# Patient Record
Sex: FEMALE | Race: ASIAN | NOT HISPANIC OR LATINO | Employment: OTHER | URBAN - METROPOLITAN AREA
[De-identification: names, ages, dates, MRNs, and addresses within clinical notes are randomized per-mention and may not be internally consistent; named-entity substitution may affect disease eponyms.]

---

## 2018-01-05 ENCOUNTER — OFFICE VISIT (OUTPATIENT)
Dept: URGENT CARE | Facility: PHYSICIAN GROUP | Age: 63
End: 2018-01-05
Payer: COMMERCIAL

## 2018-01-05 VITALS
TEMPERATURE: 98.1 F | OXYGEN SATURATION: 95 % | DIASTOLIC BLOOD PRESSURE: 62 MMHG | SYSTOLIC BLOOD PRESSURE: 104 MMHG | BODY MASS INDEX: 34.91 KG/M2 | WEIGHT: 197 LBS | HEART RATE: 94 BPM | HEIGHT: 63 IN

## 2018-01-05 DIAGNOSIS — J22 LRTI (LOWER RESPIRATORY TRACT INFECTION): ICD-10-CM

## 2018-01-05 PROCEDURE — 99203 OFFICE O/P NEW LOW 30 MIN: CPT | Performed by: FAMILY MEDICINE

## 2018-01-05 RX ORDER — AZITHROMYCIN 250 MG/1
TABLET, FILM COATED ORAL
Qty: 6 TAB | Refills: 0 | Status: ON HOLD | OUTPATIENT
Start: 2018-01-05 | End: 2019-07-05

## 2018-01-05 RX ORDER — BENZONATATE 100 MG/1
100 CAPSULE ORAL 3 TIMES DAILY PRN
Qty: 15 CAP | Refills: 0 | Status: ON HOLD | OUTPATIENT
Start: 2018-01-05 | End: 2019-07-05

## 2018-01-05 ASSESSMENT — PAIN SCALES - GENERAL: PAINLEVEL: NO PAIN

## 2018-01-06 NOTE — PATIENT INSTRUCTIONS
Cough, Adult   A cough is a reflex that helps clear your throat and airways. It can help heal the body or may be a reaction to an irritated airway. A cough may only last 2 or 3 weeks (acute) or may last more than 8 weeks (chronic).   CAUSES  Acute cough:  · Viral or bacterial infections.  Chronic cough:  · Infections.  · Allergies.  · Asthma.  · Post-nasal drip.  · Smoking.  · Heartburn or acid reflux.  · Some medicines.  · Chronic lung problems (COPD).  · Cancer.  SYMPTOMS   · Cough.  · Fever.  · Chest pain.  · Increased breathing rate.  · High-pitched whistling sound when breathing (wheezing).  · Colored mucus that you cough up (sputum).  TREATMENT   · A bacterial cough may be treated with antibiotic medicine.  · A viral cough must run its course and will not respond to antibiotics.  · Your caregiver may recommend other treatments if you have a chronic cough.  HOME CARE INSTRUCTIONS   · Only take over-the-counter or prescription medicines for pain, discomfort, or fever as directed by your caregiver. Use cough suppressants only as directed by your caregiver.  · Use a cold steam vaporizer or humidifier in your bedroom or home to help loosen secretions.  · Sleep in a semi-upright position if your cough is worse at night.  · Rest as needed.  · Stop smoking if you smoke.  SEEK IMMEDIATE MEDICAL CARE IF:   · You have pus in your sputum.  · Your cough starts to worsen.  · You cannot control your cough with suppressants and are losing sleep.  · You begin coughing up blood.  · You have difficulty breathing.  · You develop pain which is getting worse or is uncontrolled with medicine.  · You have a fever.  MAKE SURE YOU:   · Understand these instructions.  · Will watch your condition.  · Will get help right away if you are not doing well or get worse.     This information is not intended to replace advice given to you by your health care provider. Make sure you discuss any questions you have with your health care provider.      Document Released: 06/15/2012 Document Revised: 03/11/2013 Document Reviewed: 02/24/2016  [employer] Interactive Patient Education ©2016 [employer]

## 2018-01-08 ASSESSMENT — ENCOUNTER SYMPTOMS
CHILLS: 0
SHORTNESS OF BREATH: 1
FEVER: 0
COUGH: 1
RHINORRHEA: 0
WHEEZING: 0

## 2018-01-08 NOTE — PROGRESS NOTES
"Subjective:   Zena Hui is a 62 y.o. female who presents for Cough (ChronicCough , chest congestion and pain x5 days)        Cough   This is a new problem. The current episode started in the past 7 days. The problem has been gradually worsening. The problem occurs every few minutes. The cough is non-productive. Associated symptoms include shortness of breath. Pertinent negatives include no chills, fever, nasal congestion, postnasal drip, rhinorrhea or wheezing.     Review of Systems   Constitutional: Negative for chills and fever.   HENT: Negative for postnasal drip and rhinorrhea.    Respiratory: Positive for cough and shortness of breath. Negative for wheezing.      Allergies   Allergen Reactions   • Calcium      Constipation      Objective:   /62   Pulse 94   Temp 36.7 °C (98.1 °F)   Ht 1.6 m (5' 3\")   Wt 89.4 kg (197 lb)   LMP 01/01/2009   SpO2 95%   Breastfeeding? No   BMI 34.90 kg/m²   Physical Exam   Constitutional: She is oriented to person, place, and time. She appears well-developed and well-nourished. No distress.   HENT:   Head: Normocephalic and atraumatic.   Eyes: Conjunctivae and EOM are normal. Pupils are equal, round, and reactive to light.   Cardiovascular: Normal rate and regular rhythm.    No murmur heard.  Pulmonary/Chest: Effort normal and breath sounds normal. No respiratory distress. She has no wheezes. She has no rales.   Abdominal: Soft. She exhibits no distension. There is no tenderness.   Neurological: She is alert and oriented to person, place, and time. She has normal reflexes. No sensory deficit.   Skin: Skin is warm and dry.   Psychiatric: She has a normal mood and affect.         Assessment/Plan:   Assessment    1. LRTI (lower respiratory tract infection)  Differential diagnosis, natural history, supportive care, and indications for immediate follow-up discussed.   - azithromycin (ZITHROMAX) 250 MG Tab; Take 2 tablets by mouth on day one. Take one tablet by " mouth the remaining days until gone  Dispense: 6 Tab; Refill: 0  - benzonatate (TESSALON) 100 MG Cap; Take 1 Cap by mouth 3 times a day as needed for Cough.  Dispense: 15 Cap; Refill: 0

## 2018-06-27 ENCOUNTER — APPOINTMENT (OUTPATIENT)
Dept: RADIOLOGY | Facility: IMAGING CENTER | Age: 63
End: 2018-06-27
Attending: NURSE PRACTITIONER
Payer: COMMERCIAL

## 2018-06-27 ENCOUNTER — OFFICE VISIT (OUTPATIENT)
Dept: URGENT CARE | Facility: CLINIC | Age: 63
End: 2018-06-27
Payer: COMMERCIAL

## 2018-06-27 VITALS
WEIGHT: 197 LBS | DIASTOLIC BLOOD PRESSURE: 78 MMHG | SYSTOLIC BLOOD PRESSURE: 140 MMHG | TEMPERATURE: 98.4 F | RESPIRATION RATE: 16 BRPM | BODY MASS INDEX: 36.25 KG/M2 | HEART RATE: 93 BPM | OXYGEN SATURATION: 94 % | HEIGHT: 62 IN

## 2018-06-27 DIAGNOSIS — J40 BRONCHITIS: ICD-10-CM

## 2018-06-27 DIAGNOSIS — J02.9 SORE THROAT: ICD-10-CM

## 2018-06-27 DIAGNOSIS — R05.9 COUGH: ICD-10-CM

## 2018-06-27 LAB
INT CON NEG: NEGATIVE
INT CON POS: POSITIVE
S PYO AG THROAT QL: NEGATIVE

## 2018-06-27 PROCEDURE — 71046 X-RAY EXAM CHEST 2 VIEWS: CPT | Mod: TC,FY | Performed by: NURSE PRACTITIONER

## 2018-06-27 PROCEDURE — 99214 OFFICE O/P EST MOD 30 MIN: CPT | Performed by: NURSE PRACTITIONER

## 2018-06-27 PROCEDURE — 87880 STREP A ASSAY W/OPTIC: CPT | Performed by: NURSE PRACTITIONER

## 2018-06-27 RX ORDER — ALBUTEROL SULFATE 90 UG/1
2 AEROSOL, METERED RESPIRATORY (INHALATION) EVERY 6 HOURS PRN
Qty: 8.5 G | Refills: 0 | Status: ON HOLD | OUTPATIENT
Start: 2018-06-27 | End: 2019-07-05

## 2018-06-27 RX ORDER — PREDNISONE 10 MG/1
TABLET ORAL
Qty: 115 TAB | Refills: 0 | Status: ON HOLD | OUTPATIENT
Start: 2018-06-27 | End: 2019-07-05

## 2018-06-27 RX ORDER — DOXYCYCLINE HYCLATE 100 MG
100 TABLET ORAL 2 TIMES DAILY
Qty: 20 TAB | Refills: 0 | Status: CANCELLED | OUTPATIENT
Start: 2018-06-27 | End: 2018-07-07

## 2018-06-27 RX ORDER — BENZONATATE 200 MG/1
200 CAPSULE ORAL 3 TIMES DAILY PRN
Qty: 60 CAP | Refills: 0 | Status: ON HOLD | OUTPATIENT
Start: 2018-06-27 | End: 2019-07-05

## 2018-06-27 RX ORDER — AZITHROMYCIN 250 MG/1
TABLET, FILM COATED ORAL
Qty: 6 TAB | Refills: 0 | Status: ON HOLD | OUTPATIENT
Start: 2018-06-27 | End: 2019-07-05

## 2018-06-27 ASSESSMENT — ENCOUNTER SYMPTOMS
CHILLS: 0
SORE THROAT: 1
FEVER: 0

## 2018-06-27 NOTE — PROGRESS NOTES
Subjective:      Zena Hui is a 63 y.o. female who presents with Sore Throat (4wks throat burns, feels like something is blocking swallowing, fatigue)    Past Medical History:   Diagnosis Date   • Essential hypertension, benign    • Hyperlipidemia    • Mixed hyperlipidemia    • Organic sleep apnea, unspecified    • Osteopenia    • Type II or unspecified type diabetes mellitus without mention of complication, not stated as uncontrolled      Social History     Social History   • Marital status:      Spouse name: N/A   • Number of children: N/A   • Years of education: N/A     Occupational History   • Not on file.     Social History Main Topics   • Smoking status: Never Smoker   • Smokeless tobacco: Never Used   • Alcohol use Not on file   • Drug use: No   • Sexual activity: Yes     Partners: Male     Other Topics Concern   • Not on file     Social History Narrative   • No narrative on file     Family History   Problem Relation Age of Onset   • Arthritis Mother    • Lung Disease Mother      long term smoker   • Heart Disease Father    • Heart Disease Brother      CAD   S/P CABG       Allergies: Calcium    Patient is a 63-year-old female who presents with complaint of ongoing productive cough. Original symptoms started 4 weeks ago. States that she is also having fatigue and malaise with an increased sore throat. States her sore throat waxes and wanes and is usually more tender at night. Previously took a z-pack and states this helped but states her sypmtoms have been persistent.           Other   This is a new problem. The current episode started 1 to 4 weeks ago. The problem occurs constantly. The problem has been unchanged. Associated symptoms include a sore throat. Pertinent negatives include no chills or fever. Nothing aggravates the symptoms. She has tried nothing for the symptoms. The treatment provided no relief.       Review of Systems   Constitutional: Positive for malaise/fatigue. Negative for  "chills and fever.   HENT: Positive for sore throat.    Skin: Negative.    All other systems reviewed and are negative.         Objective:     /78   Pulse 93   Temp 36.9 °C (98.4 °F)   Resp 16   Ht 1.575 m (5' 2\")   Wt 89.4 kg (197 lb)   LMP 01/01/2009   SpO2 94%   Breastfeeding? No   BMI 36.03 kg/m²      Physical Exam   Constitutional: She is oriented to person, place, and time. She appears well-developed and well-nourished.   HENT:   Head: Normocephalic.   Right Ear: External ear normal.   Left Ear: External ear normal.   Nose: Nose normal.   Mouth/Throat: Oropharynx is clear and moist. No oropharyngeal exudate.   Eyes: Conjunctivae and EOM are normal. Pupils are equal, round, and reactive to light.   Neck: Normal range of motion. Neck supple.   Cardiovascular: Normal rate and regular rhythm.    Pulmonary/Chest: Effort normal and breath sounds normal.   Musculoskeletal: Normal range of motion.   Neurological: She is alert and oriented to person, place, and time.   Skin: Skin is warm and dry. Capillary refill takes less than 2 seconds.   Psychiatric: She has a normal mood and affect. Her behavior is normal. Judgment and thought content normal.   Vitals reviewed.    poct strep: negative     XR chest: negative           Assessment/Plan:     1. Sore throat  2. Bronchitis  3. Cough  -prednisone  -zithromax  -albuterol  -tessalon PRN cough  -ER precautions for respiratory distress       "

## 2018-08-17 ENCOUNTER — OFFICE VISIT (OUTPATIENT)
Dept: URGENT CARE | Facility: PHYSICIAN GROUP | Age: 63
End: 2018-08-17
Payer: COMMERCIAL

## 2018-08-17 ENCOUNTER — HOSPITAL ENCOUNTER (OUTPATIENT)
Dept: RADIOLOGY | Facility: MEDICAL CENTER | Age: 63
End: 2018-08-17
Attending: FAMILY MEDICINE
Payer: COMMERCIAL

## 2018-08-17 VITALS
SYSTOLIC BLOOD PRESSURE: 140 MMHG | TEMPERATURE: 97.1 F | BODY MASS INDEX: 35.44 KG/M2 | HEART RATE: 80 BPM | RESPIRATION RATE: 14 BRPM | OXYGEN SATURATION: 95 % | WEIGHT: 200 LBS | HEIGHT: 63 IN | DIASTOLIC BLOOD PRESSURE: 80 MMHG
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
## 2018-08-17 PROCEDURE — 99214 OFFICE O/P EST MOD 30 MIN: CPT | Performed by: FAMILY MEDICINE

## 2018-08-17 PROCEDURE — 73080 X-RAY EXAM OF ELBOW: CPT | Mod: RT

## 2018-08-17 PROCEDURE — 73110 X-RAY EXAM OF WRIST: CPT | Mod: RT

## 2018-08-17 PROCEDURE — 73562 X-RAY EXAM OF KNEE 3: CPT | Mod: LT

## 2018-08-18 NOTE — PROGRESS NOTES
"  Chief Complaint   Patient presents with   • Arm Injury     arm and elbow pain from a fall        HPI    C/o dull, achy intermittent rt elbow pain x 1 d  After a ground level fall.    Also sustained some abrasions to rt knee and      Denies f/c.   Pain better with motrin      Past Medical History:   Diagnosis Date   • Essential hypertension, benign    • Hyperlipidemia    • Mixed hyperlipidemia    • Organic sleep apnea, unspecified    • Osteopenia    • Type II or unspecified type diabetes mellitus without mention of complication, not stated as uncontrolled        Social History   Substance Use Topics   • Smoking status: Never Smoker   • Smokeless tobacco: Never Used   • Alcohol use Not on file       Family History   Problem Relation Age of Onset   • Arthritis Mother    • Lung Disease Mother         long term smoker   • Heart Disease Father    • Heart Disease Brother         CAD   S/P CABG           Review of Systems   Constitutional: Negative for fever, chills and weight loss.   HENT - denies cough, ear pain, congestion, sore throat  Eyes: denies vision changes, discharge  Respiratory: Negative for cough and wheezing.    Cardiovascular: Negative for chest pain or PND.   Gastrointestinal:  No abdominal pain,  nausea, vomiting, diarrhea.  Negative for  blood in stool.    - no discharge, dysuria, frequency.      Neurological: Negative for dizziness and headaches.   musculoskeletal - denies myalgias, calf pain  Psych - denies anxiety/depression/mood changes.  Skin: no itching or rash  All other systems reviewed and are negative.       Objective:     Blood pressure 140/80, pulse 80, temperature 36.2 °C (97.1 °F), resp. rate 14, height 1.6 m (5' 3\"), weight 90.7 kg (200 lb), last menstrual period 01/01/2009, SpO2 95 %.      Physical Exam   Constitutional: pt is oriented to person, place, and time. Pt appears well-developed. No distress.   HENT:   Head: Normocephalic and atraumatic.   Eyes: Conjunctivae are normal. "   Cardiovascular: Normal rate.    Pulmonary/Chest: Effort normal.   Musculoskeletal:        Rt elbow: full AROM.  There is no joint effusion.   Pt exhibits normal range of motion.     +TTP over olecranon process  Neurological: pt is alert and oriented to person, place, and time.   Skin: Skin is warm. Pt is not diaphoretic. No erythema.   Psychiatric: pt's behavior is normal.   Nursing note and vitals reviewed.       Narrative       8/17/2018 6:03 PM    HISTORY/REASON FOR EXAM:  Pain/Deformity Following Trauma.  Fall, pain.    TECHNIQUE/EXAM DESCRIPTION AND NUMBER OF VIEWS:  3 views of the RIGHT elbow.    COMPARISON: None    FINDINGS:  All 3 images are oblique in orientation, limiting their efficacy for detection of fracture and effusion    No gross effusion is seen    No acute fracture or subluxation is seen.    There is moderate bony spurring especially along the common extensor origin   Impression       No radiographic evidence of acute traumatic injury.    Limited by technique. If symptoms persist, follow-up radiographs are advised when the patient is better able to tolerate positioning   Reading Provider Reading Date   Espinoza Borges M.D. Aug 17, 2018            8/17/2018 6:03 PM    HISTORY/REASON FOR EXAM:  Pain/Deformity Following Trauma.  Fall, pain.    TECHNIQUE/EXAM DESCRIPTION AND NUMBER OF VIEWS:  3 views of the RIGHT elbow.    COMPARISON: None    FINDINGS:  All 3 images are oblique in orientation, limiting their efficacy for detection of fracture and effusion    No gross effusion is seen    No acute fracture or subluxation is seen.    There is moderate bony spurring especially along the common extensor origin   Impression       No radiographic evidence of acute traumatic injury.    Limited by technique. If symptoms persist, follow-up radiographs are advised when the patient is better able to tolerate positioning   Reading Provider Reading Date   Espinoza Borges M.D. Aug 17, 2018   Signing Provider  Signing Date Signing Time   Espinoza Borges M.D. Aug 17, 2018  6:43 PM       Assessment/Plan:         Elbow contusion    X-rays were personally reviewed by myself.   There is no fracture.     rx motrin 800mg tid, prn  Arm placed in sling  Follow up in one week if no improvement, sooner if symptoms worsen.       - DX-KNEE 3 VIEWS LEFT; Future  - DX-ELBOW-COMPLETE 3+ RIGHT; Future  - DX-WRIST-COMPLETE 3+ RIGHT; Future

## 2019-07-05 ENCOUNTER — ANESTHESIA EVENT (OUTPATIENT)
Dept: SURGERY | Facility: MEDICAL CENTER | Age: 64
End: 2019-07-05
Payer: COMMERCIAL

## 2019-07-05 ENCOUNTER — ANESTHESIA (OUTPATIENT)
Dept: SURGERY | Facility: MEDICAL CENTER | Age: 64
End: 2019-07-05
Payer: COMMERCIAL

## 2019-07-05 ENCOUNTER — HOSPITAL ENCOUNTER (OUTPATIENT)
Facility: MEDICAL CENTER | Age: 64
End: 2019-07-05
Attending: UROLOGY | Admitting: UROLOGY
Payer: COMMERCIAL

## 2019-07-05 ENCOUNTER — APPOINTMENT (OUTPATIENT)
Dept: RADIOLOGY | Facility: MEDICAL CENTER | Age: 64
End: 2019-07-05
Attending: UROLOGY
Payer: COMMERCIAL

## 2019-07-05 VITALS
BODY MASS INDEX: 36.68 KG/M2 | HEIGHT: 63 IN | TEMPERATURE: 98.6 F | HEART RATE: 66 BPM | OXYGEN SATURATION: 94 % | RESPIRATION RATE: 16 BRPM | WEIGHT: 207.01 LBS

## 2019-07-05 PROBLEM — E66.9 OBESITY (BMI 30-39.9): Status: ACTIVE | Noted: 2019-07-05

## 2019-07-05 LAB
ANION GAP SERPL CALC-SCNC: 9 MMOL/L (ref 0–11.9)
APPEARANCE UR: CLEAR
BACTERIA #/AREA URNS HPF: NEGATIVE /HPF
BILIRUB UR QL STRIP.AUTO: NEGATIVE
BUN SERPL-MCNC: 15 MG/DL (ref 8–22)
CALCIUM SERPL-MCNC: 9.3 MG/DL (ref 8.5–10.5)
CHLORIDE SERPL-SCNC: 108 MMOL/L (ref 96–112)
CO2 SERPL-SCNC: 22 MMOL/L (ref 20–33)
COLOR UR: YELLOW
CREAT SERPL-MCNC: 0.9 MG/DL (ref 0.5–1.4)
EKG IMPRESSION: NORMAL
EPI CELLS #/AREA URNS HPF: NEGATIVE /HPF
ERYTHROCYTE [DISTWIDTH] IN BLOOD BY AUTOMATED COUNT: 43.3 FL (ref 35.9–50)
GLUCOSE BLD-MCNC: 146 MG/DL (ref 65–99)
GLUCOSE BLD-MCNC: 152 MG/DL (ref 65–99)
GLUCOSE SERPL-MCNC: 156 MG/DL (ref 65–99)
GLUCOSE UR STRIP.AUTO-MCNC: NEGATIVE MG/DL
HCT VFR BLD AUTO: 42.3 % (ref 37–47)
HGB BLD-MCNC: 13.3 G/DL (ref 12–16)
HYALINE CASTS #/AREA URNS LPF: ABNORMAL /LPF
KETONES UR STRIP.AUTO-MCNC: NEGATIVE MG/DL
LEUKOCYTE ESTERASE UR QL STRIP.AUTO: ABNORMAL
MCH RBC QN AUTO: 27.6 PG (ref 27–33)
MCHC RBC AUTO-ENTMCNC: 31.4 G/DL (ref 33.6–35)
MCV RBC AUTO: 87.8 FL (ref 81.4–97.8)
MICRO URNS: ABNORMAL
NITRITE UR QL STRIP.AUTO: NEGATIVE
PH UR STRIP.AUTO: 5 [PH]
PLATELET # BLD AUTO: 270 K/UL (ref 164–446)
PMV BLD AUTO: 9.2 FL (ref 9–12.9)
POTASSIUM SERPL-SCNC: 3.9 MMOL/L (ref 3.6–5.5)
PROT UR QL STRIP: NEGATIVE MG/DL
RBC # BLD AUTO: 4.82 M/UL (ref 4.2–5.4)
RBC # URNS HPF: ABNORMAL /HPF
RBC UR QL AUTO: ABNORMAL
SODIUM SERPL-SCNC: 139 MMOL/L (ref 135–145)
SP GR UR STRIP.AUTO: 1.01
UROBILINOGEN UR STRIP.AUTO-MCNC: 0.2 MG/DL
WBC # BLD AUTO: 7.3 K/UL (ref 4.8–10.8)
WBC #/AREA URNS HPF: ABNORMAL /HPF

## 2019-07-05 PROCEDURE — 160041 HCHG SURGERY MINUTES - EA ADDL 1 MIN LEVEL 4: Performed by: UROLOGY

## 2019-07-05 PROCEDURE — 160048 HCHG OR STATISTICAL LEVEL 1-5: Performed by: UROLOGY

## 2019-07-05 PROCEDURE — 160009 HCHG ANES TIME/MIN: Performed by: UROLOGY

## 2019-07-05 PROCEDURE — 160002 HCHG RECOVERY MINUTES (STAT): Performed by: UROLOGY

## 2019-07-05 PROCEDURE — 700117 HCHG RX CONTRAST REV CODE 255: Performed by: UROLOGY

## 2019-07-05 PROCEDURE — A9270 NON-COVERED ITEM OR SERVICE: HCPCS | Performed by: ANESTHESIOLOGY

## 2019-07-05 PROCEDURE — 501329 HCHG SET, CYSTO IRRIG Y TUR: Performed by: UROLOGY

## 2019-07-05 PROCEDURE — 500880 HCHG PACK, CYSTO W/SEP LEGGINGS: Performed by: UROLOGY

## 2019-07-05 PROCEDURE — 88300 SURGICAL PATH GROSS: CPT

## 2019-07-05 PROCEDURE — 93005 ELECTROCARDIOGRAM TRACING: CPT | Performed by: UROLOGY

## 2019-07-05 PROCEDURE — 700102 HCHG RX REV CODE 250 W/ 637 OVERRIDE(OP): Performed by: ANESTHESIOLOGY

## 2019-07-05 PROCEDURE — 82962 GLUCOSE BLOOD TEST: CPT

## 2019-07-05 PROCEDURE — C1758 CATHETER, URETERAL: HCPCS | Performed by: UROLOGY

## 2019-07-05 PROCEDURE — C1769 GUIDE WIRE: HCPCS | Performed by: UROLOGY

## 2019-07-05 PROCEDURE — 82365 CALCULUS SPECTROSCOPY: CPT

## 2019-07-05 PROCEDURE — C2617 STENT, NON-COR, TEM W/O DEL: HCPCS | Performed by: UROLOGY

## 2019-07-05 PROCEDURE — 700105 HCHG RX REV CODE 258: Performed by: UROLOGY

## 2019-07-05 PROCEDURE — 87086 URINE CULTURE/COLONY COUNT: CPT

## 2019-07-05 PROCEDURE — 700105 HCHG RX REV CODE 258: Performed by: ANESTHESIOLOGY

## 2019-07-05 PROCEDURE — 160046 HCHG PACU - 1ST 60 MINS PHASE II: Performed by: UROLOGY

## 2019-07-05 PROCEDURE — 85027 COMPLETE CBC AUTOMATED: CPT

## 2019-07-05 PROCEDURE — 700111 HCHG RX REV CODE 636 W/ 250 OVERRIDE (IP)

## 2019-07-05 PROCEDURE — 160029 HCHG SURGERY MINUTES - 1ST 30 MINS LEVEL 4: Performed by: UROLOGY

## 2019-07-05 PROCEDURE — 81001 URINALYSIS AUTO W/SCOPE: CPT

## 2019-07-05 PROCEDURE — 80048 BASIC METABOLIC PNL TOTAL CA: CPT

## 2019-07-05 PROCEDURE — 160025 RECOVERY II MINUTES (STATS): Performed by: UROLOGY

## 2019-07-05 PROCEDURE — 93010 ELECTROCARDIOGRAM REPORT: CPT | Performed by: INTERNAL MEDICINE

## 2019-07-05 PROCEDURE — 700111 HCHG RX REV CODE 636 W/ 250 OVERRIDE (IP): Performed by: ANESTHESIOLOGY

## 2019-07-05 PROCEDURE — 160035 HCHG PACU - 1ST 60 MINS PHASE I: Performed by: UROLOGY

## 2019-07-05 PROCEDURE — 700101 HCHG RX REV CODE 250: Performed by: ANESTHESIOLOGY

## 2019-07-05 DEVICE — STENT UROLOGICAL POLARIS 6X22  ULTRA: Type: IMPLANTABLE DEVICE | Status: FUNCTIONAL

## 2019-07-05 RX ORDER — LIDOCAINE HYDROCHLORIDE 10 MG/ML
INJECTION, SOLUTION EPIDURAL; INFILTRATION; INTRACAUDAL; PERINEURAL
Status: COMPLETED
Start: 2019-07-05 | End: 2019-07-05

## 2019-07-05 RX ORDER — OXYCODONE HCL 5 MG/5 ML
5 SOLUTION, ORAL ORAL
Status: DISCONTINUED | OUTPATIENT
Start: 2019-07-05 | End: 2019-07-05 | Stop reason: HOSPADM

## 2019-07-05 RX ORDER — GABAPENTIN 300 MG/1
300 CAPSULE ORAL ONCE
Status: COMPLETED | OUTPATIENT
Start: 2019-07-05 | End: 2019-07-05

## 2019-07-05 RX ORDER — MEPERIDINE HYDROCHLORIDE 25 MG/ML
12.5 INJECTION INTRAMUSCULAR; INTRAVENOUS; SUBCUTANEOUS
Status: DISCONTINUED | OUTPATIENT
Start: 2019-07-05 | End: 2019-07-05 | Stop reason: HOSPADM

## 2019-07-05 RX ORDER — CIPROFLOXACIN 2 MG/ML
INJECTION, SOLUTION INTRAVENOUS PRN
Status: DISCONTINUED | OUTPATIENT
Start: 2019-07-05 | End: 2019-07-05 | Stop reason: SURG

## 2019-07-05 RX ORDER — HALOPERIDOL 5 MG/ML
1 INJECTION INTRAMUSCULAR
Status: DISCONTINUED | OUTPATIENT
Start: 2019-07-05 | End: 2019-07-05 | Stop reason: HOSPADM

## 2019-07-05 RX ORDER — DEXAMETHASONE SODIUM PHOSPHATE 4 MG/ML
INJECTION, SOLUTION INTRA-ARTICULAR; INTRALESIONAL; INTRAMUSCULAR; INTRAVENOUS; SOFT TISSUE PRN
Status: DISCONTINUED | OUTPATIENT
Start: 2019-07-05 | End: 2019-07-05 | Stop reason: SURG

## 2019-07-05 RX ORDER — SODIUM CHLORIDE, SODIUM GLUCONATE, SODIUM ACETATE, POTASSIUM CHLORIDE AND MAGNESIUM CHLORIDE 526; 502; 368; 37; 30 MG/100ML; MG/100ML; MG/100ML; MG/100ML; MG/100ML
500 INJECTION, SOLUTION INTRAVENOUS CONTINUOUS
Status: DISCONTINUED | OUTPATIENT
Start: 2019-07-05 | End: 2019-07-05 | Stop reason: HOSPADM

## 2019-07-05 RX ORDER — ACETAMINOPHEN 500 MG
1000 TABLET ORAL ONCE
Status: COMPLETED | OUTPATIENT
Start: 2019-07-05 | End: 2019-07-05

## 2019-07-05 RX ORDER — ONDANSETRON 2 MG/ML
INJECTION INTRAMUSCULAR; INTRAVENOUS PRN
Status: DISCONTINUED | OUTPATIENT
Start: 2019-07-05 | End: 2019-07-05 | Stop reason: SURG

## 2019-07-05 RX ORDER — CELECOXIB 200 MG/1
200 CAPSULE ORAL ONCE
Status: COMPLETED | OUTPATIENT
Start: 2019-07-05 | End: 2019-07-05

## 2019-07-05 RX ORDER — SODIUM CHLORIDE, SODIUM LACTATE, POTASSIUM CHLORIDE, CALCIUM CHLORIDE 600; 310; 30; 20 MG/100ML; MG/100ML; MG/100ML; MG/100ML
INJECTION, SOLUTION INTRAVENOUS CONTINUOUS
Status: DISCONTINUED | OUTPATIENT
Start: 2019-07-05 | End: 2019-07-05

## 2019-07-05 RX ORDER — SODIUM CHLORIDE, SODIUM LACTATE, POTASSIUM CHLORIDE, CALCIUM CHLORIDE 600; 310; 30; 20 MG/100ML; MG/100ML; MG/100ML; MG/100ML
INJECTION, SOLUTION INTRAVENOUS
Status: DISCONTINUED | OUTPATIENT
Start: 2019-07-05 | End: 2019-07-05 | Stop reason: SURG

## 2019-07-05 RX ORDER — OXYCODONE HCL 5 MG/5 ML
10 SOLUTION, ORAL ORAL
Status: DISCONTINUED | OUTPATIENT
Start: 2019-07-05 | End: 2019-07-05 | Stop reason: HOSPADM

## 2019-07-05 RX ORDER — ONDANSETRON 2 MG/ML
4 INJECTION INTRAMUSCULAR; INTRAVENOUS
Status: DISCONTINUED | OUTPATIENT
Start: 2019-07-05 | End: 2019-07-05 | Stop reason: HOSPADM

## 2019-07-05 RX ADMIN — SUGAMMADEX 200 MG: 100 INJECTION, SOLUTION INTRAVENOUS at 19:13

## 2019-07-05 RX ADMIN — DEXAMETHASONE SODIUM PHOSPHATE 8 MG: 4 INJECTION, SOLUTION INTRA-ARTICULAR; INTRALESIONAL; INTRAMUSCULAR; INTRAVENOUS; SOFT TISSUE at 18:02

## 2019-07-05 RX ADMIN — FENTANYL CITRATE 50 MCG: 50 INJECTION, SOLUTION INTRAMUSCULAR; INTRAVENOUS at 17:57

## 2019-07-05 RX ADMIN — FENTANYL CITRATE 25 MCG: 50 INJECTION, SOLUTION INTRAMUSCULAR; INTRAVENOUS at 18:13

## 2019-07-05 RX ADMIN — FENTANYL CITRATE 25 MCG: 50 INJECTION, SOLUTION INTRAMUSCULAR; INTRAVENOUS at 18:29

## 2019-07-05 RX ADMIN — LIDOCAINE HYDROCHLORIDE 100 MG: 20 INJECTION, SOLUTION INTRAVENOUS at 17:57

## 2019-07-05 RX ADMIN — ONDANSETRON 4 MG: 2 INJECTION INTRAMUSCULAR; INTRAVENOUS at 19:11

## 2019-07-05 RX ADMIN — MIDAZOLAM HYDROCHLORIDE 2 MG: 1 INJECTION, SOLUTION INTRAMUSCULAR; INTRAVENOUS at 17:52

## 2019-07-05 RX ADMIN — CIPROFLOXACIN 400 MG: 2 INJECTION, SOLUTION INTRAVENOUS at 18:01

## 2019-07-05 RX ADMIN — SODIUM CHLORIDE, POTASSIUM CHLORIDE, SODIUM LACTATE AND CALCIUM CHLORIDE: 600; 310; 30; 20 INJECTION, SOLUTION INTRAVENOUS at 12:29

## 2019-07-05 RX ADMIN — PROPOFOL 150 MG: 10 INJECTION, EMULSION INTRAVENOUS at 17:57

## 2019-07-05 RX ADMIN — GABAPENTIN 300 MG: 300 CAPSULE ORAL at 13:13

## 2019-07-05 RX ADMIN — CELECOXIB 200 MG: 200 CAPSULE ORAL at 13:13

## 2019-07-05 RX ADMIN — ACETAMINOPHEN 1000 MG: 500 TABLET ORAL at 13:13

## 2019-07-05 RX ADMIN — LIDOCAINE HYDROCHLORIDE 0.5 ML: 10 INJECTION, SOLUTION EPIDURAL; INFILTRATION; INTRACAUDAL at 12:06

## 2019-07-05 RX ADMIN — FENTANYL CITRATE 50 MCG: 50 INJECTION, SOLUTION INTRAMUSCULAR; INTRAVENOUS at 19:20

## 2019-07-05 RX ADMIN — FENTANYL CITRATE 50 MCG: 50 INJECTION, SOLUTION INTRAMUSCULAR; INTRAVENOUS at 19:15

## 2019-07-05 RX ADMIN — ROCURONIUM BROMIDE 10 MG: 10 INJECTION, SOLUTION INTRAVENOUS at 18:53

## 2019-07-05 RX ADMIN — FENTANYL CITRATE 50 MCG: 50 INJECTION, SOLUTION INTRAMUSCULAR; INTRAVENOUS at 18:53

## 2019-07-05 RX ADMIN — SODIUM CHLORIDE, POTASSIUM CHLORIDE, SODIUM LACTATE AND CALCIUM CHLORIDE: 600; 310; 30; 20 INJECTION, SOLUTION INTRAVENOUS at 17:52

## 2019-07-05 RX ADMIN — Medication 0.5 ML: at 12:06

## 2019-07-05 RX ADMIN — ROCURONIUM BROMIDE 30 MG: 10 INJECTION, SOLUTION INTRAVENOUS at 18:29

## 2019-07-05 ASSESSMENT — PAIN SCALES - GENERAL: PAIN_LEVEL: 0

## 2019-07-06 LAB — PATHOLOGY CONSULT NOTE: NORMAL

## 2019-07-06 NOTE — DISCHARGE INSTRUCTIONS
ACTIVITY: Rest and take it easy for the first 24 hours.  A responsible adult is recommended to remain with you during that time.  It is normal to feel sleepy.  We encourage you to not do anything that requires balance, judgment or coordination.    MILD FLU-LIKE SYMPTOMS ARE NORMAL. YOU MAY EXPERIENCE GENERALIZED MUSCLE ACHES, THROAT IRRITATION, HEADACHE AND/OR SOME NAUSEA.    FOR 24 HOURS DO NOT:  Drive, operate machinery or run household appliances.  Drink beer or alcoholic beverages.   Make important decisions or sign legal documents.    SPECIAL INSTRUCTIONS: DC home.  Follow-up with [facility] in 2 weeks.  Call for fevers chills sweats nausea vomiting diarrhea shortness of breath lower extremity inability to void persistent bleeding or pain is not controlled with pain meds.  Resume previous ADA diet.  Light activity x48 hours.  No driving x24 hours.  Call for any issues whatsoever.  Stent will need to be removed in the office with cystoscopy as previously described.  Med rec done.  Meds on chart.    DIET: To avoid nausea, slowly advance diet as tolerated, avoiding spicy or greasy foods for the first day.  Add more substantial food to your diet according to your physician's instructions.  Babies can be fed formula or breast milk as soon as they are hungry.  INCREASE FLUIDS AND FIBER TO AVOID CONSTIPATION.    SURGICAL DRESSING/BATHING: Follow Dr. Orr discharge instructions    FOLLOW-UP APPOINTMENT:  A follow-up appointment should be arranged with your doctor in 1-2 weeks; call to schedule.    You should CALL YOUR PHYSICIAN if you develop:  Fever greater than 101 degrees F.  Pain not relieved by medication, or persistent nausea or vomiting.  Excessive bleeding (blood soaking through dressing) or unexpected drainage from the wound.  Extreme redness or swelling around the incision site, drainage of pus or foul smelling drainage.  Inability to urinate or empty your bladder within 8 hours.  Problems with breathing or  chest pain.    You should call 911 if you develop problems with breathing or chest pain.  If you are unable to contact your doctor or surgical center, you should go to the nearest emergency room or urgent care center.  Physician's telephone #: [phone]    If any questions arise, call your doctor.  If your doctor is not available, please feel free to call the Surgical Center at [phone].  The Center is open Monday through Friday from 7AM to 7PM.  You can also call the HEALTH HOTLINE open 24 hours/day, 7 days/week and speak to a nurse at [phone], or toll free at [phone].    A registered nurse may call you a few days after your surgery to see how you are doing after your procedure.    MEDICATIONS: Resume taking daily medication.  Take prescribed pain medication with food.  If no medication is prescribed, you may take non-aspirin pain medication if needed.  PAIN MEDICATION CAN BE VERY CONSTIPATING.  Take a stool softener or laxative such as senokot, pericolace, or milk of magnesia if needed.    Prescription given for Flomax, Antibiotic and Tramadol.  No pain medication given.    If your physician has prescribed pain medication that includes Acetaminophen (Tylenol), do not take additional Acetaminophen (Tylenol) while taking the prescribed medication.    Depression / Suicide Risk    As you are discharged from this [facility] facility, it is important to learn how to keep safe from harming yourself.    Recognize the warning signs:  · Abrupt changes in personality, positive or negative- including increase in energy   · Giving away possessions  · Change in eating patterns- significant weight changes-  positive or negative  · Change in sleeping patterns- unable to sleep or sleeping all the time   · Unwillingness or inability to communicate  · Depression  · Unusual sadness, discouragement and loneliness  · Talk of wanting to die  · Neglect of personal appearance   · Rebelliousness- reckless  behavior  · Withdrawal from people/activities they love  · Confusion- inability to concentrate     If you or a loved one observes any of these behaviors or has concerns about self-harm, here's what you can do:  · Talk about it- your feelings and reasons for harming yourself  · Remove any means that you might use to hurt yourself (examples: pills, rope, extension cords, firearm)  · Get professional help from the community (Mental Health, Substance Abuse, psychological counseling)  · Do not be alone:Call your Safe Contact- someone whom you trust who will be there for you.  · Call your local CRISIS HOTLINE 415-6098 or 592-754-2771  · Call your local Children's Mobile Crisis Response Team [place] (102) 269-1564 or www.[employer]  · Call the toll free National Suicide Prevention Hotlines   · National Suicide Prevention Lifeline 772-321-OMZM (7061)  · National Hope Line Network 800-SUICIDE (904-5197)

## 2019-07-06 NOTE — OR SURGEON
Immediate Post OP Note    PreOp Diagnosis: Large right renal stone with other small stones on the right.    PostOp Diagnosis: Large right renal stone with medium sized stone in the right proximal ureter as well as other small scattered stones on the right side, mild renal ptosis    Procedure(s):  CYSTOSCOPY   ureteroscopy and laser lithotripsy of renal stones first site- Wound Class: Clean Contaminated  URETEROSCOPY and laser lithotripsy of ureteral stone second site- Wound Class: Clean Contaminated  Right retrograde pyelogram  Ureteral dilation- Wound Class: Clean Contaminated  STENT PLACEMENT double-J 6 x 22- Wound Class: Clean Contaminated    Surgeon(s):  Espinoza Orr M.D.    Anesthesiologist/Type of Anesthesia:  Anesthesiologist: Nancy Kerr M.D./General    Surgical Staff:  Circulator: Matilde Marinelli R.N.  Relief Circulator: Collin Dash R.N.  Relief Scrub: Gil Love  Scrub Person: Stefany LoveRuthieSpicer    Specimens removed if any:  ID Type Source Tests Collected by Time Destination   1 : RIGHT URETERAL STONE FOR CHEMICAL ANALYSIS Other Other MISCELLANEOUS TEST Espinoza Orr M.D. 7/5/2019  6:38 PM        Estimated Blood Loss: 3 cc    Findings: Mild right renal ptosis 1.6 cm stone in the right upper pole small scattered stones 1.5 mm or less in the mid and lower pole as well as a 8 mm stone in the right ureter.    Complications: None    Stent 6 x 22 double-J no straining    Technique: After the patient was properly identified, the labs reviewed, the H&P updated, the plan discussed with patient the operative team, patient expressed verbal understanding of the procedure and desire to proceed and had no further questions.  The patient was then taken to the operative theater placed in spine position where general anesthesia was introduced, patient was then moved to a dorsal lithotomy position with care being taken to pad all pressure points and avoid any perturbations of joints may  cause injury and this was maintained throughout the procedure.  The sore timeout was done after the patient was draped.  Antibiotic's were given.  All in the room agreed to proceed.  There was all the equipment necessary in the room.  The procedure was begun with a 22 Czech sheath per urethra into the bladder the right ureter was cannulized with a Glidewire.  The 11/13 ureteral access sheath was then sequentially used to dilate the ureter up to the proximal proximal ureter.  We then introduced the ureteroscope into the ureter and found a stone in the proximal ureter.  We then laser the stone into small fragments and vascular them out.  We then were able to go into the kidney and going to the upper pole and laser the 1.6 to 1.7 cm stone into small pieces the other small stones there were any larger than she is were lasered the other ones were so small they were unable to even be basketed moved to the upper pole.  Thus once his comminution stone was adequate, we replaced the wire, and watched the sheath out,   there were no stones in the ureter,  the stent was then placed over the wire - there was no damage to the ureter - the stent was placed in good position above and below the bladder was emptied the procedure terminated-  basketed stones were sent for specimen.        7/5/2019 7:17 PM Espinoza Orr M.D.

## 2019-07-06 NOTE — ANESTHESIA PREPROCEDURE EVALUATION
65 yo w/ right kidney stones    Relevant Problems   (+) Diabetes mellitus   (+) Elevated cholesterol   (+) Hypertension   (+) Sleep apnea     Denies CAD, CP/SOB, CVA, LUNG/LIVER/KIDNEY DZ, GERD, URI    Physical Exam    Airway   Mallampati: II  TM distance: >3 FB  Neck ROM: full       Cardiovascular   Rhythm: irregular  Rate: normal  (-) murmur     Dental - normal exam         Pulmonary   Breath sounds clear to auscultation     Abdominal    Neurological - normal exam                 Anesthesia Plan    ASA 2       Plan - general       Airway plan will be LMA        Induction: intravenous    Postoperative Plan: Postoperative administration of opioids is intended.    Pertinent diagnostic labs and testing reviewed    Informed Consent:    Anesthetic plan and risks discussed with patient.

## 2019-07-06 NOTE — ANESTHESIA QCDR
2019 [facility] Clinical Data Registry (for Quality Improvement)     Postoperative nausea/vomiting risk protocol (Adult = 18 yrs and Pediatric 3-17 yrs)- (430 and 463)  General inhalation anesthetic (NOT TIVA) with PONV risk factors: Yes  Provision of anti-emetic therapy with at least 2 different classes of agents: Yes   Patient DID NOT receive anti-emetic therapy and reason is documented in Medical Record:  N/A    Multimodal Pain Management- (AQI59)  Patient undergoing Elective Surgery (i.e. Outpatient, or ASC, or Prescheduled Surgery prior to Hospital Admission): Yes  Use of Multimodal Pain Management, two or more drugs and/or interventions, NOT including systemic opioids: Yes   Exception: Documented allergy to multiple classes of analgesics:  N/A    PACU assessment of acute postoperative pain prior to Anesthesia Care End- Applies to Patients Age = 18- (ABG7)  Initial PACU pain score is which of the following: < 7/10  Patient unable to report pain score: N/A    Post-anesthetic transfer of care checklist/protocol to PACU/ICU- (426 and 427)  Upon conclusion of case, patient transferred to which of the following locations: PACU/Non-ICU  Use of transfer checklist/protocol: Yes  Exclusion: Service Performed in Patient Hospital Room (and thus did not require transfer): N/A    PACU Reintubation- (AQI31)  General anesthesia requiring endotracheal intubation (ETT) along with subsequent extubation in OR or PACU: No  Required reintubation in the PACU: N/A  Extubation was a planned trial documented in the medical record prior to removal of the original airway device: N/A    Unplanned admission to ICU related to anesthesia service up through end of PACU care- (MD51)  Unplanned admission to ICU (not initially anticipated at anesthesia start time): No

## 2019-07-06 NOTE — PROGRESS NOTES
Pt arrived to floor from PACU. Pt A+Ox4, able to make needs known, PIV Patent and SL. Pain 0/10.   CSMT's and ROYCE's WNL, Pt ambulated to bathroom, voided x1.     Pt's VSS; denies N/V; states pain is 0/10 but tolerable level. D/c orders received. IV dc'd. Pt changed into clothing with assistance. Discharge instructions given; pt and family verbalized understanding and questions answered. Patient states ready to d/c home. Prescriptions given. Pt  Completed phase 2.

## 2019-07-06 NOTE — ANESTHESIA POSTPROCEDURE EVALUATION
Patient: Zena Hui    Procedure Summary     Date:  07/05/19 Room / Location:  [place] [address] / SURGERY [facility]    Anesthesia Start:  1752 Anesthesia Stop:  1929    Procedures:       CYSTOSCOPY (N/A Bladder)      URETEROSCOPY (Right Ureter)      LITHOTRIPSY, USING LASER (Right Ureter)      STENT PLACEMENT (Right Ureter) Diagnosis:  (RIGHT RENAL STONES)    Surgeon:  Espinoza Orr M.D. Responsible Provider:  Nancy Kerr M.D.    Anesthesia Type:  general ASA Status:  2          Final Anesthesia Type: general  Last vitals  BP   NIBP: (!) 167/82    Temp   36.7 °C (98.1 °F)    Pulse   Pulse: 68, Heart Rate (Monitored): 68   Resp   16    SpO2   93 %      Anesthesia Post Evaluation    Patient location during evaluation: PACU  Patient participation: complete - patient participated  Level of consciousness: awake  Pain score: 0    Airway patency: patent  Anesthetic complications: no  Cardiovascular status: stable  Respiratory status: acceptable  Hydration status: acceptable    PONV: none           Nurse Pain Score: 0 (NPRS)

## 2019-07-06 NOTE — ANESTHESIA PROCEDURE NOTES
Airway  Date/Time: 7/5/2019 5:58 PM  Performed by: JUSTYN KRAMER  Authorized by: JUSTYN KRAMER     Location:  OR  Urgency:  Elective  Indications for Airway Management:  Anesthesia  Spontaneous Ventilation: absent    Sedation Level:  Deep  Preoxygenated: Yes    Mask Difficulty Assessment:  0 - not attempted  Final Airway Type:  Supraglottic airway  Final Supraglottic Airway:  Standard LMA  SGA Size:  4  Number of Attempts at Approach:  1

## 2019-07-06 NOTE — ANESTHESIA TIME REPORT
Anesthesia Start and Stop Event Times     Date Time Event    7/5/2019 1752 Anesthesia Start     1929 Anesthesia Stop        Responsible Staff  07/05/19    Name Role Begin End    Nancy Kerr M.D. Anesth 1752 1929        Preop Diagnosis (Free Text):  Pre-op Diagnosis     RIGHT RENAL STONES        Preop Diagnosis (Codes):  Diagnosis Information     Diagnosis Code(s):         Post op Diagnosis  Kidney stones  right side    Premium Reason  A. 3PM - 7AM    Comments:

## 2019-07-06 NOTE — OR NURSING
Post op FSBS 152, Anesthesiologist notified, no new orders.     Pt on room air.  No c/o nausea, tolerating PO fluids.    Pt denies any pain/discomfort at this time. VSS, afebrile, MEJIA, A/O x4.  Sleepy, wakes easily to voice.      No belongings in PACU  Prescriptions on chart.

## 2019-07-07 LAB
BACTERIA UR CULT: ABNORMAL
BACTERIA UR CULT: ABNORMAL
SIGNIFICANT IND 70042: ABNORMAL
SITE SITE: ABNORMAL
SOURCE SOURCE: ABNORMAL

## 2019-07-09 ENCOUNTER — HOSPITAL ENCOUNTER (OUTPATIENT)
Dept: RADIOLOGY | Facility: MEDICAL CENTER | Age: 64
End: 2019-07-09
Attending: UROLOGY
Payer: COMMERCIAL

## 2019-07-09 DIAGNOSIS — I43 NUTRITIONAL AND METABOLIC CARDIOMYOPATHY (HCC): ICD-10-CM

## 2019-07-09 DIAGNOSIS — R31.0 GROSS HEMATURIA: ICD-10-CM

## 2019-07-09 DIAGNOSIS — M54.89 LEFT PARASPINAL BACK PAIN: ICD-10-CM

## 2019-07-09 DIAGNOSIS — N20.0 URIC ACID NEPHROLITHIASIS: ICD-10-CM

## 2019-07-09 DIAGNOSIS — R10.9 STOMACH ACHE: ICD-10-CM

## 2019-07-09 DIAGNOSIS — R35.0 FREQUENCY OF MICTURITION: ICD-10-CM

## 2019-07-09 DIAGNOSIS — E88.9 NUTRITIONAL AND METABOLIC CARDIOMYOPATHY (HCC): ICD-10-CM

## 2019-07-09 DIAGNOSIS — N13.30 HYDRONEPHROSIS, UNSPECIFIED HYDRONEPHROSIS TYPE: ICD-10-CM

## 2019-07-09 DIAGNOSIS — R10.9 ABDOMINAL PAIN, UNSPECIFIED ABDOMINAL LOCATION: ICD-10-CM

## 2019-07-09 DIAGNOSIS — R35.0 URINARY FREQUENCY: ICD-10-CM

## 2019-07-09 DIAGNOSIS — N20.0 CALCULUS, KIDNEY: ICD-10-CM

## 2019-07-09 DIAGNOSIS — M54.89 OTHER DORSALGIA: ICD-10-CM

## 2019-07-09 DIAGNOSIS — E88.9 METABOLIC DISORDER: ICD-10-CM

## 2019-07-09 DIAGNOSIS — E63.9 NUTRITIONAL AND METABOLIC CARDIOMYOPATHY (HCC): ICD-10-CM

## 2019-07-09 LAB
APPEARANCE STONE: NORMAL
COMPN STONE: NORMAL
NUMBER STONE: 2
SIZE STONE: NORMAL MM
SPECIMEN WT: 42 MG

## 2019-07-09 PROCEDURE — 74018 RADEX ABDOMEN 1 VIEW: CPT

## 2019-07-09 PROCEDURE — 76775 US EXAM ABDO BACK WALL LIM: CPT

## 2019-10-03 ENCOUNTER — HOSPITAL ENCOUNTER (OUTPATIENT)
Dept: RADIOLOGY | Facility: MEDICAL CENTER | Age: 64
End: 2019-10-03
Attending: PHYSICIAN ASSISTANT
Payer: COMMERCIAL

## 2019-10-03 DIAGNOSIS — Z87.442 PERSONAL HISTORY OF URINARY CALCULI: ICD-10-CM

## 2019-10-03 PROCEDURE — 74018 RADEX ABDOMEN 1 VIEW: CPT

## 2019-11-26 ENCOUNTER — NON-PROVIDER VISIT (OUTPATIENT)
Dept: HEALTH INFORMATION MANAGEMENT | Facility: MEDICAL CENTER | Age: 64
End: 2019-11-26
Payer: COMMERCIAL

## 2019-11-26 VITALS — BODY MASS INDEX: 37.47 KG/M2 | WEIGHT: 211.5 LBS | HEIGHT: 63 IN

## 2019-11-26 DIAGNOSIS — E66.9 OBESITY, UNSPECIFIED CLASSIFICATION, UNSPECIFIED OBESITY TYPE, UNSPECIFIED WHETHER SERIOUS COMORBIDITY PRESENT: ICD-10-CM

## 2019-11-26 DIAGNOSIS — E11.65 TYPE 2 DIABETES MELLITUS WITH HYPERGLYCEMIA, UNSPECIFIED WHETHER LONG TERM INSULIN USE (HCC): ICD-10-CM

## 2019-11-26 PROCEDURE — 97802 MEDICAL NUTRITION INDIV IN: CPT | Performed by: DIETITIAN, REGISTERED

## 2019-11-26 NOTE — PROGRESS NOTES
"11/26/2019    Carrington Gonzalez D.O.  64 y.o.   Time in/out: 7:31-8:23 AM    Anthropometrics/Objective  Vitals:    11/26/19 0949   Weight: 95.9 kg (211 lb 8 oz)   Height: 1.6 m (5' 3\")       Body mass index is 37.47 kg/m².    Stated Goal Weight: 175 lb  Estimated Caloric needs 1498  Kcal (B[place]ard)  See comprehensive patient history form for further information     Subjective:  - pt here to learn more about \"eating for diabetes\" and wants to lose weight  - diagnosed with diabetes >8 years ago, received nutrition education, has worked with RD  - checks BS 1x daily in AM, BS's range from 129-175   - pt is sleeping ~5-6 hours per night  - snacks on candy and chocolate throughout the day  - currently takes metformin 2x/ day, Trulicity 2x/ week, Glimepiride 2x/ day  - not exercising currently, has a gym at home with treadmill and adriano chi videos    Nutrition Diagnosis (PES Statement)  · Obesity related to excessive energy intake and inadequate energy energy expenditure as evidenced by BMI of 37.47.      Client history:  Condition(s) associated with a diagnosis or treatment (specify) elevated cholesterol, HTN, osteopenia, vitamin D deficiency, DM    Biochemical data, medical test and procedures  Lab Results   Component Value Date/Time    HBA1C 7.5% 12/30/2013 09:25 AM   @  Lab Results   Component Value Date/Time    POCGLUCOSE 152 (H) 07/05/2019 07:29 PM     Lab Results   Component Value Date/Time    CHOLSTRLTOT 161 09/12/2013 07:21 AM    LDL 89 09/12/2013 07:21 AM    HDL 45 09/12/2013 07:21 AM    TRIGLYCERIDE 137 09/12/2013 07:21 AM         Nutrition Intervention    Meal and Snack  Recommend a general/healthful diet    Comprehensive Nutrition education Instruction or training leading to in-depth nutrition related knowledge about:  Benefits to following meal plan, Combine carb, protein and fat at each meal, Menu Planning, Metabolism of carb, protein, fat, Sweets and alcohol in moderation and Handouts provided regarding " "topics discussed    Monitoring & Evaluation Plan    Behavioral-Environmental:  Behavior: Will keep a written food journal    Food / Nutrient Intake:  Food intake: follow the plate method for meal balance and portion control  Beverage intake: avoid sugar sweetened beverages    Physical Signs / Symptoms:  Weight loss towards BMI of <30    Assessment Notes: Reviewed with Zena nutrition basics for a better understanding of carbohydrates, protein and fat. We reviewed the differences between simple sugars and complex carbohydrates. Discussed the plate method and focused on carbohydrate portions at meals. She will focus on having less than a \"fist size\" portion of carbs at meals. Provided her with the snack ideas list. Reviewed having 1 oz of protein with either a non-starchy vegetable or piece of fruit at each snack. Encouraged Zena to reduce her snacking during the day on candy and chocolate. She states the snacking is a habit and psychological so suggested she talk to her PCP about a referral to behavior health. She is going to start tracking in a written food journal. Pt set her goal to track. Next visit suggest to discuss physical activity and label reading.      F/U: 2 weeks with RD  "

## 2019-12-19 ENCOUNTER — NON-PROVIDER VISIT (OUTPATIENT)
Dept: HEALTH INFORMATION MANAGEMENT | Facility: MEDICAL CENTER | Age: 64
End: 2019-12-19
Payer: COMMERCIAL

## 2019-12-19 VITALS — WEIGHT: 210.2 LBS | BODY MASS INDEX: 37.25 KG/M2 | HEIGHT: 63 IN

## 2019-12-19 DIAGNOSIS — E11.65 TYPE 2 DIABETES MELLITUS WITH HYPERGLYCEMIA, UNSPECIFIED WHETHER LONG TERM INSULIN USE (HCC): ICD-10-CM

## 2019-12-19 DIAGNOSIS — E66.9 OBESITY, UNSPECIFIED CLASSIFICATION, UNSPECIFIED OBESITY TYPE, UNSPECIFIED WHETHER SERIOUS COMORBIDITY PRESENT: ICD-10-CM

## 2019-12-19 PROCEDURE — 97803 MED NUTRITION INDIV SUBSEQ: CPT | Performed by: DIETITIAN, REGISTERED

## 2019-12-19 NOTE — PROGRESS NOTES
"Nutrition Reassess    12/19/2019    Carrington Gonzalez D.O.   64 y.o.     Time In/Out: 7:32-7:58 AM      Subjective:  - was sick recently after getting a shingles shot; had diarrhea for ~1 week  - has been keeping a written food journal and finds it helpful; tracks food intake, activity and blood sugars  - has been having less candy and chocolate daily   - checks BS's daily in the morning; they ranged from 109-149 in the past week  - started knitting again for an activity  - getting labs rechecked in late January 2020  - visiting daughter and grandchildren for the holidays    Anthropometrics/Objective    Vitals:    12/19/19 0925   Weight: 95.3 kg (210 lb 3.2 oz)   Height: 1.6 m (5' 3\")     Body mass index is 37.24 kg/m².        Weight Hx:  211.5 lb (11/26/19)  210.2 lb (12/19/19)    Total weight change: -1.3 lb    Diet Recall:  B - oatmeal and blueberries  S - cheese and pears  L - meatloaf with broccoli  S - pear  D - chicken with squash and rice    ReAssesment/Notes:  Zena is now feeling better after being sick. She has been tracking her intake again. Tracking has been helping her to be more mindful of the foods she is eating throughout the day. Briefly reviewed the plate method for meal balance and portion control. She is excited to share what she has learned so far when she visits her daughter over the holidays. She is lacking protein at breakfast time. Reminded Zena to include protein with every meal and have 1 oz of protein with snacks (provided her with the Snack List with Fruit handout). We reviewed the Holiday Tips Handout to help the pt learn ways to handle holiday events while still keeping her health goals in mind. Pt set her goal to continue tracking. Next visit suggested to discuss carbohydrate counting.     Follow-up: 1 month RD      "

## 2020-01-14 ENCOUNTER — APPOINTMENT (OUTPATIENT)
Dept: HEALTH INFORMATION MANAGEMENT | Facility: MEDICAL CENTER | Age: 65
End: 2020-01-14

## 2020-03-11 ENCOUNTER — OFFICE VISIT (OUTPATIENT)
Dept: URGENT CARE | Facility: CLINIC | Age: 65
End: 2020-03-11
Payer: COMMERCIAL

## 2020-03-11 VITALS
WEIGHT: 209 LBS | SYSTOLIC BLOOD PRESSURE: 130 MMHG | TEMPERATURE: 98 F | HEIGHT: 63 IN | DIASTOLIC BLOOD PRESSURE: 70 MMHG | RESPIRATION RATE: 20 BRPM | HEART RATE: 84 BPM | BODY MASS INDEX: 37.03 KG/M2 | OXYGEN SATURATION: 95 %

## 2020-03-11 DIAGNOSIS — R11.2 NAUSEA AND VOMITING, INTRACTABILITY OF VOMITING NOT SPECIFIED, UNSPECIFIED VOMITING TYPE: ICD-10-CM

## 2020-03-11 DIAGNOSIS — J01.90 ACUTE NON-RECURRENT SINUSITIS, UNSPECIFIED LOCATION: ICD-10-CM

## 2020-03-11 DIAGNOSIS — R42 DIZZINESS: ICD-10-CM

## 2020-03-11 PROCEDURE — 99214 OFFICE O/P EST MOD 30 MIN: CPT | Performed by: NURSE PRACTITIONER

## 2020-03-11 RX ORDER — DOXYCYCLINE 100 MG/1
100 CAPSULE ORAL 2 TIMES DAILY
Qty: 10 CAP | Refills: 0 | Status: SHIPPED | OUTPATIENT
Start: 2020-03-11 | End: 2020-03-16

## 2020-03-11 RX ORDER — CLOBETASOL PROPIONATE 0.5 MG/G
1 OINTMENT TOPICAL 2 TIMES DAILY PRN
COMMUNITY
Start: 2020-02-29 | End: 2021-06-28

## 2020-03-11 RX ORDER — FLUCONAZOLE 150 MG/1
TABLET ORAL
COMMUNITY
Start: 2020-01-07 | End: 2020-04-23

## 2020-03-11 RX ORDER — ROSUVASTATIN CALCIUM 20 MG/1
TABLET, COATED ORAL
COMMUNITY
Start: 2020-02-20 | End: 2023-03-02 | Stop reason: SDUPTHER

## 2020-03-11 RX ORDER — MECLIZINE HYDROCHLORIDE 25 MG/1
25 TABLET ORAL 3 TIMES DAILY PRN
Qty: 30 TAB | Refills: 0 | Status: SHIPPED | OUTPATIENT
Start: 2020-03-11 | End: 2021-06-28

## 2020-03-11 ASSESSMENT — ENCOUNTER SYMPTOMS
SHORTNESS OF BREATH: 0
WEAKNESS: 0
SINUS PRESSURE: 1
HEADACHES: 1
BLURRED VISION: 0
SINUS PAIN: 1
TINGLING: 0
VOMITING: 1
NAUSEA: 1
DIZZINESS: 1
SENSORY CHANGE: 0
CARDIOVASCULAR NEGATIVE: 1
CHILLS: 0
RESPIRATORY NEGATIVE: 1
FEVER: 0

## 2020-03-11 ASSESSMENT — PAIN SCALES - GENERAL: PAINLEVEL: 2=MINIMAL-SLIGHT

## 2020-03-11 NOTE — PATIENT INSTRUCTIONS
You may also use any combination of the following over-the-counter medications per 's instructions:  - nasal rinses/saline sprays/neti pot  - nasal steroid sprays (e.g. Flonase, Nasacort)  - oral antihistamine (e.g. Claritin, Zyrtec)  - oral pain reliever (e.g. Tylenol)  - oral anti-inflammatory/pain reliever (e.g. Motrin, Advil)      Sinusitis, Adult  Sinusitis is soreness and inflammation of your sinuses. Sinuses are hollow spaces in the bones around your face. Your sinuses are located:  · Around your eyes.  · In the middle of your forehead.  · Behind your nose.  · In your cheekbones.  Your sinuses and nasal passages are lined with a stringy fluid (mucus). Mucus normally drains out of your sinuses. When your nasal tissues become inflamed or swollen, the mucus can become trapped or blocked so air cannot flow through your sinuses. This allows bacteria, viruses, and funguses to grow, which leads to infection.  Sinusitis can develop quickly and last for 7?10 days (acute) or for more than 12 weeks (chronic). Sinusitis often develops after a cold.  What are the causes?  This condition is caused by anything that creates swelling in the sinuses or stops mucus from draining, including:  · Allergies.  · Asthma.  · Bacterial or viral infection.  · Abnormally shaped bones between the nasal passages.  · Nasal growths that contain mucus (nasal polyps).  · Narrow sinus openings.  · Pollutants, such as chemicals or irritants in the air.  · A foreign object stuck in the nose.  · A fungal infection. This is rare.  What increases the risk?  The following factors may make you more likely to develop this condition:  · Having allergies or asthma.  · Having had a recent cold or respiratory tract infection.  · Having structural deformities or blockages in your nose or sinuses.  · Having a weak immune system.  · Doing a lot of swimming or diving.  · Overusing nasal sprays.  · Smoking.  What are the signs or symptoms?  The main  symptoms of this condition are pain and a feeling of pressure around the affected sinuses. Other symptoms include:  · Upper toothache.  · Earache.  · Headache.  · Bad breath.  · Decreased sense of smell and taste.  · A cough that may get worse at night.  · Fatigue.  · Fever.  · Thick drainage from your nose. The drainage is often green and it may contain pus (purulent).  · Stuffy nose or congestion.  · Postnasal drip. This is when extra mucus collects in the throat or back of the nose.  · Swelling and warmth over the affected sinuses.  · Sore throat.  · Sensitivity to light.  How is this diagnosed?  This condition is diagnosed based on symptoms, a medical history, and a physical exam. To find out if your condition is acute or chronic, your health care provider may:  · Look in your nose for signs of nasal polyps.  · Tap over the affected sinus to check for signs of infection.  · View the inside of your sinuses using an imaging device that has a light attached (endoscope).  If your health care provider suspects that you have chronic sinusitis, you may also:  · Be tested for allergies.  · Have a sample of mucus taken from your nose (nasal culture) and checked for bacteria.  · Have a mucus sample examined to see if your sinusitis is related to an allergy.  If your sinusitis does not respond to treatment and it lasts longer than 8 weeks, you may have an MRI or CT scan to check your sinuses. These scans also help to determine how severe your infection is.  In rare cases, a bone biopsy may be done to rule out more serious types of fungal sinus disease.  How is this treated?  Treatment for sinusitis depends on the cause and whether your condition is chronic or acute. If a virus is causing your sinusitis, your symptoms will go away on their own within 10 days. You may be given medicines to relieve your symptoms, including:  · Topical nasal decongestants. They shrink swollen nasal passages and let mucus drain from your  sinuses.  · Antihistamines. These drugs block inflammation that is triggered by allergies. This can help to ease swelling in your nose and sinuses.  · Topical nasal corticosteroids. These are nasal sprays that ease inflammation and swelling in your nose and sinuses.  · Nasal saline washes. These rinses can help to get rid of thick mucus in your nose.  If your condition is caused by bacteria, you will be given an antibiotic medicine. If your condition is caused by a fungus, you will be given an antifungal medicine.  Surgery may be needed to correct underlying conditions, such as narrow nasal passages. Surgery may also be needed to remove polyps.  Follow these instructions at home:  Medicines  · Take, use, or apply over-the-counter and prescription medicines only as told by your health care provider. These may include nasal sprays.  · If you were prescribed an antibiotic medicine, take it as told by your health care provider. Do not stop taking the antibiotic even if you start to feel better.  Hydrate and Humidify  · Drink enough water to keep your urine clear or pale yellow. Staying hydrated will help to thin your mucus.  · Use a cool mist humidifier to keep the humidity level in your home above 50%.  · Inhale steam for 10-15 minutes, 3-4 times a day or as told by your health care provider. You can do this in the bathroom while a hot shower is running.  · Limit your exposure to cool or dry air.  Rest  · Rest as much as possible.  · Sleep with your head raised (elevated).  · Make sure to get enough sleep each night.  General instructions  · Apply a warm, moist washcloth to your face 3-4 times a day or as told by your health care provider. This will help with discomfort.  · Wash your hands often with soap and water to reduce your exposure to viruses and other germs. If soap and water are not available, use hand .  · Do not smoke. Avoid being around people who are smoking (secondhand smoke).  · Keep all  follow-up visits as told by your health care provider. This is important.  Contact a health care provider if:  · You have a fever.  · Your symptoms get worse.  · Your symptoms do not improve within 10 days.  Get help right away if:  · You have a severe headache.  · You have persistent vomiting.  · You have pain or swelling around your face or eyes.  · You have vision problems.  · You develop confusion.  · Your neck is stiff.  · You have trouble breathing.  This information is not intended to replace advice given to you by your health care provider. Make sure you discuss any questions you have with your health care provider.  Document Released: 12/18/2006 Document Revised: 08/13/2017 Document Reviewed: 10/12/2016  [employer] Interactive Patient Education © 2017 [employer]

## 2020-03-11 NOTE — LETTER
March 11, 2020         Patient: Zena Hui   YOB: 1955   Date of Visit: 3/11/2020           To Whom it May Concern:    Zena Hui was seen in my clinic on 3/11/2020 due to illness. The patient may return to work 3/14/2020 or sooner if symptoms are improved and the patient is feeling better.     If you have any questions or concerns, please don't hesitate to call.        Sincerely,           KJ Russell.  Electronically Signed

## 2020-03-11 NOTE — PROGRESS NOTES
Subjective:     Zena Hui is a 64 y.o. female who presents for Sinus Problem (unable to sleep properly, face, feels warm, neck pain,  ears feel full x 3 days) and Vertigo (nauseous, dizzy x today)       Sinus Problem   This is a new problem. Episode onset: 3 days ago. The problem has been gradually worsening since onset. The pain is moderate. Associated symptoms include congestion, ear pain (Fullness), headaches and sinus pressure. Pertinent negatives include no chills or shortness of breath. Past treatments include nothing.     PMH:  has a past medical history of Blood clotting disorder (HCC), Bronchitis, Essential hypertension, benign, High cholesterol, Hyperlipidemia, Mixed hyperlipidemia, Organic sleep apnea, unspecified, Osteopenia, Renal disorder (04/2019), and Type II or unspecified type diabetes mellitus without mention of complication, not stated as uncontrolled. She also has no past medical history of Breast cancer (HCC).    MEDS:   Current Outpatient Medications:   •  rosuvastatin (CRESTOR) 20 MG Tab, TAKE 1 TABLET BY MOUTH DAILY, Disp: , Rfl:   •  doxycycline (MONODOX) 100 MG capsule, Take 1 Cap by mouth 2 times a day for 5 days., Disp: 10 Cap, Rfl: 0  •  meclizine (ANTIVERT) 25 MG Tab, Take 1 Tab by mouth 3 times a day as needed for Dizziness or Nausea/Vomiting., Disp: 30 Tab, Rfl: 0  •  Dulaglutide (TRULICITY SC), Inject 1 Each as instructed every Monday., Disp: , Rfl:   •  losartan-hydrochlorothiazide (HYZAAR) 100-25 MG per tablet, Take 1 Tab by mouth every day., Disp: 90 Tab, Rfl: 3  •  glimepiride (AMARYL) 4 MG TABS, Take 1 Tab by mouth 2 times a day., Disp: 180 Tab, Rfl: 3  •  metformin (GLUCOPHAGE) 500 MG TABS, Take 2 Tabs by mouth 2 times a day, with meals., Disp: 360 Tab, Rfl: 3  •  aspirin 81 MG tablet, Take 81 mg by mouth every day., Disp: , Rfl:   •  simvastatin (ZOCOR) 40 MG TABS, Take 1 Tab by mouth every evening. (Patient not taking: Reported on 3/11/2020), Disp: 90 Tab, Rfl:  "3    ALLERGIES:   Allergies   Allergen Reactions   • Amoxicillin      Diarrhea     • Calcium      Constipation     SURGHX:   Past Surgical History:   Procedure Laterality Date   • CYSTOSCOPY N/A 7/5/2019    Procedure: CYSTOSCOPY;  Surgeon: Espinoza Orr M.D.;  Location: SURGERY [facility];  Service: Urology   • URETEROSCOPY Right 7/5/2019    Procedure: URETEROSCOPY;  Surgeon: Espinoza Orr M.D.;  Location: SURGERY [facility];  Service: Urology   • LASERTRIPSY Right 7/5/2019    Procedure: LITHOTRIPSY, USING LASER;  Surgeon: Espinoza Orr M.D.;  Location: SURGERY [facility];  Service: Urology   • STENT PLACEMENT Right 7/5/2019    Procedure: STENT PLACEMENT;  Surgeon: Espinoza Orr M.D.;  Location: SURGERY [facility];  Service: Urology   • COLONOSCOPY WITH POLYP  October 2007    tubular adenoma   • EMBOLECTOMY  1998    remove blood clot from spinal cord     SOCHX:  reports that she has never smoked. She has never used smokeless tobacco. She reports that she does not drink alcohol or use drugs.     FH: Reviewed with patient, not pertinent to this visit.    Review of Systems   Constitutional: Positive for malaise/fatigue. Negative for chills and fever.   HENT: Positive for congestion, ear pain (Fullness), sinus pressure and sinus pain.    Eyes: Negative for blurred vision.   Respiratory: Negative.  Negative for shortness of breath.    Cardiovascular: Negative.    Gastrointestinal: Positive for nausea and vomiting.   Neurological: Positive for dizziness and headaches. Negative for tingling, sensory change and weakness.   Endo/Heme/Allergies: Negative for environmental allergies.   All other systems reviewed and are negative.    Additional details per HPI.    Objective:     /70 (BP Location: Right arm, Patient Position: Sitting, BP Cuff Size: Adult long)   Pulse 84   Temp 36.7 °C (98 °F) (Temporal)   Resp 20   Ht 1.6 m (5' 3\")   Wt 94.8 kg (209 lb)   LMP 01/01/2009   SpO2 " 95%   BMI 37.02 kg/m²     Physical Exam  Vitals signs reviewed.   Constitutional:       General: She is not in acute distress.     Appearance: She is well-developed. She is ill-appearing. She is not toxic-appearing or diaphoretic.   HENT:      Head: Normocephalic.      Right Ear: External ear normal.      Left Ear: External ear normal.      Ears:      Comments: Dull TMs bilaterally     Nose: Nasal tenderness and mucosal edema present.      Right Sinus: Maxillary sinus tenderness and frontal sinus tenderness present.      Left Sinus: Maxillary sinus tenderness and frontal sinus tenderness present.      Mouth/Throat:      Mouth: Mucous membranes are moist.      Pharynx: Oropharynx is clear. Uvula midline.   Eyes:      Extraocular Movements: Extraocular movements intact.      Conjunctiva/sclera: Conjunctivae normal.      Pupils: Pupils are equal, round, and reactive to light.   Neck:      Musculoskeletal: Normal range of motion.   Cardiovascular:      Rate and Rhythm: Normal rate and regular rhythm.      Pulses: Normal pulses.      Heart sounds: Normal heart sounds.   Pulmonary:      Effort: Pulmonary effort is normal. No respiratory distress.      Breath sounds: Normal breath sounds. No decreased breath sounds, wheezing, rhonchi or rales.   Abdominal:      General: Bowel sounds are normal.      Palpations: Abdomen is soft.      Tenderness: There is no abdominal tenderness.   Musculoskeletal: Normal range of motion.         General: No deformity.   Skin:     General: Skin is warm and dry.      Capillary Refill: Capillary refill takes less than 2 seconds.      Coloration: Skin is not pale.   Neurological:      General: No focal deficit present.      Mental Status: She is alert and oriented to person, place, and time.      GCS: GCS eye subscore is 4. GCS verbal subscore is 5. GCS motor subscore is 6.      Sensory: No sensory deficit.      Motor: Motor function is intact.      Coordination: Coordination normal.       Gait: Gait normal.   Psychiatric:         Behavior: Behavior normal. Behavior is cooperative.          Assessment/Plan:     1. Acute non-recurrent sinusitis, unspecified location  - doxycycline (MONODOX) 100 MG capsule; Take 1 Cap by mouth 2 times a day for 5 days.  Dispense: 10 Cap; Refill: 0    2. Dizziness  - meclizine (ANTIVERT) 25 MG Tab; Take 1 Tab by mouth 3 times a day as needed for Dizziness or Nausea/Vomiting.  Dispense: 30 Tab; Refill: 0    3. Nausea and vomiting, intractability of vomiting not specified, unspecified vomiting type  - meclizine (ANTIVERT) 25 MG Tab; Take 1 Tab by mouth 3 times a day as needed for Dizziness or Nausea/Vomiting.  Dispense: 30 Tab; Refill: 0    Various differentials discussed including allergic vs viral vs bacterial etiologies.    Rx as above sent electronically.    Discussed close monitoring and supportive measures including increasing fluids and rest as well as OTC symptom management per 's instructions.    May use any combination of the following over-the-counter medications per 's instructions:  - nasal rinses/saline sprays/neti pot  - nasal steroid sprays (e.g. Flonase, Nasacort)  - oral antihistamine (e.g. Claritin, Zyrtec)  - oral pain reliever (e.g. Tylenol)  - oral anti-inflammatory/pain reliever (e.g. Motrin, Advil)    Work note provided. Discharge summary provided.    Vital signs stable, afebrile, asymptomatic, no acute distress.    Warning signs reviewed. Return precautions discussed.    Patient advised to: Return for 1) Symptoms don't improve or worsen, or go to ER, 2) Follow up with primary care in 7-10 days.    Differential diagnosis, natural history, supportive care, and indications for immediate follow-up discussed. All questions answered. Patient agrees with the plan of care.

## 2020-04-13 ENCOUNTER — HOSPITAL ENCOUNTER (OUTPATIENT)
Dept: RADIOLOGY | Facility: MEDICAL CENTER | Age: 65
End: 2020-04-13
Attending: PHYSICIAN ASSISTANT
Payer: COMMERCIAL

## 2020-04-13 DIAGNOSIS — N20.0 CALCULUS OF KIDNEY: ICD-10-CM

## 2020-04-13 PROCEDURE — 76775 US EXAM ABDO BACK WALL LIM: CPT

## 2020-04-13 PROCEDURE — 74018 RADEX ABDOMEN 1 VIEW: CPT

## 2020-04-24 ENCOUNTER — TELEMEDICINE (OUTPATIENT)
Dept: SLEEP MEDICINE | Facility: MEDICAL CENTER | Age: 65
End: 2020-04-24
Payer: COMMERCIAL

## 2020-04-24 VITALS — WEIGHT: 208 LBS | BODY MASS INDEX: 36.86 KG/M2 | HEIGHT: 63 IN

## 2020-04-24 DIAGNOSIS — I10 ESSENTIAL HYPERTENSION: ICD-10-CM

## 2020-04-24 DIAGNOSIS — G47.33 OSA (OBSTRUCTIVE SLEEP APNEA): ICD-10-CM

## 2020-04-24 PROCEDURE — 99203 OFFICE O/P NEW LOW 30 MIN: CPT | Mod: 95,CR | Performed by: INTERNAL MEDICINE

## 2020-04-24 RX ORDER — ESTRADIOL 10 UG/1
1 INSERT VAGINAL PRN
COMMUNITY
End: 2021-06-28

## 2020-04-24 RX ORDER — TELMISARTAN AND AMLODIPINE 5; 80 MG/1; MG/1
TABLET ORAL
COMMUNITY
End: 2024-01-02

## 2020-04-24 NOTE — PROGRESS NOTES
Telemedicine Visit: New Patient     This encounter was conducted via Platform ZOOM.  Verbal consent was obtained. Patient's identity was verified.    Subjective:     CC: JASPER  Zena Hui is a 64 y.o. female presenting to establish care and to discuss the evaluation and management of struct of sleep apnea.  She was diagnosed with severe JASPER through [facility] in 2008, AHI 77/h, has been successfully using CPAP since then.  She requires a new CPAP machine and supplies.  She states she sleeps 8 hours nightly and awakens feeling rested.  Denies sleep disruption or daytime somnolence.  BMI is 36.        ROS  See HPI  Constitutional: Negative for fever, chills and malaise/fatigue.   HENT: +congestion, +vertigo.    Eyes: Negative for pain.   Respiratory: Negative for cough and shortness of breath.    Cardiovascular: Negative for leg swelling.   Gastrointestinal: Negative for nausea, vomiting, abdominal pain and diarrhea.   Genitourinary: Negative for dysuria and hematuria.   Skin: Negative for rash.   Neurological: Negative for dizziness, focal weakness and headaches.   Endo/Heme/Allergies: Does not bruise/bleed easily.   Psychiatric/Behavioral: Negative for depression.  The patient is not nervous/anxious.      Allergies   Allergen Reactions   • Amoxicillin      Diarrhea     • Calcium      Constipation       Current medicines (including changes today)  Current Outpatient Medications   Medication Sig Dispense Refill   • Telmisartan-amLODIPine 80-5 MG Tab Take  by mouth.     • Estradiol (IMVEXXY MAINTENANCE PACK) 10 MCG INSERT Insert  in vagina.     • rosuvastatin (CRESTOR) 20 MG Tab TAKE 1 TABLET BY MOUTH DAILY     • clobetasol (TEMOVATE) 0.05 % Ointment APPLY A THIN LAYER TO PERICLITORAL AREA AS DIRECTED TWICE A DAY     • Dulaglutide (TRULICITY SC) Inject 1 Each as instructed every Monday.     • glimepiride (AMARYL) 4 MG TABS Take 1 Tab by mouth 2 times a day. 180 Tab 3   • metformin (GLUCOPHAGE) 500 MG TABS Take 2 Tabs by  mouth 2 times a day, with meals. 360 Tab 3   • aspirin 81 MG tablet Take 81 mg by mouth every day.     • meclizine (ANTIVERT) 25 MG Tab Take 1 Tab by mouth 3 times a day as needed for Dizziness or Nausea/Vomiting. (Patient not taking: Reported on 2020) 30 Tab 0   • losartan-hydrochlorothiazide (HYZAAR) 100-25 MG per tablet Take 1 Tab by mouth every day. (Patient not taking: Reported on 2020) 90 Tab 3   • simvastatin (ZOCOR) 40 MG TABS Take 1 Tab by mouth every evening. (Patient not taking: Reported on 3/11/2020) 90 Tab 3     No current facility-administered medications for this visit.        She  has a past medical history of Blood clotting disorder ([facility]), Bronchitis, Essential hypertension, benign, High cholesterol, Hyperlipidemia, Mixed hyperlipidemia, Organic sleep apnea, unspecified, Osteopenia, Renal disorder (2019), Sleep apnea, and Type II or unspecified type diabetes mellitus without mention of complication, not stated as uncontrolled. She also has no past medical history of Breast cancer ([facility]).  She  has a past surgical history that includes embolectomy (); colonoscopy with polyp (2007); cystoscopy (N/A, 2019); ureteroscopy (Right, 2019); lasertripsy (Right, 2019); and stent placement (Right, 2019).      Family History   Problem Relation Age of Onset   • Arthritis Mother    • Lung Disease Mother         long term smoker   • Heart Disease Father    • Heart Disease Brother         CAD   S/P CABG     Family Status   Relation Name Status   • Mo  Alive   • Fa   at age 41        MI   • Bro  Alive       Patient Active Problem List    Diagnosis Date Noted   • Obesity (BMI 30-39.9) 2019   • DIABETES MELLITUS 2012   • Vitamin d deficiency 2010   • Elevated cholesterol 2009   • Hypertension 2009   • Osteopenia 2009   • Sleep apnea 2009          Objective:   Vitals obtained by patient:      Physical Exam:  Constitutional: Alert,  no distress, well-groomed.  Skin: No rashes in visible areas.  Eye: Round. Conjunctiva clear, lids normal. No icterus.   ENMT: Lips pink without lesions, good dentition, moist mucous membranes. Phonation normal.  Neck: No masses, no thyromegaly. Moves freely without pain.  CV: Pulse as reported by patient  Respiratory: Unlabored respiratory effort, no cough or audible wheeze  Psych: Alert and oriented x3, normal affect and mood.       Assessment and Plan:   The following treatment plan was discussed:     1. JASPER (obstructive sleep apnea)  - Overnight Home Sleep Study; Future    2. BMI 36.0-36.9,adult    3. Essential hypertension    Other orders  - Telmisartan-amLODIPine 80-5 MG Tab; Take  by mouth.  - Estradiol (IMVEXXY MAINTENANCE PACK) 10 MCG INSERT; Insert  in vagina.    The patient has a history of severe JASPER, on CPAP since 2008.  She requires an updated polysomnogram for re-diagnosis and for a replacement CPAP machine and supplies.  We will arrange for home polysomnography, and establish her with DME for new CPAP equipment.    Follow-up: Return for after sleep study.

## 2020-04-30 ENCOUNTER — HOSPITAL ENCOUNTER (OUTPATIENT)
Dept: RADIOLOGY | Facility: MEDICAL CENTER | Age: 65
End: 2020-04-30
Payer: COMMERCIAL

## 2020-05-18 ENCOUNTER — APPOINTMENT (OUTPATIENT)
Dept: SLEEP MEDICINE | Facility: MEDICAL CENTER | Age: 65
End: 2020-05-18
Attending: INTERNAL MEDICINE
Payer: COMMERCIAL

## 2020-05-18 DIAGNOSIS — G47.33 OSA (OBSTRUCTIVE SLEEP APNEA): ICD-10-CM

## 2020-06-05 ENCOUNTER — APPOINTMENT (OUTPATIENT)
Dept: RADIOLOGY | Facility: MEDICAL CENTER | Age: 65
End: 2020-06-05
Attending: OBSTETRICS & GYNECOLOGY
Payer: COMMERCIAL

## 2020-06-26 ENCOUNTER — APPOINTMENT (OUTPATIENT)
Dept: SLEEP MEDICINE | Facility: MEDICAL CENTER | Age: 65
End: 2020-06-26
Payer: COMMERCIAL

## 2020-06-29 ENCOUNTER — HOSPITAL ENCOUNTER (OUTPATIENT)
Dept: RADIOLOGY | Facility: MEDICAL CENTER | Age: 65
End: 2020-06-29
Attending: OBSTETRICS & GYNECOLOGY
Payer: COMMERCIAL

## 2020-06-29 DIAGNOSIS — Z12.31 VISIT FOR SCREENING MAMMOGRAM: ICD-10-CM

## 2020-06-29 PROCEDURE — 77067 SCR MAMMO BI INCL CAD: CPT

## 2020-07-30 ENCOUNTER — HOME STUDY (OUTPATIENT)
Dept: SLEEP MEDICINE | Facility: MEDICAL CENTER | Age: 65
End: 2020-07-30
Attending: INTERNAL MEDICINE
Payer: COMMERCIAL

## 2020-07-30 DIAGNOSIS — G47.33 OSA (OBSTRUCTIVE SLEEP APNEA): ICD-10-CM

## 2020-07-31 NOTE — PROCEDURES
The patient is a 65-year-old female with former documented history of obstructive sleep apnea.  Home polysomnography requested for requalification of CPAP therapy.    A total recording time of 353 minutes was obtained, with good-quality data noted.    There were 176 snore episodes noted associated with obstructive hypopneas.  The overall AINSLEY was 54/h and associated with desaturations to a lotus of 73%.  She spent 71% of the night below SPO2 of 90%.  Mean heart rate was 61 bpm.    Interpretation:  Severe obstructive sleep apnea, AINSLEY 54/h with desaturations to 73% SPO2.    Recommendations:  CPAP therapy is considered optimal treatment for severe JASPER.  An in laboratory CPAP titration or AutoPap therapy is advised.

## 2020-08-03 PROCEDURE — 95806 SLEEP STUDY UNATT&RESP EFFT: CPT | Performed by: INTERNAL MEDICINE

## 2020-10-05 ENCOUNTER — HOSPITAL ENCOUNTER (OUTPATIENT)
Dept: LAB | Facility: MEDICAL CENTER | Age: 65
End: 2020-10-05
Attending: FAMILY MEDICINE
Payer: COMMERCIAL

## 2020-10-05 LAB
ALBUMIN SERPL BCP-MCNC: 4 G/DL (ref 3.2–4.9)
ALBUMIN/GLOB SERPL: 1.3 G/DL
ALP SERPL-CCNC: 148 U/L (ref 30–99)
ALT SERPL-CCNC: 16 U/L (ref 2–50)
ANION GAP SERPL CALC-SCNC: 10 MMOL/L (ref 7–16)
AST SERPL-CCNC: 8 U/L (ref 12–45)
BILIRUB SERPL-MCNC: 0.5 MG/DL (ref 0.1–1.5)
BUN SERPL-MCNC: 12 MG/DL (ref 8–22)
CALCIUM SERPL-MCNC: 9.1 MG/DL (ref 8.5–10.5)
CHLORIDE SERPL-SCNC: 104 MMOL/L (ref 96–112)
CHOLEST SERPL-MCNC: 133 MG/DL (ref 100–199)
CO2 SERPL-SCNC: 25 MMOL/L (ref 20–33)
CREAT SERPL-MCNC: 0.64 MG/DL (ref 0.5–1.4)
CREAT UR-MCNC: 81.7 MG/DL
EST. AVERAGE GLUCOSE BLD GHB EST-MCNC: 246 MG/DL
FASTING STATUS PATIENT QL REPORTED: NORMAL
GLOBULIN SER CALC-MCNC: 3.1 G/DL (ref 1.9–3.5)
GLUCOSE SERPL-MCNC: 237 MG/DL (ref 65–99)
HBA1C MFR BLD: 10.2 % (ref 0–5.6)
HDLC SERPL-MCNC: 52 MG/DL
LDLC SERPL CALC-MCNC: 60 MG/DL
MICROALBUMIN UR-MCNC: 33.5 MG/DL
MICROALBUMIN/CREAT UR: 410 MG/G (ref 0–30)
POTASSIUM SERPL-SCNC: 4.3 MMOL/L (ref 3.6–5.5)
PROT SERPL-MCNC: 7.1 G/DL (ref 6–8.2)
SODIUM SERPL-SCNC: 139 MMOL/L (ref 135–145)
TRIGL SERPL-MCNC: 106 MG/DL (ref 0–149)

## 2020-10-05 PROCEDURE — 80053 COMPREHEN METABOLIC PANEL: CPT

## 2020-10-05 PROCEDURE — 82570 ASSAY OF URINE CREATININE: CPT

## 2020-10-05 PROCEDURE — 80061 LIPID PANEL: CPT

## 2020-10-05 PROCEDURE — 36415 COLL VENOUS BLD VENIPUNCTURE: CPT

## 2020-10-05 PROCEDURE — 83036 HEMOGLOBIN GLYCOSYLATED A1C: CPT

## 2020-10-05 PROCEDURE — 82043 UR ALBUMIN QUANTITATIVE: CPT

## 2020-10-28 ENCOUNTER — HOSPITAL ENCOUNTER (OUTPATIENT)
Dept: RADIOLOGY | Facility: MEDICAL CENTER | Age: 65
End: 2020-10-28
Attending: UROLOGY
Payer: COMMERCIAL

## 2020-10-28 DIAGNOSIS — N20.0 CALCULUS OF KIDNEY: ICD-10-CM

## 2020-10-28 PROCEDURE — 74018 RADEX ABDOMEN 1 VIEW: CPT

## 2020-11-13 ENCOUNTER — TELEMEDICINE (OUTPATIENT)
Dept: SLEEP MEDICINE | Facility: MEDICAL CENTER | Age: 65
End: 2020-11-13
Payer: COMMERCIAL

## 2020-11-13 VITALS — WEIGHT: 205 LBS | HEIGHT: 63 IN | BODY MASS INDEX: 36.32 KG/M2

## 2020-11-13 DIAGNOSIS — G47.33 OSA (OBSTRUCTIVE SLEEP APNEA): ICD-10-CM

## 2020-11-13 PROCEDURE — 99213 OFFICE O/P EST LOW 20 MIN: CPT | Mod: 95,CR | Performed by: INTERNAL MEDICINE

## 2020-11-13 ASSESSMENT — FIBROSIS 4 INDEX: FIB4 SCORE: .4814814814814814815

## 2020-11-13 NOTE — PROGRESS NOTES
Telemedicine: Established Patient   This evaluation was conducted via Platform ZOOM using secure and encrypted videoconferencing technology. The patient's identity was verified.    Subjective:   CC:   Chief Complaint   Patient presents with   • Follow-Up     JASPER. Last seen 04/24/2020   • Results     SS 07/30/20       Zena Hui is a 65 y.o. female presenting for evaluation and management of JASPER.  She has been successfully using CPAP since 2008.  Updated HST July 2020 showed severe JASPER with AHI 54/h and desaturations to 73%.  She required a new AutoPap machine set at 5 - 15 cm of water and presents for compliance check.  Compliance card shows 70% use for 6 hours nightly.  Average AHI is 1.0.  She describes aerophagia as well as mouth dryness with CPAP use. Uses nasal mask.  Otherwise sleeps well and denies EDS.        Young in HPI      Allergies   Allergen Reactions   • Amoxicillin      Diarrhea     • Calcium      Constipation       Current medicines (including changes today)  Current Outpatient Medications   Medication Sig Dispense Refill   • Telmisartan-amLODIPine 80-5 MG Tab Take  by mouth.     • rosuvastatin (CRESTOR) 20 MG Tab TAKE 1 TABLET BY MOUTH DAILY     • clobetasol (TEMOVATE) 0.05 % Ointment APPLY A THIN LAYER TO PERICLITORAL AREA AS DIRECTED TWICE A DAY     • Dulaglutide (TRULICITY SC) Inject 1 Each as instructed every Monday.     • losartan-hydrochlorothiazide (HYZAAR) 100-25 MG per tablet Take 1 Tab by mouth every day. 90 Tab 3   • glimepiride (AMARYL) 4 MG TABS Take 1 Tab by mouth 2 times a day. 180 Tab 3   • metformin (GLUCOPHAGE) 500 MG TABS Take 2 Tabs by mouth 2 times a day, with meals. 360 Tab 3   • simvastatin (ZOCOR) 40 MG TABS Take 1 Tab by mouth every evening. 90 Tab 3   • aspirin 81 MG tablet Take 81 mg by mouth every day.     • Estradiol (IMVEXXY MAINTENANCE PACK) 10 MCG INSERT Insert  in vagina.     • meclizine (ANTIVERT) 25 MG Tab Take 1 Tab by mouth 3 times a day as needed for  "Dizziness or Nausea/Vomiting. (Patient not taking: Reported on 4/24/2020) 30 Tab 0     No current facility-administered medications for this visit.        Patient Active Problem List    Diagnosis Date Noted   • Obesity (BMI 30-39.9) 07/05/2019   • DIABETES MELLITUS 01/12/2012   • Vitamin d deficiency 03/11/2010   • Elevated cholesterol 06/17/2009   • Hypertension 06/17/2009   • Osteopenia 06/17/2009   • Sleep apnea 06/17/2009       Family History   Problem Relation Age of Onset   • Arthritis Mother    • Lung Disease Mother         long term smoker   • Heart Disease Father    • Heart Disease Brother         CAD   S/P CABG       She  has a past medical history of Blood clotting disorder (HCC), Bronchitis, Essential hypertension, benign, High cholesterol, Hyperlipidemia, Mixed hyperlipidemia, Organic sleep apnea, unspecified, Osteopenia, Renal disorder (04/2019), Sleep apnea, and Type II or unspecified type diabetes mellitus without mention of complication, not stated as uncontrolled. She also has no past medical history of Breast cancer ([facility]).  She  has a past surgical history that includes embolectomy (1998); colonoscopy with polyp (October 2007); cystoscopy (N/A, 7/5/2019); ureteroscopy (Right, 7/5/2019); lasertripsy (Right, 7/5/2019); and stent placement (Right, 7/5/2019).       Objective:   Ht 1.6 m (5' 3\") Comment: per pt  Wt 93 kg (205 lb) Comment: per pt  LMP 01/01/2009   BMI 36.31 kg/m²     Physical Exam    Assessment and Plan:   The following treatment plan was discussed:     1. JASPER (obstructive sleep apnea)  - DME CPAP    Ledy has severe JASPER, compliant with CPAP therapy and is benefiting from treatment.  Due to aerophagia, will reduce her maximal CPAP pressures.    Adjust AutoPap: 5-14 cm H20.  Consider full facemask or chinstrap if mouth dryness persist.  Follow-up: Return in about 6 months (around 5/13/2021).         "

## 2021-02-10 ENCOUNTER — PATIENT MESSAGE (OUTPATIENT)
Dept: SLEEP MEDICINE | Facility: MEDICAL CENTER | Age: 66
End: 2021-02-10

## 2021-02-10 DIAGNOSIS — G47.33 OSA (OBSTRUCTIVE SLEEP APNEA): ICD-10-CM

## 2021-03-03 ENCOUNTER — HOSPITAL ENCOUNTER (OUTPATIENT)
Dept: LAB | Facility: MEDICAL CENTER | Age: 66
End: 2021-03-03
Attending: FAMILY MEDICINE
Payer: COMMERCIAL

## 2021-03-03 DIAGNOSIS — Z23 NEED FOR VACCINATION: ICD-10-CM

## 2021-03-03 LAB
ALBUMIN SERPL BCP-MCNC: 4.1 G/DL (ref 3.2–4.9)
ALBUMIN/GLOB SERPL: 1.3 G/DL
ALP SERPL-CCNC: 152 U/L (ref 30–99)
ALT SERPL-CCNC: 14 U/L (ref 2–50)
ANION GAP SERPL CALC-SCNC: 13 MMOL/L (ref 7–16)
AST SERPL-CCNC: 10 U/L (ref 12–45)
BILIRUB SERPL-MCNC: 0.6 MG/DL (ref 0.1–1.5)
BUN SERPL-MCNC: 10 MG/DL (ref 8–22)
CALCIUM SERPL-MCNC: 9.7 MG/DL (ref 8.5–10.5)
CHLORIDE SERPL-SCNC: 102 MMOL/L (ref 96–112)
CHOLEST SERPL-MCNC: 134 MG/DL (ref 100–199)
CO2 SERPL-SCNC: 24 MMOL/L (ref 20–33)
CREAT SERPL-MCNC: 0.65 MG/DL (ref 0.5–1.4)
EST. AVERAGE GLUCOSE BLD GHB EST-MCNC: 235 MG/DL
FASTING STATUS PATIENT QL REPORTED: NORMAL
GLOBULIN SER CALC-MCNC: 3.2 G/DL (ref 1.9–3.5)
GLUCOSE SERPL-MCNC: 175 MG/DL (ref 65–99)
HBA1C MFR BLD: 9.8 % (ref 4–5.6)
HDLC SERPL-MCNC: 53 MG/DL
LDLC SERPL CALC-MCNC: 53 MG/DL
POTASSIUM SERPL-SCNC: 4 MMOL/L (ref 3.6–5.5)
PROT SERPL-MCNC: 7.3 G/DL (ref 6–8.2)
SODIUM SERPL-SCNC: 139 MMOL/L (ref 135–145)
TRIGL SERPL-MCNC: 142 MG/DL (ref 0–149)

## 2021-03-03 PROCEDURE — 84080 ASSAY ALKALINE PHOSPHATASES: CPT

## 2021-03-03 PROCEDURE — 80053 COMPREHEN METABOLIC PANEL: CPT

## 2021-03-03 PROCEDURE — 83036 HEMOGLOBIN GLYCOSYLATED A1C: CPT

## 2021-03-03 PROCEDURE — 84075 ASSAY ALKALINE PHOSPHATASE: CPT

## 2021-03-03 PROCEDURE — 80061 LIPID PANEL: CPT

## 2021-03-03 PROCEDURE — 36415 COLL VENOUS BLD VENIPUNCTURE: CPT

## 2021-03-05 LAB
ALP BONE SERPL-CCNC: 50 U/L (ref 0–55)
ALP ISOS SERPL HS-CCNC: 0 U/L
ALP LIVER SERPL-CCNC: 98 U/L (ref 0–94)
ALP SERPL-CCNC: 148 U/L (ref 40–120)

## 2021-04-06 ENCOUNTER — HOSPITAL ENCOUNTER (OUTPATIENT)
Facility: MEDICAL CENTER | Age: 66
End: 2021-04-06
Attending: FAMILY MEDICINE
Payer: COMMERCIAL

## 2021-04-06 PROCEDURE — 87086 URINE CULTURE/COLONY COUNT: CPT

## 2021-04-09 LAB
BACTERIA UR CULT: NORMAL
SIGNIFICANT IND 70042: NORMAL
SITE SITE: NORMAL
SOURCE SOURCE: NORMAL

## 2021-05-21 ENCOUNTER — APPOINTMENT (RX ONLY)
Dept: URBAN - METROPOLITAN AREA CLINIC 4 | Facility: CLINIC | Age: 66
Setting detail: DERMATOLOGY
End: 2021-05-21

## 2021-05-21 DIAGNOSIS — D18.0 HEMANGIOMA: ICD-10-CM

## 2021-05-21 DIAGNOSIS — D22 MELANOCYTIC NEVI: ICD-10-CM

## 2021-05-21 DIAGNOSIS — L82.1 OTHER SEBORRHEIC KERATOSIS: ICD-10-CM

## 2021-05-21 DIAGNOSIS — L64.8 OTHER ANDROGENIC ALOPECIA: ICD-10-CM | Status: INADEQUATELY CONTROLLED

## 2021-05-21 DIAGNOSIS — Z71.89 OTHER SPECIFIED COUNSELING: ICD-10-CM

## 2021-05-21 DIAGNOSIS — L81.4 OTHER MELANIN HYPERPIGMENTATION: ICD-10-CM

## 2021-05-21 PROBLEM — D18.01 HEMANGIOMA OF SKIN AND SUBCUTANEOUS TISSUE: Status: ACTIVE | Noted: 2021-05-21

## 2021-05-21 PROBLEM — D22.5 MELANOCYTIC NEVI OF TRUNK: Status: ACTIVE | Noted: 2021-05-21

## 2021-05-21 PROBLEM — D22.4 MELANOCYTIC NEVI OF SCALP AND NECK: Status: ACTIVE | Noted: 2021-05-21

## 2021-05-21 PROCEDURE — ? DIAGNOSIS COMMENT

## 2021-05-21 PROCEDURE — 99203 OFFICE O/P NEW LOW 30 MIN: CPT

## 2021-05-21 PROCEDURE — ? ADDITIONAL NOTES

## 2021-05-21 PROCEDURE — ? COUNSELING

## 2021-05-21 ASSESSMENT — LOCATION DETAILED DESCRIPTION DERM
LOCATION DETAILED: RIGHT SUPERIOR MEDIAL UPPER BACK
LOCATION DETAILED: RIGHT MID-UPPER BACK
LOCATION DETAILED: RIGHT ANTERIOR PROXIMAL UPPER ARM
LOCATION DETAILED: POSTERIOR MID-PARIETAL SCALP
LOCATION DETAILED: MIDDLE STERNUM
LOCATION DETAILED: LEFT ANTERIOR PROXIMAL UPPER ARM
LOCATION DETAILED: RIGHT SUPERIOR UPPER BACK
LOCATION DETAILED: MEDIAL FRONTAL SCALP
LOCATION DETAILED: SUPERIOR THORACIC SPINE

## 2021-05-21 ASSESSMENT — LOCATION SIMPLE DESCRIPTION DERM
LOCATION SIMPLE: RIGHT UPPER BACK
LOCATION SIMPLE: RIGHT UPPER ARM
LOCATION SIMPLE: POSTERIOR SCALP
LOCATION SIMPLE: UPPER BACK
LOCATION SIMPLE: FRONTAL SCALP
LOCATION SIMPLE: CHEST
LOCATION SIMPLE: LEFT UPPER ARM

## 2021-05-21 ASSESSMENT — LOCATION ZONE DERM
LOCATION ZONE: TRUNK
LOCATION ZONE: SCALP
LOCATION ZONE: ARM

## 2021-05-21 NOTE — PROCEDURE: COUNSELING
Detail Level: Generalized
Detail Level: Zone
Detail Level: Detailed
Patient Specific Counseling (Will Not Stick From Patient To Patient): Begin using Toppik spray to help with visually having hair look thicker.

## 2021-05-21 NOTE — PROCEDURE: ADDITIONAL NOTES
Detail Level: Simple
Additional Notes: Use solution every day for 3 months, will FU on growth.
Render Risk Assessment In Note?: no

## 2021-05-21 NOTE — PROCEDURE: DIAGNOSIS COMMENT
Render Risk Assessment In Note?: no
Detail Level: Simple
Comment: Will FU with bx if hair growth does not happen.

## 2021-05-26 ENCOUNTER — HOSPITAL ENCOUNTER (OUTPATIENT)
Dept: CARDIOLOGY | Facility: MEDICAL CENTER | Age: 66
End: 2021-05-26
Attending: ANESTHESIOLOGY
Payer: COMMERCIAL

## 2021-05-26 ENCOUNTER — HOSPITAL ENCOUNTER (OUTPATIENT)
Dept: LAB | Facility: MEDICAL CENTER | Age: 66
End: 2021-05-26
Attending: ANESTHESIOLOGY
Payer: COMMERCIAL

## 2021-05-26 DIAGNOSIS — Z01.810 PRE-OPERATIVE CARDIOVASCULAR EXAMINATION: ICD-10-CM

## 2021-05-26 LAB
ANION GAP SERPL CALC-SCNC: 12 MMOL/L (ref 7–16)
BUN SERPL-MCNC: 13 MG/DL (ref 8–22)
CALCIUM SERPL-MCNC: 9.2 MG/DL (ref 8.5–10.5)
CHLORIDE SERPL-SCNC: 105 MMOL/L (ref 96–112)
CO2 SERPL-SCNC: 24 MMOL/L (ref 20–33)
CREAT SERPL-MCNC: 0.63 MG/DL (ref 0.5–1.4)
EKG IMPRESSION: NORMAL
GLUCOSE SERPL-MCNC: 220 MG/DL (ref 65–99)
POTASSIUM SERPL-SCNC: 4.1 MMOL/L (ref 3.6–5.5)
SODIUM SERPL-SCNC: 141 MMOL/L (ref 135–145)

## 2021-05-26 PROCEDURE — 36415 COLL VENOUS BLD VENIPUNCTURE: CPT

## 2021-05-26 PROCEDURE — 93010 ELECTROCARDIOGRAM REPORT: CPT | Performed by: INTERNAL MEDICINE

## 2021-05-26 PROCEDURE — 93005 ELECTROCARDIOGRAM TRACING: CPT | Performed by: ANESTHESIOLOGY

## 2021-05-26 PROCEDURE — 80048 BASIC METABOLIC PNL TOTAL CA: CPT

## 2021-05-28 ENCOUNTER — HOSPITAL ENCOUNTER (OUTPATIENT)
Dept: LAB | Facility: MEDICAL CENTER | Age: 66
End: 2021-05-28
Attending: UROLOGY
Payer: COMMERCIAL

## 2021-05-28 PROCEDURE — 87086 URINE CULTURE/COLONY COUNT: CPT

## 2021-05-30 LAB
BACTERIA UR CULT: NORMAL
SIGNIFICANT IND 70042: NORMAL
SITE SITE: NORMAL
SOURCE SOURCE: NORMAL

## 2021-06-21 ENCOUNTER — PRE-ADMISSION TESTING (OUTPATIENT)
Dept: ADMISSIONS | Facility: MEDICAL CENTER | Age: 66
End: 2021-06-21
Attending: UROLOGY
Payer: COMMERCIAL

## 2021-06-21 DIAGNOSIS — Z01.812 PRE-OPERATIVE LABORATORY EXAMINATION: ICD-10-CM

## 2021-06-21 LAB
ANION GAP SERPL CALC-SCNC: 12 MMOL/L (ref 7–16)
APPEARANCE UR: CLEAR
BACTERIA #/AREA URNS HPF: NEGATIVE /HPF
BILIRUB UR QL STRIP.AUTO: NEGATIVE
BUN SERPL-MCNC: 13 MG/DL (ref 8–22)
CALCIUM SERPL-MCNC: 9.4 MG/DL (ref 8.5–10.5)
CHLORIDE SERPL-SCNC: 103 MMOL/L (ref 96–112)
CO2 SERPL-SCNC: 24 MMOL/L (ref 20–33)
COLOR UR: YELLOW
CREAT SERPL-MCNC: 0.68 MG/DL (ref 0.5–1.4)
EPI CELLS #/AREA URNS HPF: NEGATIVE /HPF
EST. AVERAGE GLUCOSE BLD GHB EST-MCNC: 260 MG/DL
GLUCOSE SERPL-MCNC: 235 MG/DL (ref 65–99)
GLUCOSE UR STRIP.AUTO-MCNC: >=1000 MG/DL
HBA1C MFR BLD: 10.7 % (ref 4–5.6)
HYALINE CASTS #/AREA URNS LPF: ABNORMAL /LPF
KETONES UR STRIP.AUTO-MCNC: ABNORMAL MG/DL
LEUKOCYTE ESTERASE UR QL STRIP.AUTO: NEGATIVE
MICRO URNS: ABNORMAL
NITRITE UR QL STRIP.AUTO: NEGATIVE
PH UR STRIP.AUTO: 5 [PH] (ref 5–8)
POTASSIUM SERPL-SCNC: 4 MMOL/L (ref 3.6–5.5)
PROT UR QL STRIP: 30 MG/DL
RBC # URNS HPF: ABNORMAL /HPF
RBC UR QL AUTO: ABNORMAL
SODIUM SERPL-SCNC: 139 MMOL/L (ref 135–145)
SP GR UR STRIP.AUTO: 1.03
UROBILINOGEN UR STRIP.AUTO-MCNC: 0.2 MG/DL
WBC #/AREA URNS HPF: ABNORMAL /HPF

## 2021-06-21 PROCEDURE — 80048 BASIC METABOLIC PNL TOTAL CA: CPT

## 2021-06-21 PROCEDURE — 81001 URINALYSIS AUTO W/SCOPE: CPT

## 2021-06-21 PROCEDURE — 87086 URINE CULTURE/COLONY COUNT: CPT

## 2021-06-21 PROCEDURE — 83036 HEMOGLOBIN GLYCOSYLATED A1C: CPT

## 2021-06-21 PROCEDURE — 36415 COLL VENOUS BLD VENIPUNCTURE: CPT

## 2021-06-21 ASSESSMENT — FIBROSIS 4 INDEX: FIB4 SCORE: 0.65

## 2021-06-22 ENCOUNTER — TELEPHONE (OUTPATIENT)
Dept: CARDIOLOGY | Facility: MEDICAL CENTER | Age: 66
End: 2021-06-22

## 2021-06-22 NOTE — TELEPHONE ENCOUNTER
LVM asking if patient has seen another Cardiologist in the past or had any cardiac testing done outside of [facility] to call with information so that records can be requested prior to appointment.scheduled with Dr ACKERMAN on 6/28/2021.

## 2021-06-23 LAB
BACTERIA UR CULT: NORMAL
SIGNIFICANT IND 70042: NORMAL
SITE SITE: NORMAL
SOURCE SOURCE: NORMAL

## 2021-06-25 ENCOUNTER — TELEMEDICINE (OUTPATIENT)
Dept: SLEEP MEDICINE | Facility: MEDICAL CENTER | Age: 66
End: 2021-06-25
Payer: COMMERCIAL

## 2021-06-25 VITALS
SYSTOLIC BLOOD PRESSURE: 134 MMHG | WEIGHT: 205 LBS | BODY MASS INDEX: 36.32 KG/M2 | HEIGHT: 63 IN | DIASTOLIC BLOOD PRESSURE: 73 MMHG | HEART RATE: 76 BPM

## 2021-06-25 DIAGNOSIS — G47.33 OSA (OBSTRUCTIVE SLEEP APNEA): ICD-10-CM

## 2021-06-25 PROCEDURE — 99213 OFFICE O/P EST LOW 20 MIN: CPT | Mod: 95 | Performed by: INTERNAL MEDICINE

## 2021-06-25 ASSESSMENT — FIBROSIS 4 INDEX: FIB4 SCORE: 0.65

## 2021-06-25 NOTE — PROGRESS NOTES
"Telemedicine: Established Patient   This evaluation was conducted via Platform ZOOM using secure and encrypted videoconferencing technology. The patient's identity was verified.      Subjective:   CC:   Chief Complaint   Patient presents with   • Follow-Up     6m FV, last seen 11/13/20 by Dr Barroso for JASPER   • Other     compliance scanned        Zena Hui is a 66 y.o. female presenting for evaluation and management of JASPER. She has been successfully using CPAP since 2008.  Updated HST July 2020 showed severe JASPER with AHI 54/h and desaturations to 73%. Uses Autopap:5-12 cm H20. Compliance download shows 41% CPAP use for almost 6 hrs/nightly. Average AHI 0.9. Sleeps great on CPAP \"when I use it\" however has been falling asleep in recliner she finds it more comfortable in her bed. Denies EDS.        ROS:As in HPI      Allergies   Allergen Reactions   • Amoxicillin      Diarrhea     • Calcium      Constipation       Current medicines (including changes today)  Current Outpatient Medications   Medication Sig Dispense Refill   • Telmisartan-amLODIPine 80-5 MG Tab Take  by mouth.     • Estradiol (IMVEXXY MAINTENANCE PACK) 10 MCG INSERT Insert 1 Suppository into the vagina as needed.     • rosuvastatin (CRESTOR) 20 MG Tab TAKE 1 TABLET BY MOUTH DAILY     • meclizine (ANTIVERT) 25 MG Tab Take 1 Tab by mouth 3 times a day as needed for Dizziness or Nausea/Vomiting. (Patient not taking: Reported on 4/24/2020) 30 Tab 0   • clobetasol (TEMOVATE) 0.05 % Ointment Apply 1 Application topically 2 times a day as needed (rash).     • Dulaglutide (TRULICITY SC) Inject 1 Each as instructed every Monday.     • losartan-hydrochlorothiazide (HYZAAR) 100-25 MG per tablet Take 1 Tab by mouth every day. (Patient not taking: Reported on 6/21/2021) 90 Tab 3   • glimepiride (AMARYL) 4 MG TABS Take 1 Tab by mouth 2 times a day. 180 Tab 3   • metformin (GLUCOPHAGE) 500 MG TABS Take 2 Tabs by mouth 2 times a day, with meals. 360 Tab 3   • " "simvastatin (ZOCOR) 40 MG TABS Take 1 Tab by mouth every evening. (Patient not taking: Reported on 6/21/2021) 90 Tab 3   • aspirin 81 MG tablet Take 81 mg by mouth every day.       No current facility-administered medications for this visit.       Patient Active Problem List    Diagnosis Date Noted   • Obesity (BMI 30-39.9) 07/05/2019   • DIABETES MELLITUS 01/12/2012   • Vitamin d deficiency 03/11/2010   • Elevated cholesterol 06/17/2009   • Hypertension 06/17/2009   • Osteopenia 06/17/2009   • Sleep apnea 06/17/2009       Family History   Problem Relation Age of Onset   • Arthritis Mother    • Lung Disease Mother         long term smoker   • Heart Disease Father    • Heart Disease Brother         CAD   S/P CABG       She  has a past medical history of Arthritis (2021), Blood clotting disorder (HCC), Bronchitis, Essential hypertension, benign, High cholesterol, Hyperlipidemia, Mixed hyperlipidemia, Organic sleep apnea, unspecified, Osteopenia, Renal disorder (04/2019), Sleep apnea, and Type II or unspecified type diabetes mellitus without mention of complication, not stated as uncontrolled. She also has no past medical history of Breast cancer ([facility]).  She  has a past surgical history that includes embolectomy (1998); colonoscopy with polyp (October 2007); cystoscopy (N/A, 7/5/2019); ureteroscopy (Right, 7/5/2019); lasertripsy (Right, 7/5/2019); stent placement (Right, 7/5/2019); other orthopedic surgery (N/A, 04/1998); and other (07/2019).       Objective:   /73 Comment: left arm, pt stated  Pulse 76 Comment: pt stated  Ht 1.6 m (5' 3\")   Wt 93 kg (205 lb)   LMP 01/01/2009   BMI 36.31 kg/m²     Physical Exam    Assessment and Plan:   The following treatment plan was discussed:     1. JASPER (obstructive sleep apnea)    Ledy has severe JASPER, on AutoPap:5-12 cm H20 with normal AHI on treatment however reduced compliance as she is sleeping in her recliner. We discussed moving her CPAP unit next to the recliner " as she spends most nights there. Encouraged nightly CPAP use.  Maintain current CPAP pressures.  Follow-up: Return in about 6 months (around 12/25/2021).

## 2021-06-28 ENCOUNTER — HOSPITAL ENCOUNTER (OUTPATIENT)
Dept: LAB | Facility: MEDICAL CENTER | Age: 66
End: 2021-06-28
Attending: FAMILY MEDICINE
Payer: COMMERCIAL

## 2021-06-28 ENCOUNTER — OFFICE VISIT (OUTPATIENT)
Dept: CARDIOLOGY | Facility: MEDICAL CENTER | Age: 66
End: 2021-06-28
Payer: COMMERCIAL

## 2021-06-28 VITALS
HEART RATE: 86 BPM | DIASTOLIC BLOOD PRESSURE: 78 MMHG | OXYGEN SATURATION: 93 % | HEIGHT: 63 IN | BODY MASS INDEX: 37.03 KG/M2 | SYSTOLIC BLOOD PRESSURE: 134 MMHG | WEIGHT: 209 LBS

## 2021-06-28 DIAGNOSIS — E78.00 ELEVATED CHOLESTEROL: ICD-10-CM

## 2021-06-28 DIAGNOSIS — I10 ESSENTIAL HYPERTENSION: ICD-10-CM

## 2021-06-28 DIAGNOSIS — R94.31 NONSPECIFIC ABNORMAL ELECTROCARDIOGRAM (ECG) (EKG): ICD-10-CM

## 2021-06-28 DIAGNOSIS — Z01.810 PRE-OPERATIVE CARDIOVASCULAR EXAMINATION: ICD-10-CM

## 2021-06-28 DIAGNOSIS — E11.9 TYPE 2 DIABETES MELLITUS WITHOUT COMPLICATION, WITHOUT LONG-TERM CURRENT USE OF INSULIN (HCC): ICD-10-CM

## 2021-06-28 DIAGNOSIS — G47.39 OTHER SLEEP APNEA: ICD-10-CM

## 2021-06-28 DIAGNOSIS — E66.9 OBESITY (BMI 30-39.9): ICD-10-CM

## 2021-06-28 LAB
ALBUMIN SERPL BCP-MCNC: 4.1 G/DL (ref 3.2–4.9)
ALBUMIN/GLOB SERPL: 1.4 G/DL
ALP SERPL-CCNC: 142 U/L (ref 30–99)
ALT SERPL-CCNC: 22 U/L (ref 2–50)
ANION GAP SERPL CALC-SCNC: 15 MMOL/L (ref 7–16)
AST SERPL-CCNC: 18 U/L (ref 12–45)
BILIRUB SERPL-MCNC: 0.6 MG/DL (ref 0.1–1.5)
BUN SERPL-MCNC: 10 MG/DL (ref 8–22)
CALCIUM SERPL-MCNC: 9 MG/DL (ref 8.5–10.5)
CHLORIDE SERPL-SCNC: 106 MMOL/L (ref 96–112)
CHOLEST SERPL-MCNC: 140 MG/DL (ref 100–199)
CO2 SERPL-SCNC: 22 MMOL/L (ref 20–33)
CREAT SERPL-MCNC: 0.65 MG/DL (ref 0.5–1.4)
EKG IMPRESSION: NORMAL
EST. AVERAGE GLUCOSE BLD GHB EST-MCNC: 246 MG/DL
FASTING STATUS PATIENT QL REPORTED: NORMAL
GLOBULIN SER CALC-MCNC: 2.9 G/DL (ref 1.9–3.5)
GLUCOSE SERPL-MCNC: 213 MG/DL (ref 65–99)
HBA1C MFR BLD: 10.2 % (ref 4–5.6)
HDLC SERPL-MCNC: 51 MG/DL
LDLC SERPL CALC-MCNC: 63 MG/DL
POTASSIUM SERPL-SCNC: 3.7 MMOL/L (ref 3.6–5.5)
PROT SERPL-MCNC: 7 G/DL (ref 6–8.2)
SODIUM SERPL-SCNC: 143 MMOL/L (ref 135–145)
TRIGL SERPL-MCNC: 131 MG/DL (ref 0–149)

## 2021-06-28 PROCEDURE — 83036 HEMOGLOBIN GLYCOSYLATED A1C: CPT

## 2021-06-28 PROCEDURE — 93000 ELECTROCARDIOGRAM COMPLETE: CPT | Performed by: INTERNAL MEDICINE

## 2021-06-28 PROCEDURE — 36415 COLL VENOUS BLD VENIPUNCTURE: CPT

## 2021-06-28 PROCEDURE — 99204 OFFICE O/P NEW MOD 45 MIN: CPT | Performed by: INTERNAL MEDICINE

## 2021-06-28 PROCEDURE — 80053 COMPREHEN METABOLIC PANEL: CPT

## 2021-06-28 PROCEDURE — 80061 LIPID PANEL: CPT

## 2021-06-28 RX ORDER — DULAGLUTIDE 3 MG/.5ML
INJECTION, SOLUTION SUBCUTANEOUS
COMMUNITY
End: 2022-05-09

## 2021-06-28 ASSESSMENT — FIBROSIS 4 INDEX: FIB4 SCORE: 0.65

## 2021-06-28 NOTE — PROGRESS NOTES
Chief Complaint   Patient presents with   • Abnormal EKG     new patient       Subjective:   Ledy Hui is a 66 y.o. female who presents today for initial consultation regarding abnormal EKG obtained preoperative setting.  She has a history of DM two with an A1c of 10.2, HTN, HLP, JASPER, obesity.  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  She is asymptomatic and can achieve greater than four METS on a daily basis.  She just rode her bike and went on a long walk this weekend.  She does not smoke drink or do drugs.  She has a strong family history of CAD.  She is planning to undergo lithotripsy and blepharoplasty.    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   • Bronchitis    • Essential hypertension, benign    • High cholesterol    • Hyperlipidemia    • Mixed hyperlipidemia    • Organic sleep apnea, unspecified    • Osteopenia    • Renal disorder 04/2019    kidney stones   • Sleep apnea    • Type II or unspecified type diabetes mellitus without mention of complication, not stated as uncontrolled     oral meds     Past Surgical History:   Procedure Laterality Date   • CYSTOSCOPY N/A 7/5/2019    Procedure: CYSTOSCOPY;  Surgeon: Espinoza Orr M.D.;  Location: [facility];  Service: Urology   • URETEROSCOPY Right 7/5/2019    Procedure: URETEROSCOPY;  Surgeon: Espinoza Orr M.D.;  Location: [facility];  Service: Urology   • LASERTRIPSY Right 7/5/2019    Procedure: LITHOTRIPSY, USING LASER;  Surgeon: Espinoza Orr M.D.;  Location: [facility];  Service: Urology   • STENT PLACEMENT Right 7/5/2019    Procedure: STENT PLACEMENT;  Surgeon: Espinoza Orr M.D.;  Location: [facility];  Service: Urology   • OTHER  07/2019    lithotripsy   • COLONOSCOPY WITH POLYP  October 2007    tubular adenoma   • OTHER ORTHOPEDIC SURGERY N/A 04/1998     laminectomy   • EMBOLECTOMY  1998    remove blood clot from spinal cord     Family History   Problem Relation Age of Onset   • Arthritis Mother    • Lung Disease Mother         long term smoker   • Heart Disease Father    • Heart Disease Brother         CAD   S/P CABG     Social History     Socioeconomic History   • Marital status:      Spouse name: Not on file   • Number of children: Not on file   • Years of education: Not on file   • Highest education level: Not on file   Occupational History   • Not on file   Tobacco Use   • Smoking status: Never Smoker   • Smokeless tobacco: Never Used   Vaping Use   • Vaping Use: Never used   Substance and Sexual Activity   • Alcohol use: No   • Drug use: No   • Sexual activity: Yes     Partners: Male   Other Topics Concern   • Not on file   Social History Narrative   • Not on file     Social Determinants of Health     Financial Resource Strain:    • Difficulty of Paying Living Expenses:    Food Insecurity:    • Worried About Running Out of Food in the Last Year:    • Ran Out of Food in the Last Year:    Transportation Needs:    • Lack of Transportation (Medical):    • Lack of Transportation (Non-Medical):    Physical Activity:    • Days of Exercise per Week:    • Minutes of Exercise per Session:    Stress:    • Feeling of Stress :    Social Connections:    • Frequency of Communication with Friends and Family:    • Frequency of Social Gatherings with Friends and Family:    • Attends [employer] Services:    • Active Member of Clubs or Organizations:    • Attends Club or Organization Meetings:    • Marital Status:    Intimate Partner Violence:    • Fear of Current or Ex-Partner:    • Emotionally Abused:    • Physically Abused:    • Sexually Abused:      Allergies   Allergen Reactions   • Amoxicillin      Diarrhea     • Calcium      Constipation     Outpatient Encounter Medications as of 6/28/2021   Medication Sig Dispense Refill   • Dulaglutide (TRULICITY) 3 MG/0.5ML  "Solution Pen-injector Inject  under the skin.     • Telmisartan-amLODIPine 80-5 MG Tab Take  by mouth.     • rosuvastatin (CRESTOR) 20 MG Tab TAKE 1 TABLET BY MOUTH DAILY     • glimepiride (AMARYL) 4 MG TABS Take 1 Tab by mouth 2 times a day. 180 Tab 3   • metformin (GLUCOPHAGE) 500 MG TABS Take 2 Tabs by mouth 2 times a day, with meals. 360 Tab 3   • aspirin 81 MG tablet Take 81 mg by mouth every day.     • Estradiol (IMVEXXY MAINTENANCE PACK) 10 MCG INSERT Insert 1 Suppository into the vagina as needed.     • meclizine (ANTIVERT) 25 MG Tab Take 1 Tab by mouth 3 times a day as needed for Dizziness or Nausea/Vomiting. (Patient not taking: Reported on 4/24/2020) 30 Tab 0   • clobetasol (TEMOVATE) 0.05 % Ointment Apply 1 Application topically 2 times a day as needed (rash).     • Dulaglutide (TRULICITY SC) Inject 1 Each as instructed every Monday.     • losartan-hydrochlorothiazide (HYZAAR) 100-25 MG per tablet Take 1 Tab by mouth every day. (Patient not taking: Reported on 6/21/2021) 90 Tab 3   • simvastatin (ZOCOR) 40 MG TABS Take 1 Tab by mouth every evening. (Patient not taking: Reported on 6/21/2021) 90 Tab 3     No facility-administered encounter medications on file as of 6/28/2021.     Review of Systems   All other systems reviewed and are negative.       Objective:   /78 (BP Location: Left arm, Patient Position: Sitting)   Pulse 86   Ht 1.6 m (5' 3\")   Wt 94.8 kg (209 lb)   LMP 01/01/2009   SpO2 93%   BMI 37.02 kg/m²     Physical Exam   Constitutional: She is oriented to person, place, and time. She appears well-developed. No distress.   HENT:   Head: Normocephalic and atraumatic.   Right Ear: External ear normal.   Left Ear: External ear normal.   Mouth/Throat: No oropharyngeal exudate.   Eyes: Pupils are equal, round, and reactive to light. Conjunctivae are normal. Right eye exhibits no discharge. Left eye exhibits no discharge. No scleral icterus.   Neck: No JVD present. No tracheal deviation " present. No thyromegaly present.   Cardiovascular: Normal rate, regular rhythm, S1 normal, S2 normal and normal heart sounds. PMI is not displaced. Exam reveals no gallop, no S3, no S4 and no friction rub.   No murmur heard.  Pulses:       Carotid pulses are 2+ on the right side and 2+ on the left side.       Radial pulses are 2+ on the right side and 2+ on the left side.        Popliteal pulses are 2+ on the right side and 2+ on the left side.        Dorsalis pedis pulses are 2+ on the right side and 2+ on the left side.        Posterior tibial pulses are 2+ on the right side and 2+ on the left side.   Pulmonary/Chest: Effort normal and breath sounds normal. No respiratory distress. She has no wheezes. She has no rales. She exhibits no tenderness.   Abdominal: Soft. Bowel sounds are normal. She exhibits no distension. There is no abdominal tenderness.   Musculoskeletal:         General: No swelling or tenderness. Normal range of motion.      Cervical back: Normal range of motion and neck supple.   Neurological: She is alert and oriented to person, place, and time. No cranial nerve deficit (Cranial nerves II through XII grossly intact).   Skin: Skin is warm and dry. No rash noted. She is not diaphoretic. No erythema.   Psychiatric: Her behavior is normal. Judgment and thought content normal.   Vitals reviewed.    LABS:  Lab Results   Component Value Date/Time    CHOLSTRLTOT 134 03/03/2021 06:20 AM    LDL 53 03/03/2021 06:20 AM    HDL 53 03/03/2021 06:20 AM    TRIGLYCERIDE 142 03/03/2021 06:20 AM       Lab Results   Component Value Date/Time    WBC 7.3 07/05/2019 12:25 PM    WBC 7.0 07/20/2010 08:50 AM    RBC 4.82 07/05/2019 12:25 PM    RBC 4.96 07/20/2010 08:50 AM    HEMOGLOBIN 13.3 07/05/2019 12:25 PM    HEMATOCRIT 42.3 07/05/2019 12:25 PM    MCV 87.8 07/05/2019 12:25 PM    MCV 90 07/20/2010 08:50 AM    NEUTSPOLYS 65.5 03/27/2013 12:18 PM    LYMPHOCYTES 28.2 03/27/2013 12:18 PM    MONOCYTES 4.7 03/27/2013 12:18 PM     EOSINOPHILS 1.3 2013 12:18 PM    BASOPHILS 0.3 2013 12:18 PM    HYPOCHROMIA 1+ 2013 12:18 PM     Lab Results   Component Value Date/Time    SODIUM 139 2021 02:32 PM    POTASSIUM 4.0 2021 02:32 PM    CHLORIDE 103 2021 02:32 PM    CO2 24 2021 02:32 PM    GLUCOSE 235 (H) 2021 02:32 PM    BUN 13 2021 02:32 PM    CREATININE 0.68 2021 02:32 PM    CREATININE 0.77 2010 08:50 AM    BUNCREATRAT 18 2010 08:50 AM    GLOMRATE >59 2010 08:50 AM     Lab Results   Component Value Date    HBA1C 10.2 (H) 2021      Lab Results   Component Value Date/Time    ALKPHOSPHAT 152 (H) 2021 06:20 AM    ASTSGOT 10 (L) 2021 06:20 AM    ALTSGPT 14 2021 06:20 AM    TBILIRUBIN 0.6 2021 06:20 AM      No results found for: BNPBTYPENAT   No results found for: TSH  No results found for: PROTHROMBTM, INR     EKG (2021):  I have personally reviewed the EKG this visit and discussed with the patient.  Sinus rhythm 81 bpm.  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        Assessment:     1. Nonspecific abnormal electrocardiogram (ECG) (EKG)  EKG    EC-ECHOCARDIOGRAM COMPLETE W/O CONT    NM-CARDIAC STRESS TEST   2. Pre-operative cardiovascular examination     3. Type 2 diabetes mellitus without complication, without long-term current use of insulin (HCC)     4. Elevated cholesterol     5. Essential hypertension     6. Other sleep apnea     7. Obesity (BMI 30-39.9)         Medical Decision Making:  Today's Assessment / Status / Plan:     She feels well and is low cardiovascular risk for her upcoming surgeries based on her functional capacity.  Do recommend clarification of possible prior infarction given her multiple cardiac risk factors and precocious coronary artery disease in the family.  Recommend the followin.  Echocardiogram to assess wall motion  2.  Exercise treadmill MPI  3.  Continue statin for goal LDL less than seventy  4.  Improve  diabetic control, will defer to primary physician  5. The patient may proceed to planned surgical intervention with a low (<1%) risk of dea-procedural cardiovascular events.

## 2021-07-05 ENCOUNTER — ANESTHESIA (OUTPATIENT)
Dept: SURGERY | Facility: MEDICAL CENTER | Age: 66
End: 2021-07-05
Payer: COMMERCIAL

## 2021-07-05 ENCOUNTER — APPOINTMENT (OUTPATIENT)
Dept: RADIOLOGY | Facility: MEDICAL CENTER | Age: 66
End: 2021-07-05
Attending: UROLOGY
Payer: COMMERCIAL

## 2021-07-05 ENCOUNTER — ANESTHESIA EVENT (OUTPATIENT)
Dept: SURGERY | Facility: MEDICAL CENTER | Age: 66
End: 2021-07-05
Payer: COMMERCIAL

## 2021-07-05 ENCOUNTER — HOSPITAL ENCOUNTER (OUTPATIENT)
Facility: MEDICAL CENTER | Age: 66
End: 2021-07-05
Attending: UROLOGY | Admitting: UROLOGY
Payer: COMMERCIAL

## 2021-07-05 VITALS
HEIGHT: 63 IN | TEMPERATURE: 97.4 F | DIASTOLIC BLOOD PRESSURE: 66 MMHG | BODY MASS INDEX: 36.88 KG/M2 | SYSTOLIC BLOOD PRESSURE: 155 MMHG | RESPIRATION RATE: 14 BRPM | WEIGHT: 208.11 LBS | HEART RATE: 57 BPM | OXYGEN SATURATION: 96 %

## 2021-07-05 LAB
GLUCOSE BLD-MCNC: 160 MG/DL (ref 65–99)
PATHOLOGY CONSULT NOTE: NORMAL

## 2021-07-05 PROCEDURE — 700101 HCHG RX REV CODE 250: Performed by: ANESTHESIOLOGY

## 2021-07-05 PROCEDURE — 700117 HCHG RX CONTRAST REV CODE 255: Performed by: UROLOGY

## 2021-07-05 PROCEDURE — 160025 RECOVERY II MINUTES (STATS): Performed by: UROLOGY

## 2021-07-05 PROCEDURE — 700102 HCHG RX REV CODE 250 W/ 637 OVERRIDE(OP): Performed by: ANESTHESIOLOGY

## 2021-07-05 PROCEDURE — 700111 HCHG RX REV CODE 636 W/ 250 OVERRIDE (IP): Performed by: ANESTHESIOLOGY

## 2021-07-05 PROCEDURE — C1894 INTRO/SHEATH, NON-LASER: HCPCS | Performed by: UROLOGY

## 2021-07-05 PROCEDURE — 160009 HCHG ANES TIME/MIN: Performed by: UROLOGY

## 2021-07-05 PROCEDURE — 160048 HCHG OR STATISTICAL LEVEL 1-5: Performed by: UROLOGY

## 2021-07-05 PROCEDURE — 700105 HCHG RX REV CODE 258: Performed by: UROLOGY

## 2021-07-05 PROCEDURE — 500062 HCHG BASKET: Performed by: UROLOGY

## 2021-07-05 PROCEDURE — 88300 SURGICAL PATH GROSS: CPT

## 2021-07-05 PROCEDURE — 160039 HCHG SURGERY MINUTES - EA ADDL 1 MIN LEVEL 3: Performed by: UROLOGY

## 2021-07-05 PROCEDURE — C1758 CATHETER, URETERAL: HCPCS | Performed by: UROLOGY

## 2021-07-05 PROCEDURE — 160035 HCHG PACU - 1ST 60 MINS PHASE I: Performed by: UROLOGY

## 2021-07-05 PROCEDURE — 160046 HCHG PACU - 1ST 60 MINS PHASE II: Performed by: UROLOGY

## 2021-07-05 PROCEDURE — 700101 HCHG RX REV CODE 250: Performed by: UROLOGY

## 2021-07-05 PROCEDURE — 82962 GLUCOSE BLOOD TEST: CPT

## 2021-07-05 PROCEDURE — 502240 HCHG MISC OR SUPPLY RC 0272: Performed by: UROLOGY

## 2021-07-05 PROCEDURE — A9270 NON-COVERED ITEM OR SERVICE: HCPCS | Performed by: ANESTHESIOLOGY

## 2021-07-05 PROCEDURE — A9270 NON-COVERED ITEM OR SERVICE: HCPCS | Performed by: UROLOGY

## 2021-07-05 PROCEDURE — C1769 GUIDE WIRE: HCPCS | Performed by: UROLOGY

## 2021-07-05 PROCEDURE — 700102 HCHG RX REV CODE 250 W/ 637 OVERRIDE(OP): Performed by: UROLOGY

## 2021-07-05 PROCEDURE — C2617 STENT, NON-COR, TEM W/O DEL: HCPCS | Performed by: UROLOGY

## 2021-07-05 PROCEDURE — 160002 HCHG RECOVERY MINUTES (STAT): Performed by: UROLOGY

## 2021-07-05 PROCEDURE — 160028 HCHG SURGERY MINUTES - 1ST 30 MINS LEVEL 3: Performed by: UROLOGY

## 2021-07-05 PROCEDURE — 82365 CALCULUS SPECTROSCOPY: CPT

## 2021-07-05 DEVICE — STENT UROLOGICAL POLARIS 6X24  ULTRA: Type: IMPLANTABLE DEVICE | Site: URETER | Status: FUNCTIONAL

## 2021-07-05 RX ORDER — PHENAZOPYRIDINE HYDROCHLORIDE 200 MG/1
200 TABLET, FILM COATED ORAL 3 TIMES DAILY PRN
Qty: 12 TABLET | Refills: 0 | Status: SHIPPED | OUTPATIENT
Start: 2021-07-05 | End: 2021-10-19

## 2021-07-05 RX ORDER — LIDOCAINE HYDROCHLORIDE 20 MG/ML
INJECTION, SOLUTION EPIDURAL; INFILTRATION; INTRACAUDAL; PERINEURAL PRN
Status: DISCONTINUED | OUTPATIENT
Start: 2021-07-05 | End: 2021-07-05 | Stop reason: SURG

## 2021-07-05 RX ORDER — HYDROMORPHONE HYDROCHLORIDE 1 MG/ML
0.2 INJECTION, SOLUTION INTRAMUSCULAR; INTRAVENOUS; SUBCUTANEOUS
Status: DISCONTINUED | OUTPATIENT
Start: 2021-07-05 | End: 2021-07-05 | Stop reason: HOSPADM

## 2021-07-05 RX ORDER — KETOROLAC TROMETHAMINE 10 MG/1
10 TABLET, FILM COATED ORAL EVERY 4 HOURS PRN
Qty: 9 TABLET | Refills: 0 | Status: SHIPPED | OUTPATIENT
Start: 2021-07-05 | End: 2021-10-19

## 2021-07-05 RX ORDER — LABETALOL HYDROCHLORIDE 5 MG/ML
5 INJECTION, SOLUTION INTRAVENOUS
Status: DISCONTINUED | OUTPATIENT
Start: 2021-07-05 | End: 2021-07-05 | Stop reason: HOSPADM

## 2021-07-05 RX ORDER — CEFAZOLIN SODIUM 1 G/3ML
INJECTION, POWDER, FOR SOLUTION INTRAMUSCULAR; INTRAVENOUS PRN
Status: DISCONTINUED | OUTPATIENT
Start: 2021-07-05 | End: 2021-07-05 | Stop reason: SURG

## 2021-07-05 RX ORDER — DIPHENHYDRAMINE HYDROCHLORIDE 50 MG/ML
12.5 INJECTION INTRAMUSCULAR; INTRAVENOUS
Status: DISCONTINUED | OUTPATIENT
Start: 2021-07-05 | End: 2021-07-05 | Stop reason: HOSPADM

## 2021-07-05 RX ORDER — HYDROMORPHONE HYDROCHLORIDE 1 MG/ML
0.5 INJECTION, SOLUTION INTRAMUSCULAR; INTRAVENOUS; SUBCUTANEOUS
Status: DISCONTINUED | OUTPATIENT
Start: 2021-07-05 | End: 2021-07-05 | Stop reason: HOSPADM

## 2021-07-05 RX ORDER — SODIUM CHLORIDE, SODIUM LACTATE, POTASSIUM CHLORIDE, CALCIUM CHLORIDE 600; 310; 30; 20 MG/100ML; MG/100ML; MG/100ML; MG/100ML
INJECTION, SOLUTION INTRAVENOUS CONTINUOUS
Status: ACTIVE | OUTPATIENT
Start: 2021-07-05 | End: 2021-07-05

## 2021-07-05 RX ORDER — ONDANSETRON 2 MG/ML
INJECTION INTRAMUSCULAR; INTRAVENOUS PRN
Status: DISCONTINUED | OUTPATIENT
Start: 2021-07-05 | End: 2021-07-05 | Stop reason: SURG

## 2021-07-05 RX ORDER — OXYBUTYNIN CHLORIDE 10 MG/1
10 TABLET, EXTENDED RELEASE ORAL DAILY
Qty: 10 TABLET | Refills: 0 | Status: SHIPPED | OUTPATIENT
Start: 2021-07-05 | End: 2021-10-19

## 2021-07-05 RX ORDER — OXYCODONE HCL 5 MG/5 ML
10 SOLUTION, ORAL ORAL
Status: COMPLETED | OUTPATIENT
Start: 2021-07-05 | End: 2021-07-05

## 2021-07-05 RX ORDER — HYDROMORPHONE HYDROCHLORIDE 1 MG/ML
0.4 INJECTION, SOLUTION INTRAMUSCULAR; INTRAVENOUS; SUBCUTANEOUS
Status: DISCONTINUED | OUTPATIENT
Start: 2021-07-05 | End: 2021-07-05 | Stop reason: HOSPADM

## 2021-07-05 RX ORDER — SODIUM CHLORIDE, SODIUM LACTATE, POTASSIUM CHLORIDE, CALCIUM CHLORIDE 600; 310; 30; 20 MG/100ML; MG/100ML; MG/100ML; MG/100ML
INJECTION, SOLUTION INTRAVENOUS CONTINUOUS
Status: DISCONTINUED | OUTPATIENT
Start: 2021-07-05 | End: 2021-07-05 | Stop reason: HOSPADM

## 2021-07-05 RX ORDER — MIDAZOLAM HYDROCHLORIDE 1 MG/ML
INJECTION INTRAMUSCULAR; INTRAVENOUS PRN
Status: DISCONTINUED | OUTPATIENT
Start: 2021-07-05 | End: 2021-07-05 | Stop reason: SURG

## 2021-07-05 RX ORDER — HYDRALAZINE HYDROCHLORIDE 20 MG/ML
5 INJECTION INTRAMUSCULAR; INTRAVENOUS
Status: DISCONTINUED | OUTPATIENT
Start: 2021-07-05 | End: 2021-07-05 | Stop reason: HOSPADM

## 2021-07-05 RX ORDER — HALOPERIDOL 5 MG/ML
1 INJECTION INTRAMUSCULAR
Status: DISCONTINUED | OUTPATIENT
Start: 2021-07-05 | End: 2021-07-05 | Stop reason: HOSPADM

## 2021-07-05 RX ORDER — ATROPA BELLADONNA AND OPIUM 16.2; 6 MG/1; MG/1
60 SUPPOSITORY RECTAL
Status: COMPLETED | OUTPATIENT
Start: 2021-07-05 | End: 2021-07-05

## 2021-07-05 RX ORDER — METOCLOPRAMIDE HYDROCHLORIDE 5 MG/ML
INJECTION INTRAMUSCULAR; INTRAVENOUS PRN
Status: DISCONTINUED | OUTPATIENT
Start: 2021-07-05 | End: 2021-07-05 | Stop reason: SURG

## 2021-07-05 RX ORDER — OXYCODONE HCL 5 MG/5 ML
5 SOLUTION, ORAL ORAL
Status: COMPLETED | OUTPATIENT
Start: 2021-07-05 | End: 2021-07-05

## 2021-07-05 RX ORDER — PHENAZOPYRIDINE HYDROCHLORIDE 100 MG/1
200 TABLET, FILM COATED ORAL
Status: COMPLETED | OUTPATIENT
Start: 2021-07-05 | End: 2021-07-05

## 2021-07-05 RX ADMIN — ONDANSETRON 4 MG: 2 INJECTION INTRAMUSCULAR; INTRAVENOUS at 11:09

## 2021-07-05 RX ADMIN — CEFAZOLIN 2 G: 330 INJECTION, POWDER, FOR SOLUTION INTRAMUSCULAR; INTRAVENOUS at 10:14

## 2021-07-05 RX ADMIN — OXYCODONE HYDROCHLORIDE 10 MG: 5 SOLUTION ORAL at 11:40

## 2021-07-05 RX ADMIN — SODIUM CHLORIDE, POTASSIUM CHLORIDE, SODIUM LACTATE AND CALCIUM CHLORIDE: 600; 310; 30; 20 INJECTION, SOLUTION INTRAVENOUS at 08:52

## 2021-07-05 RX ADMIN — METOCLOPRAMIDE 20 MG: 5 INJECTION, SOLUTION INTRAMUSCULAR; INTRAVENOUS at 10:14

## 2021-07-05 RX ADMIN — MIDAZOLAM HYDROCHLORIDE 2 MG: 1 INJECTION, SOLUTION INTRAMUSCULAR; INTRAVENOUS at 10:05

## 2021-07-05 RX ADMIN — ATROPA BELLADONNA AND OPIUM 60 MG: 16.2; 6 SUPPOSITORY RECTAL at 11:40

## 2021-07-05 RX ADMIN — FENTANYL CITRATE 50 MCG: 50 INJECTION, SOLUTION INTRAMUSCULAR; INTRAVENOUS at 10:12

## 2021-07-05 RX ADMIN — LIDOCAINE HYDROCHLORIDE 80 MG: 20 INJECTION, SOLUTION EPIDURAL; INFILTRATION; INTRACAUDAL at 10:12

## 2021-07-05 RX ADMIN — PROPOFOL 150 MG: 10 INJECTION, EMULSION INTRAVENOUS at 10:12

## 2021-07-05 RX ADMIN — PHENAZOPYRIDINE HYDROCHLORIDE 200 MG: 100 TABLET ORAL at 11:41

## 2021-07-05 RX ADMIN — POVIDONE IODINE 15 ML: 100 SOLUTION TOPICAL at 08:56

## 2021-07-05 RX ADMIN — FENTANYL CITRATE 50 MCG: 50 INJECTION, SOLUTION INTRAMUSCULAR; INTRAVENOUS at 10:40

## 2021-07-05 ASSESSMENT — PAIN DESCRIPTION - PAIN TYPE
TYPE: SURGICAL PAIN

## 2021-07-05 NOTE — OR SURGEON
Immediate Post OP Note    PreOp Diagnosis: B/L renal stones    PostOp Diagnosis: same    Procedure(s):  CYSTOSCOPY, WITH BILATERAL URETERAL STENT INSERTION - Wound Class: Clean Contaminated  URETEROSCOPY - Wound Class: Clean Contaminated  LITHOTRIPSY, USING LASER. - Wound Class: Clean Contaminated    Surgeon(s):  Italo Fair M.D.    Anesthesiologist/Type of Anesthesia:  Anesthesiologist: Liu Garcia M.D./General    Surgical Staff:  Circulator: Silvia Yeung R.N.  Laser Staff: Morteza Caldwell  Scrub Person: Terry Ray; Leandro Dennis    Specimens removed if any:  ID Type Source Tests Collected by Time Destination   A : KIDNEY STONE FRAGMENTS FOR ANALYSIS Stone Kidney PATHOLOGY SPECIMEN Italo Fair M.D. 7/5/2021 10:27 AM        Estimated Blood Loss: 10ml    Findings: B/L renal stones    Complications: none    Drains:  B/L [id number] ureteral stents    7/5/2021 11:14 AM Italo Fair M.D.

## 2021-07-05 NOTE — ANESTHESIA PROCEDURE NOTES
Airway    Date/Time: 7/5/2021 10:13 AM  Performed by: Liu Garcia M.D.  Authorized by: Liu Garcia M.D.     Location:  OR  Urgency:  Elective  Difficult Airway: No    Indications for Airway Management:  Anesthesia      Spontaneous Ventilation: absent    Sedation Level:  Deep  Preoxygenated: Yes    Mask Difficulty Assessment:  0 - not attempted  Final Airway Type:  Supraglottic airway  Final Supraglottic Airway:  Standard LMA    SGA Size:  4  Number of Attempts at Approach:  1

## 2021-07-05 NOTE — ANESTHESIA PREPROCEDURE EVALUATION
Relevant Problems   ANESTHESIA   (positive) Sleep apnea      CARDIAC   (positive) Hypertension      ENDO   (positive) Type 2 diabetes mellitus without complication, without long-term current use of insulin (HCC)      Other   (positive) Obesity (BMI 30-39.9)     Anes H&P:  PAST MEDICAL HISTORY:   66 y.o. female who presents for Procedure(s) (LRB):  CYSTOSCOPY, WITH URETERAL STENT INSERTION - POSSIBLE STENT (Bilateral)  URETEROSCOPY  LITHOTRIPSY, USING LASER..  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   • Bronchitis    • Essential hypertension, benign    • High cholesterol    • Hyperlipidemia    • Mixed hyperlipidemia    • Organic sleep apnea, unspecified    • Osteopenia    • Renal disorder 04/2019    kidney stones   • Sleep apnea    • Type II or unspecified type diabetes mellitus without mention of complication, not stated as uncontrolled     oral meds       SMOKING/ALCOHOL/RECREATIONAL DRUG USE:  Social History     Tobacco Use   • Smoking status: Never Smoker   • Smokeless tobacco: Never Used   Vaping Use   • Vaping Use: Never used   Substance Use Topics   • Alcohol use: No   • Drug use: No     Social History     Substance and Sexual Activity   Drug Use No       PAST SURGICAL HISTORY:  Past Surgical History:   Procedure Laterality Date   • CYSTOSCOPY N/A 7/5/2019    Procedure: CYSTOSCOPY;  Surgeon: Espinoza Orr M.D.;  Location: [facility];  Service: Urology   • URETEROSCOPY Right 7/5/2019    Procedure: URETEROSCOPY;  Surgeon: Espinoza Orr M.D.;  Location: [facility];  Service: Urology   • LASERTRIPSY Right 7/5/2019    Procedure: LITHOTRIPSY, USING LASER;  Surgeon: Espinoza Orr M.D.;  Location: [facility];  Service: Urology   • STENT PLACEMENT Right 7/5/2019    Procedure: STENT PLACEMENT;  Surgeon: Espinoza Orr  M.D.;  Location: SURGERY [facility];  Service: Urology   • OTHER  07/2019    lithotripsy   • COLONOSCOPY WITH POLYP  October 2007    tubular adenoma   • OTHER ORTHOPEDIC SURGERY N/A 04/1998    laminectomy   • EMBOLECTOMY  1998    remove blood clot from spinal cord       ALLERGIES:   Allergies   Allergen Reactions   • Amoxicillin      Diarrhea     • Calcium      Constipation       MEDICATIONS:  No current facility-administered medications on file prior to encounter.     Current Outpatient Medications on File Prior to Encounter   Medication Sig Dispense Refill   • Telmisartan-amLODIPine 80-5 MG Tab Take  by mouth.     • rosuvastatin (CRESTOR) 20 MG Tab TAKE 1 TABLET BY MOUTH DAILY     • glimepiride (AMARYL) 4 MG TABS Take 1 Tab by mouth 2 times a day. 180 Tab 3   • metformin (GLUCOPHAGE) 500 MG TABS Take 2 Tabs by mouth 2 times a day, with meals. 360 Tab 3   • aspirin 81 MG tablet Take 81 mg by mouth every day.         LABS:  Lab Results   Component Value Date/Time    HEMOGLOBIN 13.3 07/05/2019 1225    HEMATOCRIT 42.3 07/05/2019 1225    WBC 7.3 07/05/2019 1225     Lab Results   Component Value Date/Time    SODIUM 143 06/28/2021 0702    POTASSIUM 3.7 06/28/2021 0702    CHLORIDE 106 06/28/2021 0702    CO2 22 06/28/2021 0702    GLUCOSE 213 (H) 06/28/2021 0702    BUN 10 06/28/2021 0702    CALCIUM 9.0 06/28/2021 0702       COVID-19 Status: Vaccinated      PREVIOUS ANESTHETICS: See EMR  __________________________________________      Physical Exam    Airway   Mallampati: II  TM distance: >3 FB  Neck ROM: full       Cardiovascular - normal exam  Rhythm: regular  Rate: normal  (-) murmur     Dental - normal exam      Very poor dentition   Pulmonary - normal exam  Breath sounds clear to auscultation     Abdominal   (+) obese     Neurological - normal exam                 Anesthesia Plan    ASA 3- EMERGENT   ASA physical status 3 criteria: diabetes - poorly controlled and hypertension - poorly controlledASA physical status  emergent criteria: compromised vital organ, limb or tissue    Plan - general       Airway plan will be LMA          Induction: intravenous    Postoperative Plan: Postoperative administration of opioids is intended.    Pertinent diagnostic labs and testing reviewed    Informed Consent:    Anesthetic plan and risks discussed with patient.    Use of blood products discussed with: patient whom consented to blood products.

## 2021-07-05 NOTE — OR NURSING
1219 - Received pt from Recovery. No complaints of pain or N/V at this time. VSS. Pt already voided in PACU    1250 - Pt ambulated to bathroom and voided adequately    1315 - Discharge orders received. IV taken out. All belongings returned to pt. Pt changed into clothing with assistance. Pt up and ambulated to BR and voided adequately. Discharge instructions given and discussed as well as pain management handout. Pt verbalizes understandings and all questions answered at this time. Pt states they are ready to be D/C home. Prescriptions sent to pharmacy and verbalizes understanding of medications. Pt D/C via wheelchair with all belongings with RN

## 2021-07-05 NOTE — OP REPORT
DATE OF SERVICE:  07/05/2021     PREOPERATIVE DIAGNOSIS:  Bilateral renal stones.     POSTOPERATIVE DIAGNOSIS:  Bilateral renal stones.     PROCEDURES PERFORMED:  Cystoscopy, bilateral ureteroscopy with laser   lithotripsy and bilateral ureteral stent placement.     SURGEON:  Italo Fair MD     ANESTHESIOLOGIST:  Liu Garcia MD     ANESTHESIA:  General.     INDICATIONS FOR PROCEDURE:  The patient is a 66-year-old female with a history   of stones. Recent imaging showing bilateral renal stones, largest 6-7 mm on   both sides.  After discussing treatment options, she has elected for bilateral   ureteroscopy with laser lithotripsy and stent placement.     DESCRIPTION OF PROCEDURE:  After obtaining informed consent, the patient was   brought to the operating room.  After the induction of general anesthesia, she   was placed in dorsal lithotomy position and her genitalia prepped and draped   in sterile fashion.  She received Ancef as antibiotic prophylaxis prior to   starting the procedure.  A 22-Thai cystoscope was passed per urethra into   the bladder under direct vision.  The bladder was surveyed in its entirety and   was normal in appearance.  Two sensor wires were passed up the left ureter   under fluoroscopic guidance after which an 11/13-Thai ureteral access sheath   was passed up into the proximal ureter over the second Sensor wire.  A   flexible ureteroscope was then passed up into the renal collecting system.    There were many stones seen on this side, all very tiny stones.  There were a   couple of larger 5-6 mm stones that were lasered down using a 200 micron   holmium laser fiber and the fragments basketed out.  There were many, many   other stones possibly up to 50-60 tiny stones that were all too small to   basket out.  I tried to irrigate as many of them as I could, many of them were   just floating around inside the kidney, but I did remove the larger stone   fragments.  I then  performed a retrograde pyelogram through the ureteroscope   and then passed up a 6-Russian x 24 cm stent by backloading through the   cystoscope until it was seen to coil in the renal pelvis on fluoroscopy as   well as in the bladder under direct vision.  I then passed 2 sensor wires up   the right ureter under fluoroscopic guidance and passed the ureteral access   sheath up over the second sensor wire into the proximal ureter.  A flexible   ureteroscope was passed up into the renal collecting system.  There was a 6 mm   stone in the upper pole tucker that was lasered down to smaller fragments and   basketed out and there was about a 7-8 mm stone in the mid pole tucker that was   also lasered down into smaller fragments and basketed out.  There were a few   smaller fragments that were too small to basket out as well on this side, but   not nearly as many of the other side.  I then performed a retrograde pyelogram   on the right side and then removed the ureteroscope and passed up a 6-Russian   x 24 cm stent on the right side until it was seen to coil in the renal pelvis   on fluoroscopy as well as in the bladder under direct vision.  The bladder was   then emptied through the cystoscope, which was then removed.  She was then   awoken from anesthesia and taken to recovery room in stable condition.     COMPLICATIONS:  None.     ESTIMATED BLOOD LOSS:  10 mL.     SPECIMENS:  Stone fragments.     DRAINS:  A 6-Russian x 24 cm stents on both sides.        ______________________________  MD FRANK Land/LATA    DD:  07/05/2021 11:22  DT:  07/05/2021 12:07    Job#:  [id number]

## 2021-07-05 NOTE — ANESTHESIA POSTPROCEDURE EVALUATION
Patient: Zena Hui    Procedure Summary     Date: 07/05/21 Room / Location: [place] [id number] / SURGERY [facility]    Anesthesia Start: 1001 Anesthesia Stop: 1125    Procedures:       CYSTOSCOPY, WITH URETERAL STENT INSERTION - POSSIBLE STENT (Bilateral Ureter)      URETEROSCOPY (Bilateral Ureter)      LITHOTRIPSY, USING LASER. (Bilateral Ureter) Diagnosis: (KIDNEY STONE, BILATERAL)    Surgeons: Italo Fair M.D. Responsible Provider: Liu Garcia M.D.    Anesthesia Type: general ASA Status: 3 - Emergent          Final Anesthesia Type: general  Last vitals  BP   Blood Pressure : 155/66    Temp   36.3 °C (97.4 °F)    Pulse   (!) 57   Resp   14    SpO2   96 %      Anesthesia Post Evaluation    Patient location during evaluation: PACU  Patient participation: complete - patient participated  Level of consciousness: sleepy but conscious    Airway patency: patent  Anesthetic complications: no  Cardiovascular status: hemodynamically stable  Respiratory status: acceptable  Hydration status: balanced    PONV: none          No complications documented.     Nurse Pain Score: 3 (NPRS)

## 2021-07-05 NOTE — DISCHARGE INSTRUCTIONS
ACTIVITY: Rest and take it easy for the first 24 hours.  A responsible adult is recommended to remain with you during that time.  It is normal to feel sleepy.  We encourage you to not do anything that requires balance, judgment or coordination.    MILD FLU-LIKE SYMPTOMS ARE NORMAL. YOU MAY EXPERIENCE GENERALIZED MUSCLE ACHES, THROAT IRRITATION, HEADACHE AND/OR SOME NAUSEA.    FOR 24 HOURS DO NOT:  Drive, operate machinery or run household appliances.  Drink beer or alcoholic beverages.   Make important decisions or sign legal documents.    SPECIAL INSTRUCTIONS: Call if fever >101F, pain not controlled with medications, or if unable to urinate.    DIET: To avoid nausea, slowly advance diet as tolerated, avoiding spicy or greasy foods for the first day.  Add more substantial food to your diet according to your physician's instructions.  Babies can be fed formula or breast milk as soon as they are hungry.  INCREASE FLUIDS AND FIBER TO AVOID CONSTIPATION.    SURGICAL DRESSING/BATHING: May shower starting tomorrow    FOLLOW-UP APPOINTMENT:  A follow-up appointment should be arranged with your doctor in 1-2 weeks; call to schedule.    You should CALL YOUR PHYSICIAN if you develop:  Fever greater than 101 degrees F.  Pain not relieved by medication, or persistent nausea or vomiting.  Excessive bleeding (blood soaking through dressing) or unexpected drainage from the wound.  Extreme redness or swelling around the incision site, drainage of pus or foul smelling drainage.  Inability to urinate or empty your bladder within 8 hours.  Problems with breathing or chest pain.    You should call 911 if you develop problems with breathing or chest pain.  If you are unable to contact your doctor or surgical center, you should go to the nearest emergency room or urgent care center.  Physician's telephone #: [phone] Dr. Fair    If any questions arise, call your doctor.  If your doctor is not available, please feel free to call  the Surgical Center at [phone]. The Contact Center is open Monday through Friday 7AM to 5PM and may speak to a nurse at [phone], or toll free at [phone].     A registered nurse may call you a few days after your surgery to see how you are doing after your procedure.    MEDICATIONS: Resume taking daily medication.  Take prescribed pain medication with food.  If no medication is prescribed, you may take non-aspirin pain medication if needed.  PAIN MEDICATION CAN BE VERY CONSTIPATING.  Take a stool softener or laxative such as senokot, pericolace, or milk of magnesia if needed.    Prescription given for Toradol (pain), oxybutynin, Pyridium .  Last pain medication given at 11:40 am 10mg of oxycodone, 1140am 200mg of pyridium given.    If your physician has prescribed pain medication that includes Acetaminophen (Tylenol), do not take additional Acetaminophen (Tylenol) while taking the prescribed medication.    Depression / Suicide Risk    As you are discharged from this [facility] facility, it is important to learn how to keep safe from harming yourself.    Recognize the warning signs:  · Abrupt changes in personality, positive or negative- including increase in energy   · Giving away possessions  · Change in eating patterns- significant weight changes-  positive or negative  · Change in sleeping patterns- unable to sleep or sleeping all the time   · Unwillingness or inability to communicate  · Depression  · Unusual sadness, discouragement and loneliness  · Talk of wanting to die  · Neglect of personal appearance   · Rebelliousness- reckless behavior  · Withdrawal from people/activities they love  · Confusion- inability to concentrate     If you or a loved one observes any of these behaviors or has concerns about self-harm, here's what you can do:  · Talk about it- your feelings and reasons for harming yourself  · Remove any means that you might use to hurt yourself (examples: pills, rope, extension  cords, firearm)  · Get professional help from the community (Mental Health, Substance Abuse, psychological counseling)  · Do not be alone:Call your Safe Contact- someone whom you trust who will be there for you.  · Call your local CRISIS HOTLINE 809-2441 or 496-597-8152  · Call your local Children's Mobile Crisis Response Team [place] (761) 811-9424 or www.CHIC.TV  · Call the toll free National Suicide Prevention Hotlines   · National Suicide Prevention Lifeline 513-200-ORVK (7839)  · National Hope Line Network 800-SUICIDE (652-9888)

## 2021-07-05 NOTE — OR NURSING
"Pt. to stage 2.AAOX4.Pt. verbalized\"manageable\"pain level.Voided QS amount of urine via bedpan.  Pt's.  updated.  "

## 2021-07-10 LAB
APPEARANCE STONE: NORMAL
COMPN STONE: NORMAL
NUMBER STONE: NORMAL
SIZE STONE: NORMAL MM
SPECIMEN WT: 302 MG

## 2021-07-20 ENCOUNTER — TELEPHONE (OUTPATIENT)
Dept: CARDIOLOGY | Facility: MEDICAL CENTER | Age: 66
End: 2021-07-20

## 2021-07-20 NOTE — LETTER
PROCEDURE/SURGERY CLEARANCE FORM      Encounter Date: 7/21/2021    Patient: Zena Hui  YOB: 1955    CARDIOLOGIST:  Jaswant Ridley MD   REFERRING DOCTOR:  David Christopher MD        The above patient is low cardiovascular risk to have the following procedure/surgery: Blepharoplasty                                           Additional comments: Ok to hold aspirin 14 days prior to surgery and 3 days post           Jaswant Ridley MD    Electronically Signed

## 2021-07-20 NOTE — TELEPHONE ENCOUNTER
Received clearance request from [facility] for pt's bilateral upper lid blepharoplasty on 7/28/21. They would also like pt to hold aspirin 14 days prior and 3 days after surgery.

## 2021-07-21 NOTE — TELEPHONE ENCOUNTER
You  You; SHANIQUA Valle 23 hours ago (2:19 PM)     To TW care team:   MEKA Lambert mentions in his last note that pt is low risk for surgery, but there wasn't mention of aspirin. Ok to hold aspirin 14 days prior and 3 days after surgery?   Thank you!      KJ Valle.  You 54 minutes ago (1:22 PM)     Okay to hold.   JS         Letter drafted and faxed to [phone]. Receipt confirmed.

## 2021-07-26 ENCOUNTER — HOSPITAL ENCOUNTER (OUTPATIENT)
Dept: RADIOLOGY | Facility: MEDICAL CENTER | Age: 66
End: 2021-07-26
Attending: INTERNAL MEDICINE
Payer: COMMERCIAL

## 2021-07-26 DIAGNOSIS — R94.31 NONSPECIFIC ABNORMAL ELECTROCARDIOGRAM (ECG) (EKG): ICD-10-CM

## 2021-07-26 PROCEDURE — 93018 CV STRESS TEST I&R ONLY: CPT | Performed by: INTERNAL MEDICINE

## 2021-07-26 PROCEDURE — 78452 HT MUSCLE IMAGE SPECT MULT: CPT | Mod: 26 | Performed by: INTERNAL MEDICINE

## 2021-07-26 PROCEDURE — A9502 TC99M TETROFOSMIN: HCPCS

## 2021-07-26 RX ORDER — REGADENOSON 0.08 MG/ML
INJECTION, SOLUTION INTRAVENOUS
Status: COMPLETED
Start: 2021-07-26 | End: 2021-07-26

## 2021-07-29 ENCOUNTER — TELEPHONE (OUTPATIENT)
Dept: CARDIOLOGY | Facility: MEDICAL CENTER | Age: 66
End: 2021-07-29

## 2021-07-29 NOTE — TELEPHONE ENCOUNTER
Myocardial Perfusion   Report   NUCLEAR IMAGING INTERPRETATION   No evidence of significant jeopardized viable myocardium or prior myocardial    infarction.   LV ejection fraction = 60%.   Inferior defect improved with stress most consistent with artifact although    prior subendocardial MI cannot be definitively excluded.   ECG INTERPRETATION   Negative stress ECG for ischemia.    Please advise.

## 2021-09-22 ENCOUNTER — TELEPHONE (OUTPATIENT)
Dept: CARDIOLOGY | Facility: MEDICAL CENTER | Age: 66
End: 2021-09-22

## 2021-09-22 NOTE — TELEPHONE ENCOUNTER
Called patient. She did not do echo yet. Canceled follow up with Dr. Ridley on 9/29/2021.She will schedule that and call back to schedule with Dr. Ridley.

## 2021-09-30 ENCOUNTER — HOSPITAL ENCOUNTER (OUTPATIENT)
Dept: RADIOLOGY | Facility: MEDICAL CENTER | Age: 66
End: 2021-09-30
Attending: FAMILY MEDICINE
Payer: MEDICARE

## 2021-09-30 ENCOUNTER — HOSPITAL ENCOUNTER (OUTPATIENT)
Dept: RADIOLOGY | Facility: MEDICAL CENTER | Age: 66
End: 2021-09-30
Payer: MEDICARE

## 2021-10-01 ENCOUNTER — HOSPITAL ENCOUNTER (OUTPATIENT)
Dept: CARDIOLOGY | Facility: MEDICAL CENTER | Age: 66
End: 2021-10-01
Attending: INTERNAL MEDICINE
Payer: MEDICARE

## 2021-10-01 DIAGNOSIS — R94.31 NONSPECIFIC ABNORMAL ELECTROCARDIOGRAM (ECG) (EKG): ICD-10-CM

## 2021-10-01 PROCEDURE — 93306 TTE W/DOPPLER COMPLETE: CPT

## 2021-10-03 LAB
LV EJECT FRACT  99904: 55
LV EJECT FRACT MOD 2C 99903: 50.32
LV EJECT FRACT MOD 4C 99902: 64.26
LV EJECT FRACT MOD BP 99901: 57.5

## 2021-10-03 PROCEDURE — 93306 TTE W/DOPPLER COMPLETE: CPT | Mod: 26 | Performed by: INTERNAL MEDICINE

## 2021-10-13 NOTE — PROGRESS NOTES
Chief Complaint   Patient presents with   • Hypertension       Subjective     Ledy Hui is a 66 y.o. female patient of Dr. Ridley who presents today for follow up.    Patient was seen for initial consultation by Dr. Ridley on 6/28/21 due to an abnormal EKG. An echocardiogram and TM stress test was ordered.     PMH pertinent for T2DM, HTN, HLD, JASPER and obesity.     Today patient states that she is overall feeling well. Denies having any significant concerns or complaints from a cardiac standpoint. Primary concern is getting better control over her weight and A1C. Her PCP recently started her on Trulicity.     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   • Bronchitis    • Essential hypertension, benign    • High cholesterol    • Hyperlipidemia    • Mixed hyperlipidemia    • Organic sleep apnea, unspecified    • Osteopenia    • Renal disorder 04/2019    kidney stones   • Sleep apnea    • Type II or unspecified type diabetes mellitus without mention of complication, not stated as uncontrolled     oral meds     Past Surgical History:   Procedure Laterality Date   • PB CYSTOSCOPY,INSERT URETERAL STENT Bilateral 7/5/2021    Procedure: CYSTOSCOPY, WITH URETERAL STENT INSERTION - POSSIBLE STENT;  Surgeon: Italo Fair M.D.;  Location: [facility];  Service: Urology   • PB CYSTO/URETERO/PYELOSCOPY, DX Bilateral 7/5/2021    Procedure: URETEROSCOPY;  Surgeon: Italo Fair M.D.;  Location: [facility];  Service: Urology   • LASERTRIPSY Bilateral 7/5/2021    Procedure: LITHOTRIPSY, USING LASER.;  Surgeon: Italo Fair M.D.;  Location: [facility];  Service: Urology   • CYSTOSCOPY N/A 7/5/2019    Procedure: CYSTOSCOPY;  Surgeon: Espinoza Orr M.D.;  Location: [facility];  Service: Urology   • URETEROSCOPY Right 7/5/2019    Procedure: URETEROSCOPY;  Surgeon: Espinoza Orr M.D.;   Location: SUR[facility];  Service: Urology   • LASERTRIPSY Right 7/5/2019    Procedure: LITHOTRIPSY, USING LASER;  Surgeon: Espinoza Orr M.D.;  Location: SURGERY [facility];  Service: Urology   • STENT PLACEMENT Right 7/5/2019    Procedure: STENT PLACEMENT;  Surgeon: Espinoza Orr M.D.;  Location: SURGERY [facility];  Service: Urology   • OTHER  07/2019    lithotripsy   • COLONOSCOPY WITH POLYP  October 2007    tubular adenoma   • OTHER ORTHOPEDIC SURGERY N/A 04/1998    laminectomy   • EMBOLECTOMY  1998    remove blood clot from spinal cord     Family History   Problem Relation Age of Onset   • Arthritis Mother    • Lung Disease Mother         long term smoker   • Heart Disease Father    • Heart Disease Brother         CAD   S/P CABG     Social History     Socioeconomic History   • Marital status:      Spouse name: Not on file   • Number of children: Not on file   • Years of education: Not on file   • Highest education level: Not on file   Occupational History   • Not on file   Tobacco Use   • Smoking status: Never Smoker   • Smokeless tobacco: Never Used   Vaping Use   • Vaping Use: Never used   Substance and Sexual Activity   • Alcohol use: No   • Drug use: No   • Sexual activity: Yes     Partners: Male   Other Topics Concern   • Not on file   Social History Narrative   • Not on file     Social Determinants of Health     Financial Resource Strain:    • Difficulty of Paying Living Expenses:    Food Insecurity:    • Worried About Running Out of Food in the Last Year:    • Ran Out of Food in the Last Year:    Transportation Needs:    • Lack of Transportation (Medical):    • Lack of Transportation (Non-Medical):    Physical Activity:    • Days of Exercise per Week:    • Minutes of Exercise per Session:    Stress:    • Feeling of Stress :    Social Connections:    • Frequency of Communication with Friends and Family:    • Frequency of Social Gatherings with Friends and Family:    •  Attends Holiness Services:    • Active Member of Clubs or Organizations:    • Attends Club or Organization Meetings:    • Marital Status:    Intimate Partner Violence:    • Fear of Current or Ex-Partner:    • Emotionally Abused:    • Physically Abused:    • Sexually Abused:      Allergies   Allergen Reactions   • Amoxicillin      Diarrhea     • Calcium      Constipation     Outpatient Encounter Medications as of 10/19/2021   Medication Sig Dispense Refill   • fluconazole (DIFLUCAN) 150 MG tablet      • erythromycin 5 MG/GM Ointment APPLY TO INCISION SITES OF BOTH UPPER EYELIDS 3 TIMES A DAY X1 WEEK THEN AS DIRECTED     • ALPRAZolam (XANAX) 0.5 MG Tab 1/2 TO 1 TAB BY MOUTH 1 HR PRIOR TO FLIGHT     • Dulaglutide (TRULICITY) 3 MG/0.5ML Solution Pen-injector Inject  under the skin.     • Telmisartan-amLODIPine 80-5 MG Tab Take  by mouth.     • rosuvastatin (CRESTOR) 20 MG Tab TAKE 1 TABLET BY MOUTH DAILY     • glimepiride (AMARYL) 4 MG TABS Take 1 Tab by mouth 2 times a day. 180 Tab 3   • metformin (GLUCOPHAGE) 500 MG TABS Take 2 Tabs by mouth 2 times a day, with meals. 360 Tab 3   • aspirin 81 MG tablet Take 81 mg by mouth every day.     • [DISCONTINUED] ketorolac (TORADOL) 10 MG Tab Take 1 tablet by mouth every four hours as needed for Moderate Pain. 9 tablet 0   • [DISCONTINUED] phenazopyridine (PYRIDIUM) 200 MG Tab Take 1 tablet by mouth 3 times a day as needed (burning with urination). 12 tablet 0   • [DISCONTINUED] oxybutynin SR (DITROPAN-XL) 10 MG CR tablet Take 1 tablet by mouth every day. 10 tablet 0     No facility-administered encounter medications on file as of 10/19/2021.     Review of Systems   Constitutional: Negative for malaise/fatigue and weight loss.   Respiratory: Negative for shortness of breath.    Cardiovascular: Negative for chest pain, palpitations, orthopnea, claudication, leg swelling and PND.   Neurological: Negative for dizziness and weakness.   All other systems reviewed and are  "negative.         Objective     /70 (BP Location: Left arm, Patient Position: Sitting, BP Cuff Size: Adult)   Pulse 68   Resp 16   Ht 1.6 m (5' 3\")   Wt 93.4 kg (206 lb)   LMP 01/01/2009   SpO2 96%   BMI 36.49 kg/m²     Physical Exam  Constitutional:       General: She is not in acute distress.     Appearance: She is well-developed.   HENT:      Head: Normocephalic.   Eyes:      Extraocular Movements: Extraocular movements intact.   Neck:      Vascular: No carotid bruit or JVD.   Cardiovascular:      Rate and Rhythm: Normal rate and regular rhythm.      Heart sounds: Normal heart sounds.   Pulmonary:      Effort: No respiratory distress.      Breath sounds: Normal breath sounds.   Abdominal:      Palpations: Abdomen is soft.      Tenderness: There is no abdominal tenderness.   Musculoskeletal:         General: Normal range of motion.      Cervical back: Normal range of motion.      Right lower leg: No edema.      Left lower leg: No edema.   Skin:     General: Skin is warm and dry.   Neurological:      Mental Status: She is alert and oriented to person, place, and time.   Psychiatric:         Mood and Affect: Mood normal.         Behavior: Behavior normal.         Thought Content: Thought content normal.              TTE 10/3/2021:  CONCLUSIONS  No prior study is available for comparison.   The left ventricle is normal in size.  Normal regional wall motion.  The left ventricular ejection fraction is visually estimated to be 55%.  No significant valve abnormalities.   Normal inferior vena cava size and inspiratory collapse.    NM Cardiac Stress Test 7/26/2021:   Report   NUCLEAR IMAGING INTERPRETATION   No evidence of significant jeopardized viable myocardium or prior myocardial    infarction.   LV ejection fraction = 60%.   Inferior defect improved with stress most consistent with artifact although    prior subendocardial MI cannot be definitively excluded.   ECG INTERPRETATION   Negative stress ECG for " ischemia.    Assessment & Plan     1. Nonspecific abnormal electrocardiogram (ECG) (EKG)     2. Essential hypertension     3. Elevated cholesterol     4. Obesity (BMI 30-39.9)     5. Type 2 diabetes mellitus without complication, without long-term current use of insulin (HCC)         Medical Decision Making: Today's Assessment/Status/Plan:       HLD:  - LDL well controlled (63).  - Continue Crestor 20 mg daily.     LAFB:  - Low risk MPI in July.  - TTE in October showed preserved LV function with no WMA.    HTN:  - Remains well controlled.  - Continue Telmisartan-Amlodipine 80-5 mg daily.     DM:  - Agree with initiation of Trulicity.     Heart healthy diet and increased exercise discussed with patient.    Patient will follow up with Dr. Jaswant Ridley in 1 year or earlier if needed.  Encouraged patient to contact our office should any questions or concerns arise meantime.  Patient understands agrees plan of care.

## 2021-10-14 ENCOUNTER — TELEPHONE (OUTPATIENT)
Dept: HEALTH INFORMATION MANAGEMENT | Facility: OTHER | Age: 66
End: 2021-10-14

## 2021-10-14 ENCOUNTER — HOSPITAL ENCOUNTER (OUTPATIENT)
Dept: LAB | Facility: MEDICAL CENTER | Age: 66
End: 2021-10-14
Attending: FAMILY MEDICINE
Payer: MEDICARE

## 2021-10-14 LAB
ALBUMIN SERPL BCP-MCNC: 4.4 G/DL (ref 3.2–4.9)
ALBUMIN/GLOB SERPL: 1.7 G/DL
ALP SERPL-CCNC: 142 U/L (ref 30–99)
ALT SERPL-CCNC: 19 U/L (ref 2–50)
ANION GAP SERPL CALC-SCNC: 11 MMOL/L (ref 7–16)
AST SERPL-CCNC: 14 U/L (ref 12–45)
BILIRUB SERPL-MCNC: 0.6 MG/DL (ref 0.1–1.5)
BUN SERPL-MCNC: 12 MG/DL (ref 8–22)
CALCIUM SERPL-MCNC: 9.3 MG/DL (ref 8.5–10.5)
CHLORIDE SERPL-SCNC: 103 MMOL/L (ref 96–112)
CO2 SERPL-SCNC: 27 MMOL/L (ref 20–33)
CREAT SERPL-MCNC: 0.5 MG/DL (ref 0.5–1.4)
EST. AVERAGE GLUCOSE BLD GHB EST-MCNC: 252 MG/DL
FASTING STATUS PATIENT QL REPORTED: NORMAL
GLOBULIN SER CALC-MCNC: 2.6 G/DL (ref 1.9–3.5)
GLUCOSE SERPL-MCNC: 221 MG/DL (ref 65–99)
HBA1C MFR BLD: 10.4 % (ref 4–5.6)
POTASSIUM SERPL-SCNC: 3.9 MMOL/L (ref 3.6–5.5)
PROT SERPL-MCNC: 7 G/DL (ref 6–8.2)
SODIUM SERPL-SCNC: 141 MMOL/L (ref 135–145)

## 2021-10-14 PROCEDURE — 80053 COMPREHEN METABOLIC PANEL: CPT

## 2021-10-14 PROCEDURE — 36415 COLL VENOUS BLD VENIPUNCTURE: CPT

## 2021-10-14 PROCEDURE — 83036 HEMOGLOBIN GLYCOSYLATED A1C: CPT

## 2021-10-14 NOTE — TELEPHONE ENCOUNTER
Outcome: Left Message  to schedule Comprehensive Health Assessment.      Please transfer to Patient Outreach Team at [phone] when patient returns call.    [facility] Verified: yes    Attempt # 1

## 2021-10-18 NOTE — TELEPHONE ENCOUNTER
Outcome: SCHEDULED RIYA   Please transfer to Patient Outreach Team at [phone] when patient returns call.        Attempt #1

## 2021-10-19 ENCOUNTER — OFFICE VISIT (OUTPATIENT)
Dept: CARDIOLOGY | Facility: MEDICAL CENTER | Age: 66
End: 2021-10-19
Payer: MEDICARE

## 2021-10-19 VITALS
OXYGEN SATURATION: 96 % | RESPIRATION RATE: 16 BRPM | SYSTOLIC BLOOD PRESSURE: 138 MMHG | WEIGHT: 206 LBS | BODY MASS INDEX: 36.5 KG/M2 | HEIGHT: 63 IN | DIASTOLIC BLOOD PRESSURE: 70 MMHG | HEART RATE: 68 BPM

## 2021-10-19 DIAGNOSIS — I10 ESSENTIAL HYPERTENSION: ICD-10-CM

## 2021-10-19 DIAGNOSIS — R94.31 NONSPECIFIC ABNORMAL ELECTROCARDIOGRAM (ECG) (EKG): ICD-10-CM

## 2021-10-19 DIAGNOSIS — E78.00 ELEVATED CHOLESTEROL: ICD-10-CM

## 2021-10-19 DIAGNOSIS — E66.9 OBESITY (BMI 30-39.9): ICD-10-CM

## 2021-10-19 DIAGNOSIS — E11.9 TYPE 2 DIABETES MELLITUS WITHOUT COMPLICATION, WITHOUT LONG-TERM CURRENT USE OF INSULIN (HCC): ICD-10-CM

## 2021-10-19 PROCEDURE — 99214 OFFICE O/P EST MOD 30 MIN: CPT | Performed by: NURSE PRACTITIONER

## 2021-10-19 RX ORDER — FLUCONAZOLE 150 MG/1
TABLET ORAL
COMMUNITY
Start: 2021-09-22 | End: 2022-05-09

## 2021-10-19 RX ORDER — ERYTHROMYCIN 5 MG/G
OINTMENT OPHTHALMIC
COMMUNITY
Start: 2021-07-28 | End: 2022-05-09

## 2021-10-19 RX ORDER — ALPRAZOLAM 0.5 MG/1
TABLET ORAL
COMMUNITY
Start: 2021-08-09 | End: 2022-05-09

## 2021-10-19 ASSESSMENT — ENCOUNTER SYMPTOMS
ORTHOPNEA: 0
WEIGHT LOSS: 0
PALPITATIONS: 0
SHORTNESS OF BREATH: 0
CLAUDICATION: 0
WEAKNESS: 0
PND: 0
DIZZINESS: 0

## 2021-11-03 PROBLEM — E66.01 MORBID OBESITY (HCC): Status: ACTIVE | Noted: 2021-11-03

## 2021-11-03 PROBLEM — F13.20 SEDATIVE, HYPNOTIC, OR ANXIOLYTIC DEPENDENCE (HCC): Status: ACTIVE | Noted: 2021-11-03

## 2021-11-04 ENCOUNTER — HOSPITAL ENCOUNTER (OUTPATIENT)
Dept: RADIOLOGY | Facility: MEDICAL CENTER | Age: 66
End: 2021-11-04
Attending: FAMILY MEDICINE
Payer: MEDICARE

## 2021-11-04 DIAGNOSIS — Z12.31 VISIT FOR SCREENING MAMMOGRAM: ICD-10-CM

## 2021-11-04 PROCEDURE — 77063 BREAST TOMOSYNTHESIS BI: CPT

## 2022-01-11 ENCOUNTER — APPOINTMENT (OUTPATIENT)
Dept: RADIOLOGY | Facility: MEDICAL CENTER | Age: 67
End: 2022-01-11
Attending: FAMILY MEDICINE
Payer: MEDICARE

## 2022-01-27 ENCOUNTER — HOSPITAL ENCOUNTER (OUTPATIENT)
Dept: LAB | Facility: MEDICAL CENTER | Age: 67
End: 2022-01-27
Attending: FAMILY MEDICINE
Payer: MEDICARE

## 2022-01-27 LAB
ALBUMIN SERPL BCP-MCNC: 4.4 G/DL (ref 3.2–4.9)
ALBUMIN/GLOB SERPL: 1.7 G/DL
ALP SERPL-CCNC: 134 U/L (ref 30–99)
ALT SERPL-CCNC: 11 U/L (ref 2–50)
ANION GAP SERPL CALC-SCNC: 13 MMOL/L (ref 7–16)
AST SERPL-CCNC: 8 U/L (ref 12–45)
BILIRUB SERPL-MCNC: 0.5 MG/DL (ref 0.1–1.5)
BUN SERPL-MCNC: 15 MG/DL (ref 8–22)
CALCIUM SERPL-MCNC: 9 MG/DL (ref 8.5–10.5)
CHLORIDE SERPL-SCNC: 102 MMOL/L (ref 96–112)
CHOLEST SERPL-MCNC: 148 MG/DL (ref 100–199)
CO2 SERPL-SCNC: 23 MMOL/L (ref 20–33)
CREAT SERPL-MCNC: 0.61 MG/DL (ref 0.5–1.4)
CREAT UR-MCNC: 89.05 MG/DL
EST. AVERAGE GLUCOSE BLD GHB EST-MCNC: 255 MG/DL
FASTING STATUS PATIENT QL REPORTED: NORMAL
GLOBULIN SER CALC-MCNC: 2.6 G/DL (ref 1.9–3.5)
GLUCOSE SERPL-MCNC: 267 MG/DL (ref 65–99)
HBA1C MFR BLD: 10.5 % (ref 4–5.6)
HDLC SERPL-MCNC: 55 MG/DL
LDLC SERPL CALC-MCNC: 70 MG/DL
MICROALBUMIN UR-MCNC: 40.8 MG/DL
MICROALBUMIN/CREAT UR: 458 MG/G (ref 0–30)
POTASSIUM SERPL-SCNC: 3.8 MMOL/L (ref 3.6–5.5)
PROT SERPL-MCNC: 7 G/DL (ref 6–8.2)
SODIUM SERPL-SCNC: 138 MMOL/L (ref 135–145)
TRIGL SERPL-MCNC: 113 MG/DL (ref 0–149)

## 2022-01-27 PROCEDURE — 83036 HEMOGLOBIN GLYCOSYLATED A1C: CPT

## 2022-01-27 PROCEDURE — 80061 LIPID PANEL: CPT

## 2022-01-27 PROCEDURE — 80053 COMPREHEN METABOLIC PANEL: CPT

## 2022-01-27 PROCEDURE — 82570 ASSAY OF URINE CREATININE: CPT

## 2022-01-27 PROCEDURE — 36415 COLL VENOUS BLD VENIPUNCTURE: CPT

## 2022-01-27 PROCEDURE — 82043 UR ALBUMIN QUANTITATIVE: CPT

## 2022-02-03 ENCOUNTER — HOSPITAL ENCOUNTER (OUTPATIENT)
Dept: RADIOLOGY | Facility: MEDICAL CENTER | Age: 67
End: 2022-02-03
Attending: FAMILY MEDICINE
Payer: MEDICARE

## 2022-02-03 DIAGNOSIS — Z13.820 SCREENING FOR OSTEOPOROSIS: ICD-10-CM

## 2022-02-03 DIAGNOSIS — N95.8 POSTARTIFICIAL MENOPAUSAL SYNDROME: ICD-10-CM

## 2022-02-03 DIAGNOSIS — Z78.0 MENOPAUSE: ICD-10-CM

## 2022-02-03 PROCEDURE — 77080 DXA BONE DENSITY AXIAL: CPT

## 2022-03-14 ENCOUNTER — HOSPITAL ENCOUNTER (OUTPATIENT)
Dept: RADIOLOGY | Facility: MEDICAL CENTER | Age: 67
End: 2022-03-14
Attending: UROLOGY
Payer: MEDICARE

## 2022-03-14 DIAGNOSIS — N20.0 CALCULUS OF KIDNEY: ICD-10-CM

## 2022-03-14 PROCEDURE — 74018 RADEX ABDOMEN 1 VIEW: CPT

## 2022-05-05 ENCOUNTER — HOSPITAL ENCOUNTER (OUTPATIENT)
Dept: LAB | Facility: MEDICAL CENTER | Age: 67
End: 2022-05-05
Attending: FAMILY MEDICINE
Payer: MEDICARE

## 2022-05-05 LAB
ALBUMIN SERPL BCP-MCNC: 4.3 G/DL (ref 3.2–4.9)
ALBUMIN/GLOB SERPL: 1.5 G/DL
ALP SERPL-CCNC: 146 U/L (ref 30–99)
ALT SERPL-CCNC: 17 U/L (ref 2–50)
ANION GAP SERPL CALC-SCNC: 12 MMOL/L (ref 7–16)
AST SERPL-CCNC: 15 U/L (ref 12–45)
BILIRUB SERPL-MCNC: 0.5 MG/DL (ref 0.1–1.5)
BUN SERPL-MCNC: 12 MG/DL (ref 8–22)
CALCIUM SERPL-MCNC: 9.8 MG/DL (ref 8.5–10.5)
CHLORIDE SERPL-SCNC: 103 MMOL/L (ref 96–112)
CHOLEST SERPL-MCNC: 135 MG/DL (ref 100–199)
CO2 SERPL-SCNC: 26 MMOL/L (ref 20–33)
CREAT SERPL-MCNC: 0.62 MG/DL (ref 0.5–1.4)
EST. AVERAGE GLUCOSE BLD GHB EST-MCNC: 240 MG/DL
FASTING STATUS PATIENT QL REPORTED: NORMAL
GFR SERPLBLD CREATININE-BSD FMLA CKD-EPI: 98 ML/MIN/1.73 M 2
GLOBULIN SER CALC-MCNC: 2.9 G/DL (ref 1.9–3.5)
GLUCOSE SERPL-MCNC: 183 MG/DL (ref 65–99)
HBA1C MFR BLD: 10 % (ref 4–5.6)
HDLC SERPL-MCNC: 50 MG/DL
LDLC SERPL CALC-MCNC: 59 MG/DL
POTASSIUM SERPL-SCNC: 4.1 MMOL/L (ref 3.6–5.5)
PROT SERPL-MCNC: 7.2 G/DL (ref 6–8.2)
SODIUM SERPL-SCNC: 141 MMOL/L (ref 135–145)
TRIGL SERPL-MCNC: 128 MG/DL (ref 0–149)

## 2022-05-05 PROCEDURE — 80061 LIPID PANEL: CPT

## 2022-05-05 PROCEDURE — 80053 COMPREHEN METABOLIC PANEL: CPT

## 2022-05-05 PROCEDURE — 83036 HEMOGLOBIN GLYCOSYLATED A1C: CPT

## 2022-05-05 PROCEDURE — 36415 COLL VENOUS BLD VENIPUNCTURE: CPT

## 2022-05-09 ENCOUNTER — OFFICE VISIT (OUTPATIENT)
Dept: ENDOCRINOLOGY | Facility: MEDICAL CENTER | Age: 67
End: 2022-05-09
Attending: INTERNAL MEDICINE
Payer: MEDICARE

## 2022-05-09 VITALS
HEIGHT: 63 IN | OXYGEN SATURATION: 93 % | DIASTOLIC BLOOD PRESSURE: 64 MMHG | SYSTOLIC BLOOD PRESSURE: 132 MMHG | WEIGHT: 218 LBS | HEART RATE: 89 BPM | BODY MASS INDEX: 38.62 KG/M2

## 2022-05-09 DIAGNOSIS — E78.5 DYSLIPIDEMIA: ICD-10-CM

## 2022-05-09 DIAGNOSIS — E11.65 TYPE 2 DIABETES MELLITUS WITH HYPERGLYCEMIA, WITH LONG-TERM CURRENT USE OF INSULIN (HCC): ICD-10-CM

## 2022-05-09 DIAGNOSIS — B37.31 YEAST VAGINITIS: ICD-10-CM

## 2022-05-09 DIAGNOSIS — I10 ESSENTIAL HYPERTENSION: ICD-10-CM

## 2022-05-09 DIAGNOSIS — Z79.4 LONG-TERM INSULIN USE (HCC): ICD-10-CM

## 2022-05-09 DIAGNOSIS — Z79.4 TYPE 2 DIABETES MELLITUS WITH HYPERGLYCEMIA, WITH LONG-TERM CURRENT USE OF INSULIN (HCC): ICD-10-CM

## 2022-05-09 PROCEDURE — 99211 OFF/OP EST MAY X REQ PHY/QHP: CPT | Performed by: INTERNAL MEDICINE

## 2022-05-09 PROCEDURE — 99205 OFFICE O/P NEW HI 60 MIN: CPT | Performed by: INTERNAL MEDICINE

## 2022-05-09 RX ORDER — INSULIN GLARGINE 100 [IU]/ML
30 INJECTION, SOLUTION SUBCUTANEOUS EVERY EVENING
COMMUNITY
End: 2022-05-09 | Stop reason: SDUPTHER

## 2022-05-09 RX ORDER — GLIMEPIRIDE 4 MG/1
TABLET ORAL
Qty: 200 TABLET | Refills: 2 | Status: SHIPPED | OUTPATIENT
Start: 2022-05-09 | End: 2022-08-15

## 2022-05-09 RX ORDER — DULAGLUTIDE 4.5 MG/.5ML
4.5 INJECTION, SOLUTION SUBCUTANEOUS
Qty: 2 ML | Refills: 6 | Status: SHIPPED | OUTPATIENT
Start: 2022-05-09 | End: 2022-05-09 | Stop reason: SDUPTHER

## 2022-05-09 RX ORDER — DAPAGLIFLOZIN 5 MG/1
1 TABLET, FILM COATED ORAL
Qty: 90 TABLET | Refills: 1 | Status: SHIPPED | OUTPATIENT
Start: 2022-05-09 | End: 2022-08-15 | Stop reason: SDUPTHER

## 2022-05-09 RX ORDER — FLUCONAZOLE 150 MG/1
150 TABLET ORAL DAILY
Qty: 3 TABLET | Refills: 1 | Status: SHIPPED | OUTPATIENT
Start: 2022-05-09 | End: 2022-11-01

## 2022-05-09 RX ORDER — GLIMEPIRIDE 4 MG/1
4 TABLET ORAL 2 TIMES DAILY
Qty: 200 TABLET | Refills: 2 | Status: SHIPPED | OUTPATIENT
Start: 2022-05-09 | End: 2023-03-17

## 2022-05-09 RX ORDER — GLIMEPIRIDE 4 MG/1
4 TABLET ORAL 2 TIMES DAILY
Qty: 180 TABLET | Refills: 2 | Status: SHIPPED | OUTPATIENT
Start: 2022-05-09 | End: 2022-05-09 | Stop reason: SDUPTHER

## 2022-05-09 RX ORDER — DULAGLUTIDE 4.5 MG/.5ML
4.5 INJECTION, SOLUTION SUBCUTANEOUS
Qty: 6 ML | Refills: 2 | Status: SHIPPED | OUTPATIENT
Start: 2022-05-09 | End: 2022-08-15 | Stop reason: SDUPTHER

## 2022-05-09 RX ORDER — INSULIN GLARGINE 100 [IU]/ML
40 INJECTION, SOLUTION SUBCUTANEOUS EVERY EVENING
Qty: 45 ML | Refills: 2 | Status: SHIPPED
Start: 2022-05-09 | End: 2023-02-28

## 2022-05-09 ASSESSMENT — PATIENT HEALTH QUESTIONNAIRE - PHQ9: CLINICAL INTERPRETATION OF PHQ2 SCORE: 0

## 2022-05-09 NOTE — PROGRESS NOTES
"Chief Complaint:  Consult requested by Carrington Gonzalez D.O. for initial evaluation of Type 2 Diabetes Mellitus    HPI:   Zena Hui is a 66 y.o. female with Type 2 Diabetes Mellitus diagnosed many years ago.  She denies hospitalizations for DKA in the past.    Her a1c is 10% on 5/5/2022  She tried and failed Jardiance due to recurrent yeast infections    She is on IR Metformin 500mg 2 pills twice a day.    Glimepiride 4mg bid  Trulicity 3.0mg weekly  Lantus 30u daily    She does not monitor her blood glucose    She admits to binge eating and snacking all the time.  She declined pharmacotherapy for this with Wellbutrin    She does have diabetic kidney disease  U ACR was 458 on January 27, 2022      She denies hypoglycemic episodes.  She  denies hypoglycemic unawareness. She denies episodes of severe hypoglycemia requiring third party assistance.  She  is not wearing a medical alert bracelet or necklace.  She does not a glucagon emergency kit.    She reports attending diabetes education classes.  Diet: \"healthy\" diet  in general.    Diabetes Complications   She  denies history of retinopathy.  She denies laser eye surgery. Last eye exam: Oct 2021 with Dr. Roberson  She denies history of peripheral sensory neuropathy.  She denies numbness, tingling in both feet.  She denies history of foot sores.   She denies history of kidney damage.  She is on ACE inhibitor or ARB.   She denies history of coronary artery disease.  She  denies history of stroke and denies TIA.  She denies history of PAD.  She reports history of hyperlipidemia.      ROS:     CONS:     No fever, no chills, no weight loss, no fatigue   EYES:      No diplopia, no blurry vision, no redness of eyes, no swelling of eyelids   ENT:    No hearing loss, No ear pain, No sore throat, no dysphagia, no neck swelling   CV:     No chest pain, no palpitations, no claudication, no orthopnea, no PND   PULM:    No SOB, no cough, no hemoptysis, no wheezing    GI: "   No nausea, no vomiting, no diarrhea, no constipation, no bloody stools   :  Passing urine well, no dysuria, no hematuria   ENDO:    Reports polyuria, reports polydipsia, no heat intolerance, no cold intolerance   NEURO: No headaches, no dizziness, no convulsions, no tremors   MUSC:  No joint swellings, no arthralgias, no myalgias, no weakness   SKIN:   No rash, no ulcers, no dry skin   PSYCH:   No depression, no anxiety, no difficulty sleeping       Past Medical History:  Patient Active Problem List    Diagnosis Date Noted   • Dyslipidemia 05/09/2022   • Long-term insulin use ([facility]) 05/09/2022   • Morbid obesity ([facility]) 11/03/2021   • Sedative, hypnotic, or anxiolytic dependence ([facility]) 11/03/2021   • Obesity (BMI 30-39.9) 07/05/2019   • Type 2 diabetes mellitus with hyperglycemia, with long-term current use of insulin ([facility]) 01/12/2012   • Vitamin d deficiency 03/11/2010   • Elevated cholesterol 06/17/2009   • Essential hypertension 06/17/2009   • Osteopenia 06/17/2009   • Sleep apnea 06/17/2009       Past Surgical History:  Past Surgical History:   Procedure Laterality Date   • MA CYSTOSCOPY,INSERT URETERAL STENT Bilateral 7/5/2021    Procedure: CYSTOSCOPY, WITH URETERAL STENT INSERTION - POSSIBLE STENT;  Surgeon: Italo Fair M.D.;  Location: [facility];  Service: Urology   • MA CYSTO/URETERO/PYELOSCOPY, DX Bilateral 7/5/2021    Procedure: URETEROSCOPY;  Surgeon: Italo Fair M.D.;  Location: [facility];  Service: Urology   • LASERTRIPSY Bilateral 7/5/2021    Procedure: LITHOTRIPSY, USING LASER.;  Surgeon: Italo Fair M.D.;  Location: [facility];  Service: Urology   • CYSTOSCOPY N/A 7/5/2019    Procedure: CYSTOSCOPY;  Surgeon: Espinoza Orr M.D.;  Location: [facility];  Service: Urology   • URETEROSCOPY Right 7/5/2019    Procedure: URETEROSCOPY;  Surgeon: Espinoza Orr M.D.;  Location: [facility];  Service: Urology   • LASERTRIPSY Right 7/5/2019     Procedure: LITHOTRIPSY, USING LASER;  Surgeon: Espinoza Orr M.D.;  Location: [facility];  Service: Urology   • STENT PLACEMENT Right 7/5/2019    Procedure: STENT PLACEMENT;  Surgeon: Espinoza Orr M.D.;  Location: [facility];  Service: Urology   • OTHER  07/2019    lithotripsy   • COLONOSCOPY WITH POLYP  October 2007    tubular adenoma   • OTHER ORTHOPEDIC SURGERY N/A 04/1998    laminectomy   • EMBOLECTOMY  1998    remove blood clot from spinal cord        Allergies:  Patient has no known allergies.     Current Medications:    Current Outpatient Medications:   •  insulin glargine (LANTUS SOLOSTAR) 100 UNIT/ML Solution Pen-injector injection, Inject 30 Units under the skin every evening., Disp: , Rfl:   •  Dulaglutide (TRULICITY) 3 MG/0.5ML Solution Pen-injector, Inject  under the skin., Disp: , Rfl:   •  Telmisartan-amLODIPine 80-5 MG Tab, Take  by mouth., Disp: , Rfl:   •  rosuvastatin (CRESTOR) 20 MG Tab, TAKE 1 TABLET BY MOUTH DAILY, Disp: , Rfl:   •  glimepiride (AMARYL) 4 MG TABS, Take 1 Tab by mouth 2 times a day., Disp: 180 Tab, Rfl: 3  •  metformin (GLUCOPHAGE) 500 MG TABS, Take 2 Tabs by mouth 2 times a day, with meals., Disp: 360 Tab, Rfl: 3  •  aspirin 81 MG tablet, Take 81 mg by mouth every day., Disp: , Rfl:   •  Insulin Detemir (LEVEMIR FLEXPEN SC), Inject 15 Units under the skin. AT  (Patient not taking: Reported on 5/9/2022), Disp: , Rfl:     Social History:  Social History     Socioeconomic History   • Marital status:      Spouse name: Not on file   • Number of children: Not on file   • Years of education: Not on file   • Highest education level: Not on file   Occupational History   • Not on file   Tobacco Use   • Smoking status: Never Smoker   • Smokeless tobacco: Never Used   Vaping Use   • Vaping Use: Never used   Substance and Sexual Activity   • Alcohol use: No   • Drug use: No   • Sexual activity: Yes     Partners: Male   Other Topics Concern   •  "Not on file   Social History Narrative   • Not on file     Social Determinants of Health     Financial Resource Strain: Not on file   Food Insecurity: Not on file   Transportation Needs: Not on file   Physical Activity: Not on file   Stress: Not on file   Social Connections: Not on file   Intimate Partner Violence: Not on file   Housing Stability: Not on file        Family History:   Family History   Problem Relation Age of Onset   • Arthritis Mother    • Lung Disease Mother         long term smoker   • Heart Disease Father    • Heart Disease Brother         CAD   S/P CABG       PHYSICAL EXAM:   Vital signs: /64 (BP Location: Left arm, Patient Position: Sitting, BP Cuff Size: Large adult)   Pulse 89   Ht 1.6 m (5' 3\")   Wt 98.9 kg (218 lb)   LMP 01/01/2009   SpO2 93%   BMI 38.62 kg/m²   GENERAL: Well-developed, well-nourished  in no apparent distress.   EYE: No ocular and eyelid asymmetry, Anicteric sclerae,  PERRL, No exophthalmos or lidlag  HENT: Hearing grossly intact, Normocephalic, atraumatic. Pink, moist mucous membranes, No exudate  NECK: Supple. Trachea midline. thyroid is normal in size without nodules or tenderness  CARDIOVASCULAR: Regular rate and rhythm. No murmurs, rubs, or gallops.   LUNGS: Clear to auscultation bilaterally   ABDOMEN: Soft, nontender with positive bowel sounds.   EXTREMITIES: No clubbing, cyanosis, or edema.   NEUROLOGICAL: Cranial nerves II-XII are grossly intact   Symmetric reflexes at the patella no proximal muscle weakness, No visible tremor with both outstretched hands  LYMPH: No cervical, supraclavicular,  adenopathy palpated.   SKIN: No rashes, lesions. Turgor is normal.  FOOT: Normal sensation to monofilament testing, normal pulses, no ulcers.  Normal Vibration quantitative sensation test.    Labs:  Lab Results   Component Value Date/Time    HBA1C 10.0 (H) 05/05/2022 0653    AVGLUC 240 05/05/2022 0653       Lab Results   Component Value Date/Time    WBC 7.3 " 07/05/2019 12:25 PM    WBC 7.0 07/20/2010 08:50 AM    RBC 4.82 07/05/2019 12:25 PM    RBC 4.96 07/20/2010 08:50 AM    HEMOGLOBIN 13.3 07/05/2019 12:25 PM    MCV 87.8 07/05/2019 12:25 PM    MCV 90 07/20/2010 08:50 AM    MCH 27.6 07/05/2019 12:25 PM    MCH 28.0 07/20/2010 08:50 AM    MCHC 31.4 (L) 07/05/2019 12:25 PM    RDW 43.3 07/05/2019 12:25 PM    RDW 15.2 (H) 07/20/2010 08:50 AM    MPV 9.2 07/05/2019 12:25 PM       Lab Results   Component Value Date/Time    SODIUM 141 05/05/2022 06:53 AM    POTASSIUM 4.1 05/05/2022 06:53 AM    CHLORIDE 103 05/05/2022 06:53 AM    CO2 26 05/05/2022 06:53 AM    ANION 12.0 05/05/2022 06:53 AM    GLUCOSE 183 (H) 05/05/2022 06:53 AM    BUN 12 05/05/2022 06:53 AM    CREATININE 0.62 05/05/2022 06:53 AM    CREATININE 0.77 07/20/2010 08:50 AM    CALCIUM 9.8 05/05/2022 06:53 AM    ASTSGOT 15 05/05/2022 06:53 AM    ALTSGPT 17 05/05/2022 06:53 AM    TBILIRUBIN 0.5 05/05/2022 06:53 AM    ALBUMIN 4.3 05/05/2022 06:53 AM    TOTPROTEIN 7.2 05/05/2022 06:53 AM    GLOBULIN 2.9 05/05/2022 06:53 AM    AGRATIO 1.5 05/05/2022 06:53 AM       Lab Results   Component Value Date/Time    CHOLSTRLTOT 135 05/05/2022 0653    TRIGLYCERIDE 128 05/05/2022 0653    HDL 50 05/05/2022 0653    LDL 59 05/05/2022 0653       Lab Results   Component Value Date/Time    MALBCRT 458 (H) 01/27/2022 07:30 AM    MICROALBUR 40.8 01/27/2022 07:30 AM        Lab Results   Component Value Date/Time    TSHMIGUEL ÁNGEL 1.570 06/10/2013 0832     No results found for: FREEDIR  No results found for: FREET3  No results found for: THYSTIMIG      ASSESSMENT/PLAN:     1. Type 2 diabetes mellitus with hyperglycemia, with long-term current use of insulin (HCC)  Uncontrolled secondary to hyperglycemia  Recommend better compliance with blood sugar monitoring  Will refer to [employer] nutrition  Increase Lantus to 40 units daily  Increase Trulicity to 4.5 mg weekly  Start Farxiga 5 mg daily  Continue metformin  Recommend that she reduce her  baking  Recommend she check her sugars regularly  Follow-up in 3 months with repeat of A1c    2. Dyslipidemia  Controlled  Continue current statin  Repeat fasting lipids in 12 months    3. Essential hypertension  Controlled  Continue current medications  Repeat urine microalbumin in 3 months    4. Long-term insulin use (HCC)  Patient is on long-term basal insulin therapy      Return in about 3 months (around 8/9/2022).       This patient during there office visit was started on new medication.  Side effects of new medications were discussed with the patient today in the office. The patient was supplied paperwork on this new medication.    Total time spent of DOS was over 60mins which included obtaining a detailed history and physical exam, ordering labs coordinating care and scheduling him for future follow up     Thank you kindly for allowing me to participate in the diabetes care plan for this patient.    Trent Timmons MD, [facility]  05/09/22    CC:   Carrington Gonzalez D.O.

## 2022-05-09 NOTE — PROGRESS NOTES
CC: Follow-up for JASPER on AutoPap        HPI:  Zena Hui is a 66 y.o.female  kindly referred by Carrington Gonzalez D.O. last seen via telemedicine on 6/25/2021 when her compliance was only 41% for almost 6 hours nightly with an average normalized AHI 0.9.  She reported that time she often fell asleep in her recliner and found it to be more comfortable than in her bed.    She is been on CPAP since about 2008.  Her updated home sleep test on July 2020 showed an AHI 54/lotus saturation 73%.  She has been treated with auto titrating CPAP 5 to 12 cm water.    She has been experiencing aerophagia she has not been using her machine as much as possible.    Her 60-day compliance is 37% for 5 hours and 1 minute.  On the air sense 10 AutoSet her settings are 5-12 cm water.  Her median pressure is 11.4 cm water and her maximum is 12.0 cm water.  Advised to try a wedge pillow at 35 to 40 degree angle to see if this helps with the aerophagia.  Also she may want to consider getting a new mask        Significant comorbidities and modifying factors include morbid obesity, dyslipidemia, type 2 diabetes, essential hypertension, never smoker, and need for influenza vaccination.            Patient Active Problem List    Diagnosis Date Noted   • Dyslipidemia 05/09/2022   • Long-term insulin use (HCC) 05/09/2022   • Morbid obesity ([facility]) 11/03/2021   • Sedative, hypnotic, or anxiolytic dependence ([facility]) 11/03/2021   • Obesity (BMI 30-39.9) 07/05/2019   • Type 2 diabetes mellitus with hyperglycemia, with long-term current use of insulin ([facility]) 01/12/2012   • Vitamin d deficiency 03/11/2010   • Elevated cholesterol 06/17/2009   • Essential hypertension 06/17/2009   • Osteopenia 06/17/2009   • Sleep apnea 06/17/2009       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   • Bronchitis    • Essential hypertension, benign    • High cholesterol    • Hyperlipidemia    •  Mixed hyperlipidemia    • Organic sleep apnea, unspecified    • Osteopenia    • Renal disorder 04/2019    kidney stones   • Sleep apnea    • Type II or unspecified type diabetes mellitus without mention of complication, not stated as uncontrolled     oral meds        Past Surgical History:   Procedure Laterality Date   • [place] CYSTOSCOPY,INSERT URETERAL STENT Bilateral 7/5/2021    Procedure: CYSTOSCOPY, WITH URETERAL STENT INSERTION - POSSIBLE STENT;  Surgeon: Italo Fair M.D.;  Location: [facility];  Service: Urology   • [place] CYSTO/URETERO/PYELOSCOPY, DX Bilateral 7/5/2021    Procedure: URETEROSCOPY;  Surgeon: Italo Fair M.D.;  Location: [facility];  Service: Urology   • LASERTRIPSY Bilateral 7/5/2021    Procedure: LITHOTRIPSY, USING LASER.;  Surgeon: Italo Fair M.D.;  Location: [facility];  Service: Urology   • CYSTOSCOPY N/A 7/5/2019    Procedure: CYSTOSCOPY;  Surgeon: Espinoza Orr M.D.;  Location: [facility];  Service: Urology   • URETEROSCOPY Right 7/5/2019    Procedure: URETEROSCOPY;  Surgeon: Espinoza Orr M.D.;  Location: [facility];  Service: Urology   • LASERTRIPSY Right 7/5/2019    Procedure: LITHOTRIPSY, USING LASER;  Surgeon: Espinoza Orr M.D.;  Location: [facility];  Service: Urology   • STENT PLACEMENT Right 7/5/2019    Procedure: STENT PLACEMENT;  Surgeon: Espinoza Orr M.D.;  Location: [facility];  Service: Urology   • OTHER  07/2019    lithotripsy   • COLONOSCOPY WITH POLYP  October 2007    tubular adenoma   • OTHER ORTHOPEDIC SURGERY N/A 04/1998    laminectomy   • EMBOLECTOMY  1998    remove blood clot from spinal cord       Family History   Problem Relation Age of Onset   • Arthritis Mother    • Lung Disease Mother         long term smoker   • Heart Disease Father    • Heart Disease Brother         CAD   S/P CABG       Social History     Socioeconomic History   • Marital status:       "Spouse name: Not on file   • Number of children: Not on file   • Years of education: Not on file   • Highest education level: Not on file   Occupational History   • Not on file   Tobacco Use   • Smoking status: Never Smoker   • Smokeless tobacco: Never Used   Vaping Use   • Vaping Use: Never used   Substance and Sexual Activity   • Alcohol use: No   • Drug use: No   • Sexual activity: Yes     Partners: Male   Other Topics Concern   • Not on file   Social History Narrative   • Not on file     Social Determinants of Health     Financial Resource Strain: Not on file   Food Insecurity: Not on file   Transportation Needs: Not on file   Physical Activity: Not on file   Stress: Not on file   Social Connections: Not on file   Intimate Partner Violence: Not on file   Housing Stability: Not on file       Current Outpatient Medications   Medication Sig Dispense Refill   • insulin glargine (LANTUS SOLOSTAR) 100 UNIT/ML Solution Pen-injector injection Inject 40 Units under the skin every evening. 45 mL 2   • Dulaglutide (TRULICITY) 4.5 MG/0.5ML Solution Pen-injector Inject 4.5 mg under the skin every 7 days. 2.0ml equals 4 pens per month 6 mL 2   • Dapagliflozin Propanediol (FARXIGA) 5 MG Tab Take 1 Tablet by mouth every morning with breakfast. 90 Tablet 1   • fluconazole (DIFLUCAN) 150 MG tablet Take 1 Tablet by mouth every day for 3 days. 3 Tablet 1   • glimepiride (AMARYL) 4 MG Tab TAKE 1 TABLET BY MOUTH TWICE A  Tablet 2   • glimepiride (AMARYL) 4 MG Tab Take 1 Tablet by mouth 2 times a day. 200 Tablet 2   • Telmisartan-amLODIPine 80-5 MG Tab Take  by mouth.     • rosuvastatin (CRESTOR) 20 MG Tab TAKE 1 TABLET BY MOUTH DAILY     • metformin (GLUCOPHAGE) 500 MG TABS Take 2 Tabs by mouth 2 times a day, with meals. 360 Tab 3   • aspirin 81 MG tablet Take 81 mg by mouth every day.       No current facility-administered medications for this visit.    \"CURRENT RX\"    ALLERGIES: Patient has no known " "allergies.    ROS    Constitutional: Denies fever, chills, sweats,  weight loss, fatigue  Cardiovascular: Denies chest pain, tightness, palpitations, swelling in legs/feet, fainting, difficulty breathing when lying down but gets better when sitting up.   Respiratory: Denies shortness of breath, cough, sputum, wheezing, painful breathing, coughing up blood.   Sleep: per HPI  Gastrointestinal: Denies  difficulty swallowing, nausea, abdominal pain, diarrhea, constipation, heartburn.  Musculoskeletal: Denies painful joints, sore muscles, back pain.        PHYSICAL EXAM    /70 (BP Location: Right arm, Patient Position: Sitting, BP Cuff Size: Adult)   Pulse 91   Resp 16   Ht 1.6 m (5' 3\")   Wt 98 kg (216 lb)   LMP 01/01/2009   SpO2 94%   BMI 38.26 kg/m²   Appearance: Well-nourished, well-developed, no acute distress  Eyes:  PERRLA, EOMI  ENMT: masked  Neck: Supple, trachea midline, no masses  Respiratory effort:  No intercostal retractions or use of accessory muscles  Lung auscultation:  No wheezes rhonchi rubs or rales  Cardiac: No murmurs, rubs, or gallops; regular rhythm, normal rate; no edema  Abdomen:  No tenderness, no organomegaly.  Musculoskeletal:  Grossly normal; gait and station normal; digits and nails normal  Skin:  No rashes, petechiae, cyanosis  Neurologic: without focal signs; oriented to person, time, place, and purpose; judgement intact  Psychiatric:  No depression, anxiety, agitation      Medical Decision Making       The medical record was reviewed in its entirety including the referral notes, records from primary care, consultants notes, hospital records, lab, imaging, microbiology, immunology, and immunizations.  Care gaps identified and reviewed with the patient.    Diagnostic and titration nocturnal polysomnogram's, home sleep apnea tests, continuous nocturnal oximetry results, multiple sleep latency tests, and compliance reports reviewed.  1. JASPER (obstructive sleep apnea)  - DME Mask " and Supplies      PLAN:   Has been advised to continue the current  APAP, clean equipment frequently, and get new mask and supplies as allowed by insurance and DME. Advised about potential problems including nasal obstruction/stuffiness, mask leak or discomfort , frequent awakenings, chill or dryness of the upper airway, noise, inconvenience, and lack of benefit. Recommend an earlier appointment, if significant treatment barriers develop.    The risks of untreated sleep apnea were discussed with the patient at length. Patients with JASPER are at increased risk of cardiovascular disease including coronary artery disease, systemic arterial hypertension, pulmonary arterial hypertension, cardiac arrythmias, and stroke. JASPER patients have an increased risk of motor vehicle accidents, type 2 diabetes, chronic kidney disease, and non-alcoholic liver disease. The patient was advised to avoid driving a motor vehicle when drowsy.    Positive airway pressure will favorably impact many of the adverse conditions and effects provoked by JASPER.    Have advised the patient to follow up with the appropriate healthcare practitioners for all other medical problems and issues.    N95 mask wearing, handwashing, and social distancing protocols reviewed and encouraged.    Return in about 1 year (around 5/11/2023).    Total time 30 minutes

## 2022-05-11 ENCOUNTER — OFFICE VISIT (OUTPATIENT)
Dept: SLEEP MEDICINE | Facility: MEDICAL CENTER | Age: 67
End: 2022-05-11
Payer: MEDICARE

## 2022-05-11 VITALS
DIASTOLIC BLOOD PRESSURE: 70 MMHG | OXYGEN SATURATION: 94 % | BODY MASS INDEX: 38.27 KG/M2 | HEIGHT: 63 IN | HEART RATE: 91 BPM | WEIGHT: 216 LBS | SYSTOLIC BLOOD PRESSURE: 138 MMHG | RESPIRATION RATE: 16 BRPM

## 2022-05-11 DIAGNOSIS — G47.33 OSA (OBSTRUCTIVE SLEEP APNEA): ICD-10-CM

## 2022-05-11 PROCEDURE — 99214 OFFICE O/P EST MOD 30 MIN: CPT | Performed by: INTERNAL MEDICINE

## 2022-08-01 PROBLEM — R80.9 MICROALBUMINURIA DUE TO TYPE 2 DIABETES MELLITUS (HCC): Status: ACTIVE | Noted: 2022-08-01

## 2022-08-01 PROBLEM — E11.9 DIABETES MELLITUS WITH COINCIDENT HYPERTENSION (HCC): Status: ACTIVE | Noted: 2022-08-01

## 2022-08-01 PROBLEM — E11.65 UNCONTROLLED TYPE 2 DIABETES MELLITUS WITH HYPERGLYCEMIA (HCC): Status: ACTIVE | Noted: 2022-08-01

## 2022-08-01 PROBLEM — E78.5 HYPERLIPIDEMIA ASSOCIATED WITH TYPE 2 DIABETES MELLITUS (HCC): Status: ACTIVE | Noted: 2022-08-01

## 2022-08-01 PROBLEM — I10 DIABETES MELLITUS WITH COINCIDENT HYPERTENSION (HCC): Status: ACTIVE | Noted: 2022-08-01

## 2022-08-01 PROBLEM — E11.29 MICROALBUMINURIA DUE TO TYPE 2 DIABETES MELLITUS (HCC): Status: ACTIVE | Noted: 2022-08-01

## 2022-08-01 PROBLEM — E11.69 HYPERLIPIDEMIA ASSOCIATED WITH TYPE 2 DIABETES MELLITUS (HCC): Status: ACTIVE | Noted: 2022-08-01

## 2022-08-10 ENCOUNTER — HOSPITAL ENCOUNTER (OUTPATIENT)
Dept: LAB | Facility: MEDICAL CENTER | Age: 67
End: 2022-08-10
Attending: INTERNAL MEDICINE
Payer: MEDICARE

## 2022-08-10 DIAGNOSIS — Z79.4 TYPE 2 DIABETES MELLITUS WITH HYPERGLYCEMIA, WITH LONG-TERM CURRENT USE OF INSULIN (HCC): ICD-10-CM

## 2022-08-10 DIAGNOSIS — Z79.4 LONG-TERM INSULIN USE (HCC): ICD-10-CM

## 2022-08-10 DIAGNOSIS — B37.31 YEAST VAGINITIS: ICD-10-CM

## 2022-08-10 DIAGNOSIS — I10 ESSENTIAL HYPERTENSION: ICD-10-CM

## 2022-08-10 DIAGNOSIS — E78.5 DYSLIPIDEMIA: ICD-10-CM

## 2022-08-10 DIAGNOSIS — E11.65 TYPE 2 DIABETES MELLITUS WITH HYPERGLYCEMIA, WITH LONG-TERM CURRENT USE OF INSULIN (HCC): ICD-10-CM

## 2022-08-10 LAB
ALBUMIN SERPL BCP-MCNC: 4.5 G/DL (ref 3.2–4.9)
ALBUMIN/GLOB SERPL: 1.6 G/DL
ALP SERPL-CCNC: 131 U/L (ref 30–99)
ALT SERPL-CCNC: 14 U/L (ref 2–50)
ANION GAP SERPL CALC-SCNC: 13 MMOL/L (ref 7–16)
AST SERPL-CCNC: 12 U/L (ref 12–45)
BILIRUB SERPL-MCNC: 0.5 MG/DL (ref 0.1–1.5)
BUN SERPL-MCNC: 12 MG/DL (ref 8–22)
CALCIUM SERPL-MCNC: 9.4 MG/DL (ref 8.5–10.5)
CHLORIDE SERPL-SCNC: 105 MMOL/L (ref 96–112)
CO2 SERPL-SCNC: 23 MMOL/L (ref 20–33)
CREAT SERPL-MCNC: 0.7 MG/DL (ref 0.5–1.4)
FASTING STATUS PATIENT QL REPORTED: NORMAL
GFR SERPLBLD CREATININE-BSD FMLA CKD-EPI: 95 ML/MIN/1.73 M 2
GLOBULIN SER CALC-MCNC: 2.8 G/DL (ref 1.9–3.5)
GLUCOSE SERPL-MCNC: 158 MG/DL (ref 65–99)
POTASSIUM SERPL-SCNC: 3.9 MMOL/L (ref 3.6–5.5)
PROT SERPL-MCNC: 7.3 G/DL (ref 6–8.2)
SODIUM SERPL-SCNC: 141 MMOL/L (ref 135–145)
TSH SERPL DL<=0.005 MIU/L-ACNC: 4.13 UIU/ML (ref 0.38–5.33)

## 2022-08-10 PROCEDURE — 86341 ISLET CELL ANTIBODY: CPT

## 2022-08-10 PROCEDURE — 84443 ASSAY THYROID STIM HORMONE: CPT

## 2022-08-10 PROCEDURE — 36415 COLL VENOUS BLD VENIPUNCTURE: CPT

## 2022-08-10 PROCEDURE — 80053 COMPREHEN METABOLIC PANEL: CPT

## 2022-08-10 PROCEDURE — 84681 ASSAY OF C-PEPTIDE: CPT

## 2022-08-12 LAB — C PEPTIDE SERPL-MCNC: 2.5 NG/ML (ref 0.5–3.3)

## 2022-08-13 LAB — GAD65 AB SER IA-ACNC: <5 IU/ML (ref 0–5)

## 2022-08-15 ENCOUNTER — OFFICE VISIT (OUTPATIENT)
Dept: ENDOCRINOLOGY | Facility: MEDICAL CENTER | Age: 67
End: 2022-08-15
Attending: INTERNAL MEDICINE
Payer: MEDICARE

## 2022-08-15 VITALS
HEART RATE: 85 BPM | BODY MASS INDEX: 37.74 KG/M2 | HEIGHT: 63 IN | SYSTOLIC BLOOD PRESSURE: 123 MMHG | WEIGHT: 213 LBS | DIASTOLIC BLOOD PRESSURE: 64 MMHG | OXYGEN SATURATION: 96 %

## 2022-08-15 DIAGNOSIS — E78.5 DYSLIPIDEMIA: ICD-10-CM

## 2022-08-15 DIAGNOSIS — I10 ESSENTIAL HYPERTENSION: ICD-10-CM

## 2022-08-15 DIAGNOSIS — Z79.4 TYPE 2 DIABETES MELLITUS WITH HYPERGLYCEMIA, WITH LONG-TERM CURRENT USE OF INSULIN (HCC): ICD-10-CM

## 2022-08-15 DIAGNOSIS — Z79.4 LONG-TERM INSULIN USE (HCC): ICD-10-CM

## 2022-08-15 DIAGNOSIS — E11.65 TYPE 2 DIABETES MELLITUS WITH HYPERGLYCEMIA, WITH LONG-TERM CURRENT USE OF INSULIN (HCC): ICD-10-CM

## 2022-08-15 LAB
HBA1C MFR BLD: 9.7 % (ref 0–5.6)
INT CON NEG: ABNORMAL
INT CON POS: ABNORMAL

## 2022-08-15 PROCEDURE — 99212 OFFICE O/P EST SF 10 MIN: CPT | Performed by: INTERNAL MEDICINE

## 2022-08-15 PROCEDURE — 83036 HEMOGLOBIN GLYCOSYLATED A1C: CPT | Performed by: INTERNAL MEDICINE

## 2022-08-15 PROCEDURE — 99214 OFFICE O/P EST MOD 30 MIN: CPT | Performed by: INTERNAL MEDICINE

## 2022-08-15 RX ORDER — DULAGLUTIDE 4.5 MG/.5ML
4.5 INJECTION, SOLUTION SUBCUTANEOUS
Qty: 6 ML | Refills: 2 | Status: SHIPPED | OUTPATIENT
Start: 2022-08-15 | End: 2023-05-04

## 2022-08-15 RX ORDER — DAPAGLIFLOZIN 5 MG/1
1 TABLET, FILM COATED ORAL
Qty: 90 TABLET | Refills: 1 | Status: SHIPPED
Start: 2022-08-15 | End: 2023-02-28

## 2022-08-15 NOTE — PROGRESS NOTES
CHIEF COMPLAINT: Patient is here for follow up of Type 2 Diabetes Mellitus    HPI:     Zena Hui is a 67 y.o. female with Type 2 Diabetes Mellitus here for follow up.    Labs from 8/15/2022 HbA1c is 9.7%      On follow up she is taking  Lantus 42u daily  Trulicity 4.5mg weekly  Metformin 1000mg bid  Glimepiride 4mg bid  Farxiga 5mg daily- not taking      She denies side effects on her medications  She did go to the diabetes education class despite the referral  She did not bring her meter despite repeated requests to do so      She has hyperlipidemia and is on Crestor 20 mg daily  She denies a history of coronary artery disease and cerebrovascular disease  LDL cholesterol was less than 70 on May 5, 2022      She does have diabetic kidney disease  Her blood pressure is fairly controlled  Her U ACR was over 300 on January 2022  She is on an ARB (telmisartan)      Her last eye exam was on October 27, 2021          BG Diary:08/15/22  Patient did not bring meter    Weight has decreased 5 pounds over last 3 mos    Diabetes Complications   Retinopathy: No known retinopathy.  Last eye exam: October 2021  Neuropathy: Denies paresthesias or numbness in hands or feet. Denies any foot wounds.  Exercise: Minimal.  Diet: Fair.  Patient's medications, allergies, and social histories were reviewed and updated as appropriate.    ROS:     CONS:     No fever, no chills   EYES:     No diplopia, no blurry vision   CV:           No chest pain, no palpitations   PULM:     No SOB, no cough, no hemoptysis.   GI:            No nausea, no vomiting, no diarrhea, no constipation   ENDO:     No polyuria, no polydipsia, no heat intolerance, no cold intolerance       Past Medical History:  Problem List:  2022-08: BMI 39.0-39.9,adult  2022-08: Diabetes mellitus with coincident hypertension (HCC)  2022-08: Hyperlipidemia associated with type 2 diabetes mellitus (HCC)  2022-08: Uncontrolled type 2 diabetes mellitus with hyperglycemia    ([facility])  2022-08: Microalbuminuria due to type 2 diabetes mellitus ([facility])  2022-05: Dyslipidemia  2022-05: Long-term insulin use ([facility])  2021-11: Morbid obesity ([facility])  2021-11: Sedative, hypnotic, or anxiolytic dependence ([facility])  2019-07: Obesity (BMI 30-39.9)  2012-01: Type 2 diabetes mellitus with hyperglycemia, with long-term   current use of insulin ([facility])  2010-03: Vitamin d deficiency  2009-06: Diabetes  2009-06: Elevated cholesterol  2009-06: Essential hypertension  2009-06: Osteopenia  2009-06: JASPER on CPAP      Past Surgical History:  Past Surgical History:   Procedure Laterality Date    NJ CYSTOSCOPY,INSERT URETERAL STENT Bilateral 7/5/2021    Procedure: CYSTOSCOPY, WITH URETERAL STENT INSERTION - POSSIBLE STENT;  Surgeon: Italo Fair M.D.;  Location: [facility];  Service: Urology    NJ CYSTO/URETERO/PYELOSCOPY, DX Bilateral 7/5/2021    Procedure: URETEROSCOPY;  Surgeon: Italo Fair M.D.;  Location: [facility];  Service: Urology    LASERTRIPSY Bilateral 7/5/2021    Procedure: LITHOTRIPSY, USING LASER.;  Surgeon: Italo Fair M.D.;  Location: [facility];  Service: Urology    CYSTOSCOPY N/A 7/5/2019    Procedure: CYSTOSCOPY;  Surgeon: Espinoza Orr M.D.;  Location: [facility];  Service: Urology    URETEROSCOPY Right 7/5/2019    Procedure: URETEROSCOPY;  Surgeon: Espinoza Orr M.D.;  Location: [facility];  Service: Urology    LASERTRIPSY Right 7/5/2019    Procedure: LITHOTRIPSY, USING LASER;  Surgeon: Espinoza Orr M.D.;  Location: [facility];  Service: Urology    STENT PLACEMENT Right 7/5/2019    Procedure: STENT PLACEMENT;  Surgeon: Espinoza Orr M.D.;  Location: [facility];  Service: Urology    OTHER  07/2019    lithotripsy    COLONOSCOPY WITH POLYP  October 2007    tubular adenoma    OTHER ORTHOPEDIC SURGERY N/A 04/1998    laminectomy    EMBOLECTOMY  1998    remove blood clot from spinal cord     "    Allergies:  Amoxicillin     Social History:  Social History     Tobacco Use    Smoking status: Never    Smokeless tobacco: Never   Vaping Use    Vaping Use: Never used   Substance Use Topics    Alcohol use: No    Drug use: No        Family History:   family history includes Arthritis in her mother; Heart Disease in her brother and father; Lung Disease in her mother.      PHYSICAL EXAM:   OBJECTIVE:  Vital signs: /64 (BP Location: Left arm, Patient Position: Sitting, BP Cuff Size: Adult)   Pulse 85   Ht 1.6 m (5' 3\")   Wt 96.6 kg (213 lb)   LMP 01/01/2009   SpO2 96%   BMI 37.73 kg/m²   GENERAL: Well-developed, well-nourished in no apparent distress.   EYE:  No ocular asymmetry, PERRLA  HENT: Pink, moist mucous membranes.    NECK: No thyromegaly.   CARDIOVASCULAR:  No murmurs  LUNGS: Clear breath sounds  ABDOMEN: Soft, nontender   EXTREMITIES: No clubbing, cyanosis, or edema.   NEUROLOGICAL: No gross focal motor abnormalities   LYMPH: No cervical adenopathy palpated.   SKIN: No rashes, lesions.     Labs:  Lab Results   Component Value Date/Time    HBA1C 9.7 (A) 08/15/2022 09:56 AM        Lab Results   Component Value Date/Time    WBC 7.3 07/05/2019 12:25 PM    WBC 7.0 07/20/2010 08:50 AM    RBC 4.82 07/05/2019 12:25 PM    RBC 4.96 07/20/2010 08:50 AM    HEMOGLOBIN 13.3 07/05/2019 12:25 PM    MCV 87.8 07/05/2019 12:25 PM    MCV 90 07/20/2010 08:50 AM    MCH 27.6 07/05/2019 12:25 PM    MCH 28.0 07/20/2010 08:50 AM    MCHC 31.4 (L) 07/05/2019 12:25 PM    RDW 43.3 07/05/2019 12:25 PM    RDW 15.2 (H) 07/20/2010 08:50 AM    MPV 9.2 07/05/2019 12:25 PM       Lab Results   Component Value Date/Time    SODIUM 141 08/10/2022 06:31 AM    POTASSIUM 3.9 08/10/2022 06:31 AM    CHLORIDE 105 08/10/2022 06:31 AM    CO2 23 08/10/2022 06:31 AM    ANION 13.0 08/10/2022 06:31 AM    GLUCOSE 158 (H) 08/10/2022 06:31 AM    BUN 12 08/10/2022 06:31 AM    CREATININE 0.70 08/10/2022 06:31 AM    CREATININE 0.77 07/20/2010 08:50 AM "    CALCIUM 9.4 08/10/2022 06:31 AM    ASTSGOT 12 08/10/2022 06:31 AM    ALTSGPT 14 08/10/2022 06:31 AM    TBILIRUBIN 0.5 08/10/2022 06:31 AM    ALBUMIN 4.5 08/10/2022 06:31 AM    TOTPROTEIN 7.3 08/10/2022 06:31 AM    GLOBULIN 2.8 08/10/2022 06:31 AM    AGRATIO 1.6 08/10/2022 06:31 AM       Lab Results   Component Value Date/Time    CHOLSTRLTOT 135 05/05/2022 0653    TRIGLYCERIDE 128 05/05/2022 0653    HDL 50 05/05/2022 0653    LDL 59 05/05/2022 0653       Lab Results   Component Value Date/Time    MALBCRT 458 (H) 01/27/2022 07:30 AM    MICROALBUR 40.8 01/27/2022 07:30 AM        Lab Results   Component Value Date/Time    TSHULTRASEN 4.130 08/10/2022 0631     No results found for: FREEDIR  No results found for: FREET3  No results found for: THYSTIMIG        ASSESSMENT/PLAN:     1. Type 2 diabetes mellitus with hyperglycemia, with long-term current use of insulin (HCC)  Uncontrolled secondary to hyperglycemia and noncompliance with diet  Restart Farxiga 5 mg daily  Continue Lantus 42 units daily  Continue metformin  Continue glimepiride  Continue Trulicity  Recommend that she go to the nutritionist  Recommend that she cut back on carbs  Recommend that she follow-up with our diabetes nurse Lisa in 3 months    2. Dyslipidemia  Controlled  Continue Crestor    3. Essential hypertension  Controlled  Continue telmisartan    4. Long-term insulin use (HCC)  Patient is on long-term basal insulin therapy plus other oral agents and a GLP-1 for type 2 diabetes management      Return in about 3 months (around 11/15/2022).      Thank you kindly for allowing me to participate in the diabetes care plan for this patient.    Trent Timmons MD, SHARAD, [facility]  08/15/22    CC:   Carrington Gonzalez D.O.

## 2022-11-01 DIAGNOSIS — B37.31 YEAST VAGINITIS: ICD-10-CM

## 2022-11-01 RX ORDER — FLUCONAZOLE 150 MG/1
150 TABLET ORAL DAILY
Qty: 3 TABLET | Refills: 1 | Status: SHIPPED | OUTPATIENT
Start: 2022-11-01 | End: 2022-11-04

## 2022-11-15 ENCOUNTER — HOSPITAL ENCOUNTER (OUTPATIENT)
Dept: RADIOLOGY | Facility: MEDICAL CENTER | Age: 67
End: 2022-11-15
Attending: FAMILY MEDICINE
Payer: MEDICARE

## 2022-11-15 DIAGNOSIS — M79.661 BILATERAL CALF PAIN: ICD-10-CM

## 2022-11-15 DIAGNOSIS — M79.662 PAIN OF LEFT LOWER LEG: ICD-10-CM

## 2022-11-15 DIAGNOSIS — R60.9 EDEMA, UNSPECIFIED TYPE: ICD-10-CM

## 2022-11-15 DIAGNOSIS — M79.662 BILATERAL CALF PAIN: ICD-10-CM

## 2022-11-15 PROCEDURE — 93970 EXTREMITY STUDY: CPT

## 2022-11-18 ENCOUNTER — HOSPITAL ENCOUNTER (OUTPATIENT)
Dept: LAB | Facility: MEDICAL CENTER | Age: 67
End: 2022-11-18
Attending: FAMILY MEDICINE
Payer: MEDICARE

## 2022-11-18 LAB
ALBUMIN SERPL BCP-MCNC: 4.5 G/DL (ref 3.2–4.9)
ALBUMIN/GLOB SERPL: 1.3 G/DL
ALP SERPL-CCNC: 142 U/L (ref 30–99)
ALT SERPL-CCNC: 21 U/L (ref 2–50)
ANION GAP SERPL CALC-SCNC: 11 MMOL/L (ref 7–16)
AST SERPL-CCNC: 17 U/L (ref 12–45)
BASOPHILS # BLD AUTO: 0.8 % (ref 0–1.8)
BASOPHILS # BLD: 0.08 K/UL (ref 0–0.12)
BILIRUB SERPL-MCNC: 0.5 MG/DL (ref 0.1–1.5)
BUN SERPL-MCNC: 15 MG/DL (ref 8–22)
CALCIUM SERPL-MCNC: 10 MG/DL (ref 8.5–10.5)
CHLORIDE SERPL-SCNC: 102 MMOL/L (ref 96–112)
CHOLEST SERPL-MCNC: 150 MG/DL (ref 100–199)
CO2 SERPL-SCNC: 26 MMOL/L (ref 20–33)
CREAT SERPL-MCNC: 0.67 MG/DL (ref 0.5–1.4)
EOSINOPHIL # BLD AUTO: 0.3 K/UL (ref 0–0.51)
EOSINOPHIL NFR BLD: 3 % (ref 0–6.9)
ERYTHROCYTE [DISTWIDTH] IN BLOOD BY AUTOMATED COUNT: 43.9 FL (ref 35.9–50)
EST. AVERAGE GLUCOSE BLD GHB EST-MCNC: 223 MG/DL
FASTING STATUS PATIENT QL REPORTED: NORMAL
GFR SERPLBLD CREATININE-BSD FMLA CKD-EPI: 95 ML/MIN/1.73 M 2
GLOBULIN SER CALC-MCNC: 3.4 G/DL (ref 1.9–3.5)
GLUCOSE SERPL-MCNC: 127 MG/DL (ref 65–99)
HBA1C MFR BLD: 9.4 % (ref 4–5.6)
HCT VFR BLD AUTO: 47.9 % (ref 37–47)
HDLC SERPL-MCNC: 58 MG/DL
HGB BLD-MCNC: 14.9 G/DL (ref 12–16)
IMM GRANULOCYTES # BLD AUTO: 0.05 K/UL (ref 0–0.11)
IMM GRANULOCYTES NFR BLD AUTO: 0.5 % (ref 0–0.9)
LDLC SERPL CALC-MCNC: 64 MG/DL
LYMPHOCYTES # BLD AUTO: 2.69 K/UL (ref 1–4.8)
LYMPHOCYTES NFR BLD: 26.9 % (ref 22–41)
MCH RBC QN AUTO: 27.2 PG (ref 27–33)
MCHC RBC AUTO-ENTMCNC: 31.1 G/DL (ref 33.6–35)
MCV RBC AUTO: 87.6 FL (ref 81.4–97.8)
MONOCYTES # BLD AUTO: 0.56 K/UL (ref 0–0.85)
MONOCYTES NFR BLD AUTO: 5.6 % (ref 0–13.4)
NEUTROPHILS # BLD AUTO: 6.32 K/UL (ref 2–7.15)
NEUTROPHILS NFR BLD: 63.2 % (ref 44–72)
NRBC # BLD AUTO: 0 K/UL
NRBC BLD-RTO: 0 /100 WBC
PLATELET # BLD AUTO: 388 K/UL (ref 164–446)
PMV BLD AUTO: 9.7 FL (ref 9–12.9)
POTASSIUM SERPL-SCNC: 4 MMOL/L (ref 3.6–5.5)
PROT SERPL-MCNC: 7.9 G/DL (ref 6–8.2)
RBC # BLD AUTO: 5.47 M/UL (ref 4.2–5.4)
SODIUM SERPL-SCNC: 139 MMOL/L (ref 135–145)
T4 FREE SERPL-MCNC: 1.18 NG/DL (ref 0.93–1.7)
TRIGL SERPL-MCNC: 141 MG/DL (ref 0–149)
TSH SERPL DL<=0.005 MIU/L-ACNC: 2.65 UIU/ML (ref 0.38–5.33)
WBC # BLD AUTO: 10 K/UL (ref 4.8–10.8)

## 2022-11-18 PROCEDURE — 36415 COLL VENOUS BLD VENIPUNCTURE: CPT

## 2022-11-18 PROCEDURE — 80053 COMPREHEN METABOLIC PANEL: CPT

## 2022-11-18 PROCEDURE — 83036 HEMOGLOBIN GLYCOSYLATED A1C: CPT

## 2022-11-18 PROCEDURE — 85025 COMPLETE CBC W/AUTO DIFF WBC: CPT

## 2022-11-18 PROCEDURE — 80061 LIPID PANEL: CPT

## 2022-11-18 PROCEDURE — 84439 ASSAY OF FREE THYROXINE: CPT

## 2022-11-18 PROCEDURE — 84443 ASSAY THYROID STIM HORMONE: CPT

## 2022-11-30 ENCOUNTER — OFFICE VISIT (OUTPATIENT)
Dept: URGENT CARE | Facility: PHYSICIAN GROUP | Age: 67
End: 2022-11-30
Payer: MEDICARE

## 2022-11-30 VITALS
TEMPERATURE: 97.1 F | HEART RATE: 84 BPM | SYSTOLIC BLOOD PRESSURE: 130 MMHG | DIASTOLIC BLOOD PRESSURE: 74 MMHG | WEIGHT: 212.4 LBS | RESPIRATION RATE: 18 BRPM | HEIGHT: 63 IN | BODY MASS INDEX: 37.63 KG/M2 | OXYGEN SATURATION: 96 %

## 2022-11-30 DIAGNOSIS — R05.9 COUGH, UNSPECIFIED TYPE: ICD-10-CM

## 2022-11-30 DIAGNOSIS — E11.9 DIABETES MELLITUS WITH COINCIDENT HYPERTENSION (HCC): ICD-10-CM

## 2022-11-30 DIAGNOSIS — I10 DIABETES MELLITUS WITH COINCIDENT HYPERTENSION (HCC): ICD-10-CM

## 2022-11-30 DIAGNOSIS — R09.81 NASAL CONGESTION: ICD-10-CM

## 2022-11-30 DIAGNOSIS — J98.8 RTI (RESPIRATORY TRACT INFECTION): ICD-10-CM

## 2022-11-30 PROCEDURE — 99213 OFFICE O/P EST LOW 20 MIN: CPT | Performed by: STUDENT IN AN ORGANIZED HEALTH CARE EDUCATION/TRAINING PROGRAM

## 2022-11-30 RX ORDER — AZITHROMYCIN 250 MG/1
TABLET, FILM COATED ORAL
Qty: 6 TABLET | Refills: 0 | Status: SHIPPED | OUTPATIENT
Start: 2022-11-30 | End: 2023-01-10

## 2022-11-30 RX ORDER — BENZONATATE 100 MG/1
100 CAPSULE ORAL 3 TIMES DAILY PRN
Qty: 60 CAPSULE | Refills: 0 | Status: SHIPPED | OUTPATIENT
Start: 2022-11-30 | End: 2023-01-10

## 2022-11-30 RX ORDER — FLUTICASONE PROPIONATE 50 MCG
1 SPRAY, SUSPENSION (ML) NASAL DAILY
Qty: 16 G | Refills: 0 | Status: SHIPPED | OUTPATIENT
Start: 2022-11-30 | End: 2023-01-10

## 2022-11-30 ASSESSMENT — ENCOUNTER SYMPTOMS
CONSTIPATION: 0
HEADACHES: 0
SPUTUM PRODUCTION: 1
FEVER: 0
NECK PAIN: 0
MYALGIAS: 0
HEMOPTYSIS: 0
SHORTNESS OF BREATH: 0
WHEEZING: 1
ABDOMINAL PAIN: 0
NAUSEA: 0
DIARRHEA: 0
PALPITATIONS: 0
COUGH: 1
CHILLS: 1
VOMITING: 0
SORE THROAT: 1

## 2022-11-30 ASSESSMENT — FIBROSIS 4 INDEX: FIB4 SCORE: 0.64

## 2022-11-30 NOTE — LETTER
November 30, 2022    To Whom It May Concern:         This is confirmation that Zena Hui attended her scheduled appointment with Prema Phillips P.A.-C. on 11/30/22. Please excuse Zena from work today through 12/1/22 for medical reasons.         If you have any questions please do not hesitate to call me at the phone number listed below.    Sincerely,    Prema Phillips P.A.-C.  [phone]

## 2022-11-30 NOTE — PROGRESS NOTES
"Subjective     Ledy Hui is a 67 y.o. female who presents with Cough (3 days. With chills as well ), Sore Throat (7 days. ), and Other (Ear pain and stuffed. )            Cough  This is a new problem. Episode onset: 1 week ago. The problem has been unchanged. The cough is Productive of sputum. Associated symptoms include chills, ear congestion, ear pain, nasal congestion, postnasal drip, a sore throat and wheezing. Pertinent negatives include no chest pain, fever, headaches, hemoptysis, myalgias or shortness of breath. She has tried rest and OTC cough suppressant for the symptoms. Her past medical history is significant for bronchitis.   Otalgia   There is pain in both ears. This is a new problem. The current episode started yesterday. The problem has been unchanged. There has been no fever. The pain is mild. Associated symptoms include coughing and a sore throat. Pertinent negatives include no abdominal pain, diarrhea, ear discharge, headaches, hearing loss, neck pain or vomiting.     Review of Systems   Constitutional:  Positive for chills. Negative for fever and malaise/fatigue.   HENT:  Positive for congestion, ear pain, postnasal drip and sore throat. Negative for ear discharge and hearing loss.    Respiratory:  Positive for cough, sputum production and wheezing. Negative for hemoptysis and shortness of breath.    Cardiovascular:  Negative for chest pain and palpitations.   Gastrointestinal:  Negative for abdominal pain, constipation, diarrhea, nausea and vomiting.   Musculoskeletal:  Negative for myalgias and neck pain.   Neurological:  Negative for headaches.   All other systems reviewed and are negative.           Objective     /74 (BP Location: Left arm, Patient Position: Sitting, BP Cuff Size: Large adult)   Pulse 84   Temp 36.2 °C (97.1 °F) (Temporal)   Resp 18   Ht 1.6 m (5' 3\")   Wt 96.3 kg (212 lb 6.4 oz)   LMP 01/01/2009   SpO2 96%   BMI 37.62 kg/m²      Physical Exam  Vitals " reviewed.   Constitutional:       General: She is not in acute distress.     Appearance: Normal appearance. She is not ill-appearing or toxic-appearing.   HENT:      Head: Normocephalic and atraumatic.      Right Ear: Tympanic membrane, ear canal and external ear normal.      Left Ear: Tympanic membrane, ear canal and external ear normal.      Nose: Nose normal.      Mouth/Throat:      Mouth: Mucous membranes are moist.      Pharynx: Oropharynx is clear.   Eyes:      Extraocular Movements: Extraocular movements intact.      Conjunctiva/sclera: Conjunctivae normal.      Pupils: Pupils are equal, round, and reactive to light.   Cardiovascular:      Rate and Rhythm: Normal rate and regular rhythm.   Pulmonary:      Effort: Pulmonary effort is normal. No respiratory distress.      Breath sounds: Normal breath sounds. No stridor. No wheezing, rhonchi or rales.   Musculoskeletal:      Cervical back: Normal range of motion.   Skin:     General: Skin is warm and dry.   Neurological:      General: No focal deficit present.      Mental Status: She is alert. Mental status is at baseline.                           Assessment & Plan        1. RTI (respiratory tract infection)  - azithromycin (ZITHROMAX) 250 MG Tab; Take 2 tablets (500 mg) PO on day 1 and then take 1 tablet (250 mg) daily on 2-5  Dispense: 6 Tablet; Refill: 0    2. Nasal congestion  - fluticasone (FLONASE) 50 MCG/ACT nasal spray; Administer 1 Spray into affected nostril(S) every day.  Dispense: 16 g; Refill: 0    3. Cough, unspecified type  - benzonatate (TESSALON) 100 MG Cap; Take 1 Capsule by mouth 3 times a day as needed for Cough.  Dispense: 60 Capsule; Refill: 0    4. Diabetes mellitus with coincident hypertension (HCC)  BP in clinic today is 130/74.   A1C has decreased from 10.7 to 9.4  Reviewed medications with patient in clinic. Patient to continue as prescribed and follow up with PCP. Dietary/lifestyle modifications reviewed.    Given duration of  patient's symptoms and comorbidities of diabetes and hypertension, patient to be started on azithromycin. Patient educated on supportive care measures. Supportive measures may include:  Increase daily water intake and make sure getting adequate rest.OTC analgesics and antipyretics (i.e. NSAIDs and acetaminophen) can be used for pain and fever relief as needed. Mechanical saline irrigation and intranasal saline spray may temporarily improve nasal patency and loosen secretions. Inhalation of warm/humidified air (steam) may provide relief of symptoms.  Advised against use of decongestants due to essential hypertension. Coricidin can be used to treat headache and URI symptoms.    Differential diagnoses, supportive care, and indications for immediate follow-up discussed with patient. Pathogenesis of diagnosis discussed including typical length and natural progression.      Instructed to return to urgent care or nearest emergency department if symptoms fail to improve, for any change in condition, further concerns, or new concerning symptoms.    Patient states understanding and agrees with the plan of care and discharge instructions.

## 2022-12-11 SDOH — ECONOMIC STABILITY: INCOME INSECURITY: IN THE LAST 12 MONTHS, WAS THERE A TIME WHEN YOU WERE NOT ABLE TO PAY THE MORTGAGE OR RENT ON TIME?: NO

## 2022-12-11 SDOH — ECONOMIC STABILITY: FOOD INSECURITY: WITHIN THE PAST 12 MONTHS, THE FOOD YOU BOUGHT JUST DIDN'T LAST AND YOU DIDN'T HAVE MONEY TO GET MORE.: NEVER TRUE

## 2022-12-11 SDOH — ECONOMIC STABILITY: HOUSING INSECURITY
IN THE LAST 12 MONTHS, WAS THERE A TIME WHEN YOU DID NOT HAVE A STEADY PLACE TO SLEEP OR SLEPT IN A SHELTER (INCLUDING NOW)?: NO

## 2022-12-11 SDOH — ECONOMIC STABILITY: TRANSPORTATION INSECURITY
IN THE PAST 12 MONTHS, HAS THE LACK OF TRANSPORTATION KEPT YOU FROM MEDICAL APPOINTMENTS OR FROM GETTING MEDICATIONS?: NO

## 2022-12-11 SDOH — HEALTH STABILITY: PHYSICAL HEALTH: ON AVERAGE, HOW MANY MINUTES DO YOU ENGAGE IN EXERCISE AT THIS LEVEL?: 30 MIN

## 2022-12-11 SDOH — ECONOMIC STABILITY: INCOME INSECURITY: HOW HARD IS IT FOR YOU TO PAY FOR THE VERY BASICS LIKE FOOD, HOUSING, MEDICAL CARE, AND HEATING?: NOT HARD AT ALL

## 2022-12-11 SDOH — ECONOMIC STABILITY: HOUSING INSECURITY: IN THE LAST 12 MONTHS, HOW MANY PLACES HAVE YOU LIVED?: 1

## 2022-12-11 SDOH — ECONOMIC STABILITY: FOOD INSECURITY: WITHIN THE PAST 12 MONTHS, YOU WORRIED THAT YOUR FOOD WOULD RUN OUT BEFORE YOU GOT MONEY TO BUY MORE.: NEVER TRUE

## 2022-12-11 SDOH — ECONOMIC STABILITY: TRANSPORTATION INSECURITY
IN THE PAST 12 MONTHS, HAS LACK OF RELIABLE TRANSPORTATION KEPT YOU FROM MEDICAL APPOINTMENTS, MEETINGS, WORK OR FROM GETTING THINGS NEEDED FOR DAILY LIVING?: NO

## 2022-12-11 SDOH — HEALTH STABILITY: PHYSICAL HEALTH: ON AVERAGE, HOW MANY DAYS PER WEEK DO YOU ENGAGE IN MODERATE TO STRENUOUS EXERCISE (LIKE A BRISK WALK)?: 2 DAYS

## 2022-12-11 SDOH — ECONOMIC STABILITY: TRANSPORTATION INSECURITY
IN THE PAST 12 MONTHS, HAS LACK OF TRANSPORTATION KEPT YOU FROM MEETINGS, WORK, OR FROM GETTING THINGS NEEDED FOR DAILY LIVING?: NO

## 2022-12-11 SDOH — HEALTH STABILITY: MENTAL HEALTH
STRESS IS WHEN SOMEONE FEELS TENSE, NERVOUS, ANXIOUS, OR CAN'T SLEEP AT NIGHT BECAUSE THEIR MIND IS TROUBLED. HOW STRESSED ARE YOU?: NOT AT ALL

## 2022-12-11 ASSESSMENT — SOCIAL DETERMINANTS OF HEALTH (SDOH)
WITHIN THE PAST 12 MONTHS, YOU WORRIED THAT YOUR FOOD WOULD RUN OUT BEFORE YOU GOT THE MONEY TO BUY MORE: NEVER TRUE
DO YOU BELONG TO ANY CLUBS OR ORGANIZATIONS SUCH AS CHURCH GROUPS UNIONS, FRATERNAL OR ATHLETIC GROUPS, OR SCHOOL GROUPS?: NO
DO YOU BELONG TO ANY CLUBS OR ORGANIZATIONS SUCH AS CHURCH GROUPS UNIONS, FRATERNAL OR ATHLETIC GROUPS, OR SCHOOL GROUPS?: NO
HOW OFTEN DO YOU GET TOGETHER WITH FRIENDS OR RELATIVES?: ONCE A WEEK
HOW OFTEN DO YOU ATTEND CHURCH OR RELIGIOUS SERVICES?: NEVER
HOW OFTEN DO YOU ATTEND CHURCH OR RELIGIOUS SERVICES?: NEVER
HOW MANY DRINKS CONTAINING ALCOHOL DO YOU HAVE ON A TYPICAL DAY WHEN YOU ARE DRINKING: PATIENT DOES NOT DRINK
HOW OFTEN DO YOU HAVE SIX OR MORE DRINKS ON ONE OCCASION: NEVER
HOW HARD IS IT FOR YOU TO PAY FOR THE VERY BASICS LIKE FOOD, HOUSING, MEDICAL CARE, AND HEATING?: NOT HARD AT ALL
IN A TYPICAL WEEK, HOW MANY TIMES DO YOU TALK ON THE PHONE WITH FAMILY, FRIENDS, OR NEIGHBORS?: MORE THAN THREE TIMES A WEEK
HOW OFTEN DO YOU GET TOGETHER WITH FRIENDS OR RELATIVES?: ONCE A WEEK
IN A TYPICAL WEEK, HOW MANY TIMES DO YOU TALK ON THE PHONE WITH FAMILY, FRIENDS, OR NEIGHBORS?: MORE THAN THREE TIMES A WEEK
HOW OFTEN DO YOU ATTENT MEETINGS OF THE CLUB OR ORGANIZATION YOU BELONG TO?: NEVER
HOW OFTEN DO YOU ATTENT MEETINGS OF THE CLUB OR ORGANIZATION YOU BELONG TO?: NEVER
HOW OFTEN DO YOU HAVE A DRINK CONTAINING ALCOHOL: NEVER

## 2022-12-11 ASSESSMENT — LIFESTYLE VARIABLES
AUDIT-C TOTAL SCORE: 0
SKIP TO QUESTIONS 9-10: 1
HOW OFTEN DO YOU HAVE SIX OR MORE DRINKS ON ONE OCCASION: NEVER
HOW OFTEN DO YOU HAVE A DRINK CONTAINING ALCOHOL: NEVER
HOW MANY STANDARD DRINKS CONTAINING ALCOHOL DO YOU HAVE ON A TYPICAL DAY: PATIENT DOES NOT DRINK

## 2022-12-12 ENCOUNTER — APPOINTMENT (OUTPATIENT)
Dept: INTERNAL MEDICINE | Facility: OTHER | Age: 67
End: 2022-12-12
Payer: MEDICARE

## 2022-12-13 ENCOUNTER — NON-PROVIDER VISIT (OUTPATIENT)
Dept: INTERNAL MEDICINE | Facility: OTHER | Age: 67
End: 2022-12-13
Payer: MEDICARE

## 2022-12-13 VITALS — WEIGHT: 205 LBS | HEIGHT: 63 IN | BODY MASS INDEX: 36.32 KG/M2

## 2022-12-13 DIAGNOSIS — E11.9 DIABETES MELLITUS WITH COINCIDENT HYPERTENSION (HCC): ICD-10-CM

## 2022-12-13 DIAGNOSIS — E78.5 HYPERLIPIDEMIA ASSOCIATED WITH TYPE 2 DIABETES MELLITUS (HCC): ICD-10-CM

## 2022-12-13 DIAGNOSIS — R80.9 MICROALBUMINURIA DUE TO TYPE 2 DIABETES MELLITUS (HCC): ICD-10-CM

## 2022-12-13 DIAGNOSIS — I10 ESSENTIAL HYPERTENSION: ICD-10-CM

## 2022-12-13 DIAGNOSIS — E11.65 UNCONTROLLED TYPE 2 DIABETES MELLITUS WITH HYPERGLYCEMIA (HCC): ICD-10-CM

## 2022-12-13 DIAGNOSIS — E11.29 MICROALBUMINURIA DUE TO TYPE 2 DIABETES MELLITUS (HCC): ICD-10-CM

## 2022-12-13 DIAGNOSIS — Z79.4 LONG-TERM INSULIN USE (HCC): ICD-10-CM

## 2022-12-13 DIAGNOSIS — E66.9 OBESITY (BMI 30-39.9): ICD-10-CM

## 2022-12-13 DIAGNOSIS — E11.69 HYPERLIPIDEMIA ASSOCIATED WITH TYPE 2 DIABETES MELLITUS (HCC): ICD-10-CM

## 2022-12-13 DIAGNOSIS — E78.00 ELEVATED CHOLESTEROL: ICD-10-CM

## 2022-12-13 DIAGNOSIS — E78.5 DYSLIPIDEMIA: ICD-10-CM

## 2022-12-13 DIAGNOSIS — I10 DIABETES MELLITUS WITH COINCIDENT HYPERTENSION (HCC): ICD-10-CM

## 2022-12-13 PROCEDURE — 97802 MEDICAL NUTRITION INDIV IN: CPT | Mod: GE | Performed by: DIETITIAN, REGISTERED

## 2022-12-13 ASSESSMENT — FIBROSIS 4 INDEX: FIB4 SCORE: 0.64

## 2022-12-13 NOTE — PROGRESS NOTES
Virtual Visit: New Patient   This visit was conducted via Zoom using secure and encrypted videoconferencing technology.   The patient was in their home in the state of [place].    The patient's identity was confirmed and verbal consent was obtained for this virtual visit.     Subjective:     Zena Hui is a 67 y.o. female presenting to [facility] care and for initial nutrition assessment for T2DM; Ledy reports she has been sick x 6 weeks- bronchitis and +COVID    ROS: h/o taking Jardiance with recurring UTI's, has not started Farxiga Rx by endocrinologist    Current medicines (including changes today)  Current Outpatient Medications   Medication Sig Dispense Refill    fluticasone (FLONASE) 50 MCG/ACT nasal spray Administer 1 Spray into affected nostril(S) every day. 16 g 0    benzonatate (TESSALON) 100 MG Cap Take 1 Capsule by mouth 3 times a day as needed for Cough. 60 Capsule 0    azithromycin (ZITHROMAX) 250 MG Tab Take 2 tablets (500 mg) PO on day 1 and then take 1 tablet (250 mg) daily on 2-5 6 Tablet 0    Dapagliflozin Propanediol (FARXIGA) 5 MG Tab Take 1 Tablet by mouth every morning with breakfast. 90 Tablet 1    Dulaglutide (TRULICITY) 4.5 MG/0.5ML Solution Pen-injector Inject 4.5 mg under the skin every 7 days. 2.0ml equals 4 pens per month 6 mL 2    metformin (GLUCOPHAGE) 1000 MG tablet Take 1 Tablet by mouth 2 times a day with meals. 180 Tablet 2    insulin glargine (LANTUS SOLOSTAR) 100 UNIT/ML Solution Pen-injector injection Inject 40 Units under the skin every evening. (Patient taking differently: Inject 42 Units under the skin every evening.) 45 mL 2    glimepiride (AMARYL) 4 MG Tab Take 1 Tablet by mouth 2 times a day. 200 Tablet 2    Telmisartan-amLODIPine 80-5 MG Tab Take  by mouth.      rosuvastatin (CRESTOR) 20 MG Tab TAKE 1 TABLET BY MOUTH DAILY      aspirin 81 MG tablet Take 81 mg by mouth every day.       No current facility-administered medications for this visit.       Patient  "Active Problem List    Diagnosis Date Noted    BMI 39.0-39.9,adult 2022    Diabetes mellitus with coincident hypertension (HCC) 2022    Hyperlipidemia associated with type 2 diabetes mellitus ([facility]) 2022    Uncontrolled type 2 diabetes mellitus with hyperglycemia ([facility]) 2022    Microalbuminuria due to type 2 diabetes mellitus ([facility]) 2022    Dyslipidemia 2022    Long-term insulin use ([facility]) 2022    Morbid obesity ([facility]) 2021    Sedative, hypnotic, or anxiolytic dependence ([facility]) 2021    Obesity (BMI 30-39.9) 2019    Type 2 diabetes mellitus with hyperglycemia, with long-term current use of insulin ([facility]) 2012    Vitamin d deficiency 2010    Elevated cholesterol 2009    Essential hypertension 2009    Osteopenia 2009    JASPER on CPAP 2009        Objective:     Physical Exam: none    Assessment and Plan:     Wellness Vision:  I want to see one of my grandkids graduate from high school. Retired 1.5 years ago, have plans! Father  early from heart disease and mom smoked like a chimney.    My Health Profile:    PHYSICAL ASSESSMENT  Current Height:  63\"  Ht Readings from Last 1 Encounters:   22 1.6 m (5' 3\")     Current Weight:  205#  Wt Readings from Last 1 Encounters:   22 96.3 kg (212 lb 6.4 oz)     BMI:  36  HABW: 255#  Preferred BW: 170#  Has been stuck between 205-220 lbs    FLUID INTAKE:  water, iced tea-unsweetened- all day long; hot tea+1 tsp sugar  Typical Beverages: water  Servings Alcohol/D/WK/MO: none  Tobacco/Substance: none    ACTIVITY REVIEW:   Current Exercise: sporadic with treadmill- mainly ADL  Hobbies: bake    PERTINENT LABS:      Latest Reference Range & Units 10/14/21 08:54 22 07:30 22 06:53 08/15/22 09:56 22 06:18   Glycohemoglobin 4.0 - 5.6 % 10.4 (H) 10.5 (H) 10.0 (H) 9.7 ! 9.4 (H)   (H): Data is abnormally high  !: Data is abnormal  Patient Behaviors (indicate " "frequency)  Meal/Snack Pattern: when good/when bad    B: 1 egg/1 eggwhite, oatmeal or Cheerios+milk  L: leftovers  D: meat- chicken or beef,  does not like fish; potatoes/rice, salad/veggies  S: nuts, pretzels, veggie cheese, hot tea; Activia Yogurt +BB    Breakfast tends to be \"iffy\" but knows this throws off her entire day  Loves Chocolate chip cookies- can eat the whole box  L: leftovers  D: chicken veg soup- d/t all sick    Sweets: chocolate craving- gives up chocolate during Lent- physically feels going through withdrawls; can have a whole bowl of jelly beans during Lent- can eat one at a time, but once 40 days over- look out!    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; cooks/shops-self  Additional Patient Behavior Information: loves to cook and bake    PES: Obesity II, uncontrolled T2DM r/t high frequency in added sugars, excessive portions/snacking and high energy calorie intake with nutrition knowledge deficit/awareness as evidenced by BMI 36, A1c 9.4%    NUTRITION COMMENTS:    Ledy reports she has always been the short/chubby one  Ledy feels recent weight loss has been from illness  Maintaining weight that has been loss is imperative to Ledy Villafana tests her FBG every mornin mg/dl today; 130-170 mg/dl on average  Desires a CGM for BG awareness    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 Feels \"food addicted\"- eats what she makes for others, but doesn't eat much at dinner, eats throughout her cooking time- snacking and tasting while cooking.    With illness, realized couldn't taste the food.Made it more difficult to take in the calories she would normally  Feels she cooks lupia " filled with veggies/meats; stir-golden w/high amounts of veggies  Thanksgiving: 3-different potatoes, turkey, ham, cranberries, sweet potatoes, stuffing and giblet gravy, apple tart, [employer] pumpkin pie    Ledy's love language is her cooking; enjoys and brings her much anahy.    Ledy cooks mostly whole foods, little processed foods which is a strength.  Ledy ready to take on new mindset in her weight and diabetes self management.     Bring new understanding to her BG throughout the day.    Begin DSMES.        RTC x next available    Time Spent: 60 minutes

## 2022-12-13 NOTE — PATIENT INSTRUCTIONS
I will start making new recipes with mediterranean focused emphasis or healthier options  - dehydrated fruit roll ups made with natural fruit  - prep and plan more  - healthier charcuterie board with color and more veggies/- have grandkids help!    I will contact Endo clinic to obtain Christina Sensor for BG awareness

## 2023-01-06 ENCOUNTER — HOSPITAL ENCOUNTER (OUTPATIENT)
Dept: LAB | Facility: MEDICAL CENTER | Age: 68
End: 2023-01-06
Attending: INTERNAL MEDICINE
Payer: COMMERCIAL

## 2023-01-06 DIAGNOSIS — Z79.4 LONG-TERM INSULIN USE (HCC): ICD-10-CM

## 2023-01-06 DIAGNOSIS — E78.5 DYSLIPIDEMIA: ICD-10-CM

## 2023-01-06 DIAGNOSIS — I10 ESSENTIAL HYPERTENSION: ICD-10-CM

## 2023-01-06 DIAGNOSIS — E11.65 TYPE 2 DIABETES MELLITUS WITH HYPERGLYCEMIA, WITH LONG-TERM CURRENT USE OF INSULIN (HCC): ICD-10-CM

## 2023-01-06 DIAGNOSIS — Z79.4 TYPE 2 DIABETES MELLITUS WITH HYPERGLYCEMIA, WITH LONG-TERM CURRENT USE OF INSULIN (HCC): ICD-10-CM

## 2023-01-06 LAB
ALBUMIN SERPL BCP-MCNC: 4.4 G/DL (ref 3.2–4.9)
ALBUMIN/GLOB SERPL: 1.4 G/DL
ALP SERPL-CCNC: 132 U/L (ref 30–99)
ALT SERPL-CCNC: 18 U/L (ref 2–50)
ANION GAP SERPL CALC-SCNC: 13 MMOL/L (ref 7–16)
AST SERPL-CCNC: 15 U/L (ref 12–45)
BILIRUB SERPL-MCNC: 0.5 MG/DL (ref 0.1–1.5)
BUN SERPL-MCNC: 12 MG/DL (ref 8–22)
CALCIUM ALBUM COR SERPL-MCNC: 9.2 MG/DL (ref 8.5–10.5)
CALCIUM SERPL-MCNC: 9.5 MG/DL (ref 8.5–10.5)
CHLORIDE SERPL-SCNC: 101 MMOL/L (ref 96–112)
CO2 SERPL-SCNC: 24 MMOL/L (ref 20–33)
CREAT SERPL-MCNC: 0.6 MG/DL (ref 0.5–1.4)
CREAT UR-MCNC: 140.19 MG/DL
GFR SERPLBLD CREATININE-BSD FMLA CKD-EPI: 98 ML/MIN/1.73 M 2
GLOBULIN SER CALC-MCNC: 3.2 G/DL (ref 1.9–3.5)
GLUCOSE SERPL-MCNC: 158 MG/DL (ref 65–99)
MICROALBUMIN UR-MCNC: 66 MG/DL
MICROALBUMIN/CREAT UR: 471 MG/G (ref 0–30)
POTASSIUM SERPL-SCNC: 3.5 MMOL/L (ref 3.6–5.5)
PROT SERPL-MCNC: 7.6 G/DL (ref 6–8.2)
SODIUM SERPL-SCNC: 138 MMOL/L (ref 135–145)

## 2023-01-06 PROCEDURE — 80053 COMPREHEN METABOLIC PANEL: CPT

## 2023-01-06 PROCEDURE — 82570 ASSAY OF URINE CREATININE: CPT

## 2023-01-06 PROCEDURE — 82043 UR ALBUMIN QUANTITATIVE: CPT

## 2023-01-06 PROCEDURE — 36415 COLL VENOUS BLD VENIPUNCTURE: CPT

## 2023-01-10 ENCOUNTER — NON-PROVIDER VISIT (OUTPATIENT)
Dept: ENDOCRINOLOGY | Facility: MEDICAL CENTER | Age: 68
End: 2023-01-10
Attending: INTERNAL MEDICINE
Payer: MEDICARE

## 2023-01-10 VITALS
HEART RATE: 84 BPM | HEIGHT: 63 IN | WEIGHT: 214 LBS | DIASTOLIC BLOOD PRESSURE: 70 MMHG | SYSTOLIC BLOOD PRESSURE: 122 MMHG | OXYGEN SATURATION: 97 % | BODY MASS INDEX: 37.92 KG/M2

## 2023-01-10 DIAGNOSIS — E11.65 TYPE 2 DIABETES MELLITUS WITH HYPERGLYCEMIA, WITH LONG-TERM CURRENT USE OF INSULIN (HCC): ICD-10-CM

## 2023-01-10 DIAGNOSIS — Z79.4 TYPE 2 DIABETES MELLITUS WITH HYPERGLYCEMIA, WITH LONG-TERM CURRENT USE OF INSULIN (HCC): ICD-10-CM

## 2023-01-10 LAB — HBA1C MFR BLD: 9.2 % (ref 0–5.6)

## 2023-01-10 PROCEDURE — 99212 OFFICE O/P EST SF 10 MIN: CPT | Performed by: INTERNAL MEDICINE

## 2023-01-10 RX ORDER — INSULIN GLARGINE 300 U/ML
50 INJECTION, SOLUTION SUBCUTANEOUS DAILY
Qty: 13.5 ML | Refills: 3 | Status: SHIPPED | OUTPATIENT
Start: 2023-01-10 | End: 2023-02-06 | Stop reason: SDUPTHER

## 2023-01-10 ASSESSMENT — FIBROSIS 4 INDEX: FIB4 SCORE: 0.61

## 2023-01-10 NOTE — PROGRESS NOTES
RN-CDE Note    Subjective:   Endocrinology Clinic Progress Note  PCP: Link Polanco III, M.D.    HPI:  Zena Hui is a 67 y.o. old patient who is seen today by the Diabetes Nurse Specialist for review of Type 2 Diabetes.  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  DM:   Last A1c:   Lab Results   Component Value Date/Time    HBA1C 9.2 (A) 01/10/2023 12:00 AM      Previous A1c was 9.4 on 11/18/22  A1C GOAL: < 7    Diabetes Medications:   Lantus 40 units daily  Farxiga 5 mg daily  Trulicity 4.5 mg weekly  Metformin 1000mg BID  Glimepiride 4 mg daily      Exercise: Walking at  and on the treadmill  Diet: Eating better  Patient's body mass index is unknown because there is no height or weight on file. Exercise and nutrition counseling were performed at this visit.    Glucose monitoring frequency: Testing 4 times daily and is interested in going on the Stylesight when she gets her new insurance.  100-200's  Hypoglycemic episodes: no  Last Retinal Exam:  October 2022 with Dr. Schafer's  Daily Foot Exam: Yes   Foot Exam:  Monofilament: done  Monofilament testing with a 10 gram force: sensation intact: intact bilaterally  Visual Inspection: Feet without maceration, ulcers, fissures.  Some edema in her left leg.  She had an ultrasound and she does not have a blood clot..  Pedal pulses: intact bilaterally     Lab Results   Component Value Date/Time    MALBCRT 471 (H) 01/06/2023 07:12 AM    MICROALBUR 66.0 01/06/2023 07:12 AM        ACR Albumin/Creatinine Ratio goal <30     HTN:   Blood pressure goal <130/<80 .   Currently Rx ACE/ARB: Yes     Dyslipidemia:    Lab Results   Component Value Date/Time    CHOLSTRLTOT 150 11/18/2022 06:18 AM    LDL 64 11/18/2022 06:18 AM    HDL 58 11/18/2022 06:18 AM    TRIGLYCERIDE 141 11/18/2022 06:18 AM         Currently Rx Statin: Yes       She  reports that she has never smoked. She has never used smokeless tobacco.      Plan:      Discussed and educated on:   - All medications, side effects and compliance (discussed carefully)  - Annual eye examinations at Ophthalmology  - Home glucose monitoring emphasized  - Weight control and daily exercise    Recommended medication changes: She does not want to take the Jardiance or Farxiga due to constant yeast infections.  She will switch from Lantus to Tooujeo for insurance formulary.She will increase her Toujeo to 44 units.  Continue Trulicity 4.5 mg weekly, Metformin 4 mg daily, and Glimepiride 4 mg daily.  Follow up with Dr. Timmons in 3 months.

## 2023-01-16 DIAGNOSIS — E11.65 TYPE 2 DIABETES MELLITUS WITH HYPERGLYCEMIA, WITH LONG-TERM CURRENT USE OF INSULIN (HCC): ICD-10-CM

## 2023-01-16 DIAGNOSIS — Z79.4 TYPE 2 DIABETES MELLITUS WITH HYPERGLYCEMIA, WITH LONG-TERM CURRENT USE OF INSULIN (HCC): ICD-10-CM

## 2023-02-06 DIAGNOSIS — E11.65 TYPE 2 DIABETES MELLITUS WITH HYPERGLYCEMIA, WITH LONG-TERM CURRENT USE OF INSULIN (HCC): ICD-10-CM

## 2023-02-06 DIAGNOSIS — Z79.4 TYPE 2 DIABETES MELLITUS WITH HYPERGLYCEMIA, WITH LONG-TERM CURRENT USE OF INSULIN (HCC): ICD-10-CM

## 2023-02-06 RX ORDER — INSULIN GLARGINE 300 U/ML
50 INJECTION, SOLUTION SUBCUTANEOUS DAILY
Qty: 15 ML | Refills: 3 | Status: SHIPPED | OUTPATIENT
Start: 2023-02-06 | End: 2023-02-28

## 2023-02-06 RX ORDER — INSULIN GLARGINE 300 U/ML
50 INJECTION, SOLUTION SUBCUTANEOUS DAILY
Qty: 15 ML | Refills: 3 | Status: SHIPPED | OUTPATIENT
Start: 2023-02-06 | End: 2024-03-11

## 2023-02-24 ENCOUNTER — TELEPHONE (OUTPATIENT)
Dept: HEALTH INFORMATION MANAGEMENT | Facility: OTHER | Age: 68
End: 2023-02-24
Payer: MEDICARE

## 2023-02-28 ENCOUNTER — OFFICE VISIT (OUTPATIENT)
Dept: MEDICAL GROUP | Facility: PHYSICIAN GROUP | Age: 68
End: 2023-02-28
Payer: COMMERCIAL

## 2023-02-28 VITALS
OXYGEN SATURATION: 96 % | TEMPERATURE: 98.2 F | DIASTOLIC BLOOD PRESSURE: 74 MMHG | HEART RATE: 82 BPM | HEIGHT: 63 IN | WEIGHT: 214 LBS | BODY MASS INDEX: 37.92 KG/M2 | RESPIRATION RATE: 18 BRPM | SYSTOLIC BLOOD PRESSURE: 128 MMHG

## 2023-02-28 DIAGNOSIS — Z79.4 TYPE 2 DIABETES MELLITUS WITH MICROALBUMINURIA, WITH LONG-TERM CURRENT USE OF INSULIN (HCC): ICD-10-CM

## 2023-02-28 DIAGNOSIS — E11.29 TYPE 2 DIABETES MELLITUS WITH MICROALBUMINURIA, WITH LONG-TERM CURRENT USE OF INSULIN (HCC): ICD-10-CM

## 2023-02-28 DIAGNOSIS — I10 ESSENTIAL HYPERTENSION: ICD-10-CM

## 2023-02-28 DIAGNOSIS — E11.65 TYPE 2 DIABETES MELLITUS WITH HYPERGLYCEMIA, WITH LONG-TERM CURRENT USE OF INSULIN (HCC): ICD-10-CM

## 2023-02-28 DIAGNOSIS — E11.65 UNCONTROLLED TYPE 2 DIABETES MELLITUS WITH HYPERGLYCEMIA (HCC): ICD-10-CM

## 2023-02-28 DIAGNOSIS — R80.9 TYPE 2 DIABETES MELLITUS WITH MICROALBUMINURIA, WITH LONG-TERM CURRENT USE OF INSULIN (HCC): ICD-10-CM

## 2023-02-28 DIAGNOSIS — Z79.4 TYPE 2 DIABETES MELLITUS WITH HYPERGLYCEMIA, WITH LONG-TERM CURRENT USE OF INSULIN (HCC): ICD-10-CM

## 2023-02-28 DIAGNOSIS — Z76.89 ENCOUNTER TO ESTABLISH CARE: ICD-10-CM

## 2023-02-28 PROCEDURE — 99214 OFFICE O/P EST MOD 30 MIN: CPT | Performed by: FAMILY MEDICINE

## 2023-02-28 ASSESSMENT — FIBROSIS 4 INDEX: FIB4 SCORE: 0.61

## 2023-02-28 ASSESSMENT — PATIENT HEALTH QUESTIONNAIRE - PHQ9: CLINICAL INTERPRETATION OF PHQ2 SCORE: 0

## 2023-02-28 NOTE — ASSESSMENT & PLAN NOTE
This is a chronic problem.  She is managed by her endocrinologist.  I did talk to her about the different medicine she is on and if she is able to get her hemoglobin A1c is lower she might be able to reduce some of her other medications.  She will discuss it with Dr. Beyer.

## 2023-02-28 NOTE — ASSESSMENT & PLAN NOTE
Patient is here today to establish care.  Her labs are all current.  Her main problems are her diabetes.  She is under the care of Dr. Beyer.  She recently got the [employer]ut[employer] system and is finding it very useful.  She is due to see him again in May.  Her last mammogram was a year and a half ago.  The new guidelines suggest every 2 years so she will be due in October.  I did offer her to do it now and she wants to wait until October.  The chart does not have evidence of her pneumonia vaccine after age 65 but she thought she got one safely last year.  She will check with them before we do it here.

## 2023-02-28 NOTE — PROGRESS NOTES
Subjective:     CC: Here to establish care and go over her health history.  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    HPI:   Zena presents today with the following medical concerns:    Encounter to establish care  Patient is here today to establish care.  Her labs are all current.  Her main problems are her diabetes.  She is under the care of Dr. Beyer.  She recently got the [employer] system and is finding it very useful.  She is due to see him again in May.  Her last mammogram was a year and a half ago.  The new guidelines suggest every 2 years so she will be due in October.  I did offer her to do it now and she wants to wait until October.  The chart does not have evidence of her pneumonia vaccine after age 65 but she thought she got one safely last year.  She will check with them before we do it here.    Type 2 diabetes mellitus with microalbuminuria, with long-term current use of insulin (HCC)  This is a chronic problem.  Patient has been managed by endocrinology.    Type 2 diabetes mellitus with hyperglycemia, with long-term current use of insulin (HCC)  This is a chronic problem.  She is managed by her endocrinologist.  I did talk to her about the different medicine she is on and if she is able to get her hemoglobin A1c is lower she might be able to reduce some of her other medications.  She will discuss it with Dr. Beyer.    Essential hypertension  This is a chronic problem under good control.    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    Bronchitis     Essential hypertension, benign     High cholesterol     Hyperlipidemia     Mixed hyperlipidemia     Organic sleep apnea, unspecified     Osteopenia     Renal disorder 04/2019    kidney stones    Sleep apnea     Type II or unspecified type diabetes mellitus without mention of complication, not stated as  "uncontrolled     oral meds       Social History     Tobacco Use    Smoking status: Never    Smokeless tobacco: Never   Vaping Use    Vaping Use: Never used   Substance Use Topics    Alcohol use: Never    Drug use: Never       Current Outpatient Medications Ordered in Epic   Medication Sig Dispense Refill    Insulin Glargine, 1 Unit Dial, (TOUJEO SOLOSTAR) 300 UNIT/ML Solution Pen-injector Inject 50 Units under the skin every day. 15 mL 3    Continuous Blood Gluc  (FREESTYLE ETELVINA 2 READER) Device 1 Each continuous. 1 Each 0    Continuous Blood Gluc Sensor (FREESTYLE ETELVINA 2 SENSOR) Misc 1 Each every 14 days. 6 Each 3    Multiple Vitamin (MULTIVITAMIN ADULT PO) Take  by mouth.      Dulaglutide (TRULICITY) 4.5 MG/0.5ML Solution Pen-injector Inject 4.5 mg under the skin every 7 days. 2.0ml equals 4 pens per month 6 mL 2    metformin (GLUCOPHAGE) 1000 MG tablet Take 1 Tablet by mouth 2 times a day with meals. 180 Tablet 2    glimepiride (AMARYL) 4 MG Tab Take 1 Tablet by mouth 2 times a day. 200 Tablet 2    Telmisartan-amLODIPine 80-5 MG Tab Take  by mouth.      rosuvastatin (CRESTOR) 20 MG Tab TAKE 1 TABLET BY MOUTH DAILY      aspirin 81 MG tablet Take 81 mg by mouth every day.       No current [facility]-ordered facility-administered medications on file.       Allergies:  Amoxicillin    Health Maintenance: Completed    ROS:  Gen: no fevers/chills, no changes in weight  Eyes: no changes in vision  ENT: no sore throat, no hearing loss, no bloody nose  Pulm: no sob, no cough  CV: no chest pain, no palpitations  GI: no nausea/vomiting, no diarrhea  : no dysuria  MSk: no myalgias  Skin: no rash  Neuro: no headaches, no numbness/tingling  Heme/Lymph: no easy bruising      Objective:       Exam:  /74 (BP Location: Left arm, Patient Position: Sitting, BP Cuff Size: Adult)   Pulse 82   Temp 36.8 °C (98.2 °F) (Temporal)   Resp 18   Ht 1.6 m (5' 3\")   Wt 97.1 kg (214 lb)   LMP 01/01/2009   SpO2 96%   BMI 37.91 " kg/m²  Body mass index is 37.91 kg/m².    Gen: Alert and oriented, No apparent distress.  ENT:    Ear canals and TMs are clear.  Throat is clear.  Nose has mild congestion without drainage.  Neck: Neck is supple without lymphadenopathy.  Lungs: Normal effort, CTA bilaterally, no wheezes, rhonchi, or rales  CV: Regular rate and rhythm. No murmurs, rubs, or gallops.  Abdomen: Soft, nontender, no again megaly or masses.  Normal bowel sounds.  Ext: No clubbing, cyanosis, edema.  Neuro: Cranial nerves II through VIII are grossly intact.  No lateralizing signs are seen.      Labs: Reviewed    Assessment & Plan:     67 y.o. female with the following -     1. Encounter to establish care  Patient establish care with me today we went over her extensive health care history and surgeries.  Continue to follow.    2. Type 2 diabetes mellitus with microalbuminuria, with long-term current use of insulin (HCC)  This is a chronic problem.  Continue care through the endocrinologist.    3. Uncontrolled type 2 diabetes mellitus with hyperglycemia (HCC)  This is a chronic problem.  We discussed improving her hemoglobin A1c and and it appears that her tom system will be very beneficial in that.    4. Type 2 diabetes mellitus with hyperglycemia, with long-term current use of insulin (HCC)  This is a chronic problem.  Continue care through endocrinology.    5. Essential hypertension  This is a chronic problem under good control.  Continue to follow.      Return in about 6 months (around 8/28/2023) for Long.    Please note that this dictation was created using voice recognition software. I have made every reasonable attempt to correct obvious errors, but I expect that there are errors of grammar and possibly content that I did not discover before finalizing the note.

## 2023-02-28 NOTE — LETTER
[facility]  Link Polanco III, M.D.  [address]  [place] [address]  Fax: [phone]   Authorization for Release/Disclosure of   Protected Health Information   Name: JUVENAL RUIZ : 1955 SSN: [id number]   Address: [facility] [place] [address] Phone:    [phone] (home)    I authorize the entity listed below to release/disclose the PHI below to:   [facility]/Link Polanco III, M.D. and Link Polanco III, M.D.   Provider or Entity Name:  [facility]    Address   City, State, Zip  [address] [place] [address] Phone:      Fax:     Reason for request: continuity of care   Information to be released:    [  ] LAST COLONOSCOPY,  including any PATH REPORT and follow-up  [  ] LAST FIT/COLOGUARD RESULT [  ] LAST DEXA  [  ] LAST MAMMOGRAM  [  ] LAST PAP  [  ] LAST LABS [ XXX] RETINA EXAM REPORT  [  ] IMMUNIZATION RECORDS  [  ] Release all info      [  ] Check here and initial the line next to each item to release ALL health information INCLUDING  _____ Care and treatment for drug and / or alcohol abuse  _____ HIV testing, infection status, or AIDS  _____ Genetic Testing    DATES OF SERVICE OR TIME PERIOD TO BE DISCLOSED: _____________  I understand and acknowledge that:  * This Authorization may be revoked at any time by you in writing, except if your health information has already been used or disclosed.  * Your health information that will be used or disclosed as a result of you signing this authorization could be re-disclosed by the recipient. If this occurs, your re-disclosed health information may no longer be protected by State or Federal laws.  * You may refuse to sign this Authorization. Your refusal will not affect your ability to obtain treatment.  * This Authorization becomes effective upon signing and will  on (date) __________.      If no date is indicated, this Authorization will  one (1) year from the signature date.    Name: Juvenal  Radha Hui  Signature: Date:   2/28/2023     PLEASE FAX REQUESTED RECORDS BACK TO: [phone]

## 2023-02-28 NOTE — LETTER
[facility]  Link Polanco III, M.D.  [address]  [place] [address]  Fax: [phone]   Authorization for Release/Disclosure of   Protected Health Information   Name: JUVENAL HUI : 1955 SSN: [id number]   Address: [facility] [place] [address] Phone:    [phone] (home)    I authorize the entity listed below to release/disclose the PHI below to:   [facility]/Link Polanco III, M.D. and Link Polanco III, M.D.   Provider or Entity Name:  [facility]    Address   City, State, Zip   Phone:      Fax:     Reason for request: continuity of care   Information to be released:    [  ] LAST COLONOSCOPY,  including any PATH REPORT and follow-up  [  ] LAST FIT/COLOGUARD RESULT [  ] LAST DEXA  [  ] LAST MAMMOGRAM  [  ] LAST PAP  [  ] LAST LABS [  ] RETINA EXAM REPORT  [  ] IMMUNIZATION RECORDS  [  ] Release all info      [  ] Check here and initial the line next to each item to release ALL health information INCLUDING  _____ Care and treatment for drug and / or alcohol abuse  _____ HIV testing, infection status, or AIDS  _____ Genetic Testing    DATES OF SERVICE OR TIME PERIOD TO BE DISCLOSED: _____________  I understand and acknowledge that:  * This Authorization may be revoked at any time by you in writing, except if your health information has already been used or disclosed.  * Your health information that will be used or disclosed as a result of you signing this authorization could be re-disclosed by the recipient. If this occurs, your re-disclosed health information may no longer be protected by State or Federal laws.  * You may refuse to sign this Authorization. Your refusal will not affect your ability to obtain treatment.  * This Authorization becomes effective upon signing and will  on (date) __________.      If no date is indicated, this Authorization will  one (1) year from the signature date.    Name: Juvenal Hui  Signature: Date:   2023      PLEASE FAX REQUESTED RECORDS BACK TO: [phone]

## 2023-02-28 NOTE — LETTER
[facility]  Link Polanco III, M.D.  [address]  [place] [address]  Fax: [phone]   Authorization for Release/Disclosure of   Protected Health Information   Name: JUVENAL RUIZ : 1955 SSN: [id number]   Address: [facility] [place] [address] Phone:    [phone] (home)    I authorize the entity listed below to release/disclose the PHI below to:   [facility]/Link Polanco III, M.D. and Link Polanco III, M.D.   Provider or Entity Name:  [facility]    Address   City, State, Zip  [address] [place] [address] Phone:  [phone]    Fax:  [phone]   Reason for request: continuity of care   Information to be released:    [  ] LAST COLONOSCOPY,  including any PATH REPORT and follow-up  [  ] LAST FIT/COLOGUARD RESULT [  ] LAST DEXA  [  ] LAST MAMMOGRAM  [  ] LAST PAP  [  ] LAST LABS [ XXX] RETINA EXAM REPORT  [  ] IMMUNIZATION RECORDS  [  ] Release all info      [  ] Check here and initial the line next to each item to release ALL health information INCLUDING  _____ Care and treatment for drug and / or alcohol abuse  _____ HIV testing, infection status, or AIDS  _____ Genetic Testing    DATES OF SERVICE OR TIME PERIOD TO BE DISCLOSED: _____________  I understand and acknowledge that:  * This Authorization may be revoked at any time by you in writing, except if your health information has already been used or disclosed.  * Your health information that will be used or disclosed as a result of you signing this authorization could be re-disclosed by the recipient. If this occurs, your re-disclosed health information may no longer be protected by State or Federal laws.  * You may refuse to sign this Authorization. Your refusal will not affect your ability to obtain treatment.  * This Authorization becomes effective upon signing and will  on (date) __________.      If no date is indicated, this Authorization will  one (1) year from the signature  date.    Name: Zena Garcia Ez  Signature: Date:   2/28/2023     PLEASE FAX REQUESTED RECORDS BACK TO: [phone]

## 2023-03-02 ENCOUNTER — PATIENT MESSAGE (OUTPATIENT)
Dept: MEDICAL GROUP | Facility: PHYSICIAN GROUP | Age: 68
End: 2023-03-02
Payer: MEDICARE

## 2023-03-02 RX ORDER — ROSUVASTATIN CALCIUM 20 MG/1
20 TABLET, COATED ORAL DAILY
Qty: 100 TABLET | Refills: 3 | Status: SHIPPED | OUTPATIENT
Start: 2023-03-02 | End: 2024-03-21

## 2023-03-17 DIAGNOSIS — E11.65 TYPE 2 DIABETES MELLITUS WITH HYPERGLYCEMIA, WITH LONG-TERM CURRENT USE OF INSULIN (HCC): ICD-10-CM

## 2023-03-17 DIAGNOSIS — Z79.4 TYPE 2 DIABETES MELLITUS WITH HYPERGLYCEMIA, WITH LONG-TERM CURRENT USE OF INSULIN (HCC): ICD-10-CM

## 2023-03-17 DIAGNOSIS — F41.9 ANXIETY: ICD-10-CM

## 2023-03-17 RX ORDER — ALPRAZOLAM 0.5 MG/1
0.5 TABLET ORAL 3 TIMES DAILY PRN
Qty: 4 TABLET | Refills: 0 | Status: SHIPPED | OUTPATIENT
Start: 2023-03-17 | End: 2023-03-24

## 2023-03-17 RX ORDER — GLIMEPIRIDE 4 MG/1
TABLET ORAL
Qty: 200 TABLET | Refills: 2 | Status: SHIPPED | OUTPATIENT
Start: 2023-03-17 | End: 2024-01-28

## 2023-05-04 NOTE — PROGRESS NOTES
[facility] Follow-up Visit    Date of Visit: 5/15/2023     CC:  Follow-up for JASPER management      HPI:  Zena Hui is a very pleasant 67 y.o. year old female never smoker, with a PMHx of JASPER on CPAP, elevated BMI, DSL, T2DM, HTN who presented to the [facility] for a regular follow up. Last seen in the office on 5/11/2022 with Dr. Barajas.     Patient presents for annual compliance.  Patient states that she has been having issues falling asleep on the couch and forgetting to put her CPAP mask on.  She does state that she will have occasional issues falling asleep at night, a dry mouth in the morning, and rare aerophagia-which is alleviated by sleeping upright at night.  Patient denies any significant morning headaches, daytime/driving drowsy, snoring, gasping, apneas, palpitations, mask leak, skin irritation.  Patient goes to bed at 10 PM and wakes up between 5-6 AM.  She will not have any awakenings at night and does not nap.      DME provider:  [employer]  Device:  Stem[employer]Sys AirSense 10  Settings:  AutoCPAP 5-12 CWP  When:  March 2021  Mask:   FFM  Chin strap: No     Cleaning regimen: Once a week with dish soap and water    Compliance:  Compliance data reviewed showing 50% usage > 4hours in last 30 days. Average AHI 1.6 events/hour. 95% pressure 11.9 CWP. 95% leaks 17.3 L/min. Patient continues to use and benefit from machine.      Sleep History:  S 7/30/2020-      Patient Active Problem List    Diagnosis Date Noted    JASPER on CPAP 06/17/2009    Encounter to establish care 02/28/2023    Type 2 diabetes mellitus with microalbuminuria, with long-term current use of insulin ([facility]) 02/28/2023    Diabetes mellitus with coincident hypertension (HCC) 08/01/2022    Hyperlipidemia associated with type 2 diabetes mellitus (HCC) 08/01/2022    Dyslipidemia 05/09/2022    Long-term insulin use (HCC) 05/09/2022    Morbid obesity (HCC) 11/03/2021    Sedative, hypnotic, or anxiolytic dependence (HCC) 11/03/2021     Obesity (BMI 30-39.9) 07/05/2019    Type 2 diabetes mellitus with hyperglycemia, with long-term current use of insulin (HCC) 01/12/2012    Vitamin d deficiency 03/11/2010    Elevated cholesterol 06/17/2009    Essential hypertension 06/17/2009    Osteopenia 06/17/2009     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    Bronchitis     Essential hypertension, benign     High cholesterol     Hyperlipidemia     Mixed hyperlipidemia     Organic sleep apnea, unspecified     Osteopenia     Renal disorder 04/2019    kidney stones    Sleep apnea     Type II or unspecified type diabetes mellitus without mention of complication, not stated as uncontrolled     oral meds      Past Surgical History:   Procedure Laterality Date    [place] CYSTOSCOPY,INSERT URETERAL STENT Bilateral 07/05/2021    Procedure: CYSTOSCOPY, WITH URETERAL STENT INSERTION - POSSIBLE STENT;  Surgeon: Italo Fair M.D.;  Location: [facility];  Service: Urology    WV CYSTO/URETERO/PYELOSCOPY, DX Bilateral 07/05/2021    Procedure: URETEROSCOPY;  Surgeon: Italo Fair M.D.;  Location: [facility];  Service: Urology    LASERTRIPSY Bilateral 07/05/2021    Procedure: LITHOTRIPSY, USING LASER.;  Surgeon: Italo Fair M.D.;  Location: [facility];  Service: Urology    CYSTOSCOPY N/A 07/05/2019    Procedure: CYSTOSCOPY;  Surgeon: Espinoza Orr M.D.;  Location: [facility];  Service: Urology    URETEROSCOPY Right 07/05/2019    Procedure: URETEROSCOPY;  Surgeon: Espinoza Orr M.D.;  Location: [facility];  Service: Urology    LASERTRIPSY Right 07/05/2019    Procedure: LITHOTRIPSY, USING LASER;  Surgeon: Espinoza Orr M.D.;  Location: [facility];  Service: Urology    STENT PLACEMENT Right 07/05/2019    Procedure: STENT PLACEMENT;  Surgeon: Espinoza Orr M.D.;  Location: [facility];  Service: Urology     OTHER  2019    lithotripsy    COLONOSCOPY WITH POLYP  10/2007    tubular adenoma    OTHER ORTHOPEDIC SURGERY N/A 1998    laminectomy    EMBOLECTOMY      remove blood clot from spinal cord    LAMINOTOMY       Family History   Problem Relation Age of Onset    Arthritis Mother     Lung Disease Mother         COPD    Hypertension Mother     Hyperlipidemia Mother     Heart Disease Father          @ 41 from heart attack    Hyperlipidemia Father     Heart Disease Brother         CAD   S/P CABG    Cancer Maternal Grandmother         Pancreatic cancer    Cancer Brother         Prostate cancer     Social History     Socioeconomic History    Marital status:      Spouse name: Not on file    Number of children: Not on file    Years of education: Not on file    Highest education level: Associate degree: academic program   Occupational History    Not on file   Tobacco Use    Smoking status: Never    Smokeless tobacco: Never   Vaping Use    Vaping Use: Never used   Substance and Sexual Activity    Alcohol use: Never    Drug use: Never    Sexual activity: Not Currently     Partners: Male     Birth control/protection: Post-Menopausal   Other Topics Concern    Not on file   Social History Narrative    Not on file     Social Determinants of Health     Financial Resource Strain: Low Risk  (2022)    Overall Financial Resource Strain (CARDIA)     Difficulty of Paying Living Expenses: Not hard at all   Food Insecurity: No Food Insecurity (2022)    Hunger Vital Sign     Worried About Running Out of Food in the Last Year: Never true     Ran Out of Food in the Last Year: Never true   Transportation Needs: No Transportation Needs (2022)    PRAPARE - Transportation     Lack of Transportation (Medical): No     Lack of Transportation (Non-Medical): No   Physical Activity: Insufficiently Active (2022)    Exercise Vital Sign     Days of Exercise per Week: 2 days     Minutes of Exercise per Session: 30  min   Stress: No Stress Concern Present (12/11/2022)    [employer] - Occupational Stress Questionnaire     Feeling of Stress : Not at all   Social Connections: Moderately Isolated (12/11/2022)    Social Connection and Isolation Panel [NHANES]     Frequency of Communication with Friends and Family: More than three times a week     Frequency of Social Gatherings with Friends and Family: Once a week     Attends Yazdanism Services: Never     Active Member of Clubs or Organizations: No     Attends Club or Organization Meetings: Never     Marital Status:    Intimate Partner Violence: Not on file   Housing Stability: Low Risk  (12/11/2022)    Housing Stability Vital Sign     Unable to Pay for Housing in the Last Year: No     Number of Places Lived in the Last Year: 1     Unstable Housing in the Last Year: No     Current Outpatient Medications   Medication Sig Dispense Refill    TRULICITY 4.5 MG/0.5ML Solution Pen-injector INJECT 4.5 MG UNDER THE SKIN ONCE WEEKLY 2 mL 0    glimepiride (AMARYL) 4 MG Tab TAKE 1 TABLET BY MOUTH TWICE A  Tablet 2    rosuvastatin (CRESTOR) 20 MG Tab Take 1 Tablet by mouth every day. 100 Tablet 3    Insulin Glargine, 1 Unit Dial, (TOUJEO SOLOSTAR) 300 UNIT/ML Solution Pen-injector Inject 50 Units under the skin every day. 15 mL 3    Continuous Blood Gluc  (FREESTYLE ETELVINA 2 READER) Device 1 Each continuous. 1 Each 0    Continuous Blood Gluc Sensor (FREESTYLE ETELVINA 2 SENSOR) Misc 1 Each every 14 days. 6 Each 3    Multiple Vitamin (MULTIVITAMIN ADULT PO) Take  by mouth.      metformin (GLUCOPHAGE) 1000 MG tablet Take 1 Tablet by mouth 2 times a day with meals. 180 Tablet 2    Telmisartan-amLODIPine 80-5 MG Tab Take  by mouth.      aspirin 81 MG tablet Take 81 mg by mouth every day.       No current facility-administered medications for this visit.      ALLERGIES: Amoxicillin    ROS:  Constitutional: Denies fever, chills, sweats,  weight loss,  "fatigue  Cardiovascular: Denies chest pain, tightness, palpitations, swelling in legs/feet  Respiratory: Denies shortness of breath, cough, sputum, wheezing, painful breathing   Sleep: per HPI  Gastrointestinal: Denies  difficulty swallowing, nausea, abdominal pain, diarrhea, constipation, heartburn.  Musculoskeletal: Denies painful joints, sore muscles,       PHYSICAL EXAM:  /74 (BP Location: Left arm, Patient Position: Sitting, BP Cuff Size: Adult)   Pulse 77   Resp 16   Ht 1.6 m (5' 3\")   Wt 95.3 kg (210 lb)   LMP 01/01/2009   SpO2 94% Comment: room air at rest  BMI 37.20 kg/m²   Appearance: Well-nourished, well-developed, no acute distress  Eyes:  No scleral icterus , EOMI  ENMT: No redness of the oropharynx  Lung auscultation:  No wheezes rhonchi rubs or rales  Cardiac: No murmurs, rubs, or gallops; regular rhythm, normal rate; no edema  Musculoskeletal:  Grossly normal; gait and station normal; digits and nails normal  Skin:  No rashes, petechiae, cyanosis  Neurologic: without focal signs; oriented to person, time, place, and purpose; judgement intact  Psychiatric:  No depression, anxiety, agitation  Mallampati score: Class III    Assessment and Plan:    The medical record was reviewed.    Diagnostic and titration nocturnal polysomnogram's, home sleep apnea tests, continuous nocturnal oximetry results, multiple sleep latency tests, and compliance reports reviewed.    Problem List Items Addressed This Visit       JASPER on CPAP     Sleep Apnea:    The pathophysiology of sleep anea and the increased risk of cardiovascular morbidity from untreated sleep apnea is discussed in detail with the patient.  Urged to avoid supine sleep, weight gain and alcoholic beverages since all of these can worsen sleep apnea. Cautioned against drowsy driving. If feeling sleepy while driving, pull over for a break/nap, rather than persist on the road, in the interest of own safety and that of others on the " road.    Compliance download was reviewed and discussed with the patient.     - Patient declines a prescription for mask and supplies at this time, due to the patient having lots of supplies at home and being overwhelmed with new shipments.  She will reach out when she would like prescription placed.  - Replacement schedule for supplies were reviewed.  - Compliance was reinforced  - Recommended the patient against the use of Ozone , such as SoClean  - Clean supplies a couple times a week with dish soap and water and air dry  - Recommended the patient change out supplies as recommended for best mask fit and usage of the machine  - Advised patient to reach out via [employer] if any questions or concerns should arise.   - Equipment replacement schedule:  Mask cushion every month  Mask every 6 months  Head gear every 6 months  Tubing every 3 months  Ultra-fine filters 2 times per month  Foam filter every 6 months  Humidifier chamber every 6 months    Has been advised to continue the current AutoCPAP 5-12 CWP, clean equipment frequently, and get new mask and supplies as allowed by insurance and DME. Recommend an earlier appointment, if significant treatment barriers develop.    The risks of untreated sleep apnea were discussed with the patient at length. Patients with sleep apnea are at increased risk of cardiovascular disease including coronary artery disease, systemic arterial hypertension, pulmonary arterial hypertension, cardiac arrythmias, and stroke. The patient was advised to avoid driving a motor vehicle when drowsy.    Positive airway pressure will favorably impact many of the adverse conditions and effects provoked by sleep apnea.          Have advised the patient to follow up with the appropriate healthcare practitioners for all other medical problems and issues.    Return in about 1 year (around 5/15/2024), or if symptoms worsen or fail to improve, for Annual compliance .    Please note portions of this  record was created using voice recognition software. I have made every reasonable attempt to correct obvious errors, but I expect that there are errors of grammar and possibly content I did not discover before finalizing the note.    Time spent in record review prior to patient arrival, reviewing results, and in face-to-face encounter totaled 20 min.  __________  GINGER Harris  Pulmonary & Sleep Medicine  [facility]

## 2023-05-15 ENCOUNTER — OFFICE VISIT (OUTPATIENT)
Dept: SLEEP MEDICINE | Facility: MEDICAL CENTER | Age: 68
End: 2023-05-15
Payer: MEDICARE

## 2023-05-15 VITALS
OXYGEN SATURATION: 94 % | SYSTOLIC BLOOD PRESSURE: 128 MMHG | DIASTOLIC BLOOD PRESSURE: 74 MMHG | BODY MASS INDEX: 37.21 KG/M2 | HEART RATE: 77 BPM | RESPIRATION RATE: 16 BRPM | HEIGHT: 63 IN | WEIGHT: 210 LBS

## 2023-05-15 DIAGNOSIS — G47.33 OSA ON CPAP: ICD-10-CM

## 2023-05-15 PROCEDURE — 99213 OFFICE O/P EST LOW 20 MIN: CPT

## 2023-05-15 PROCEDURE — 3078F DIAST BP <80 MM HG: CPT

## 2023-05-15 PROCEDURE — 1125F AMNT PAIN NOTED PAIN PRSNT: CPT

## 2023-05-15 PROCEDURE — 3074F SYST BP LT 130 MM HG: CPT

## 2023-05-15 ASSESSMENT — FIBROSIS 4 INDEX: FIB4 SCORE: 0.61

## 2023-05-15 NOTE — PATIENT INSTRUCTIONS
Equipment replacement schedule:  Mask cushion every month  Mask every 6 months  Head gear every 6 months  Tubing every 3 months  Ultra-fine filters 2 times per month  Foam filter every 6 months  Humidifier chamber every 6 months

## 2023-05-15 NOTE — ASSESSMENT & PLAN NOTE
Sleep Apnea:    The pathophysiology of sleep anea and the increased risk of cardiovascular morbidity from untreated sleep apnea is discussed in detail with the patient.  Urged to avoid supine sleep, weight gain and alcoholic beverages since all of these can worsen sleep apnea. Cautioned against drowsy driving. If feeling sleepy while driving, pull over for a break/nap, rather than persist on the road, in the interest of own safety and that of others on the road.    Compliance download was reviewed and discussed with the patient.     - Patient declines a prescription for mask and supplies at this time, due to the patient having lots of supplies at home and being overwhelmed with new shipments.  She will reach out when she would like prescription placed.  - Replacement schedule for supplies were reviewed.  - Compliance was reinforced  - Recommended the patient against the use of Ozone , such as SoClean  - Clean supplies a couple times a week with dish soap and water and air dry  - Recommended the patient change out supplies as recommended for best mask fit and usage of the machine  - Advised patient to reach out via [employer] if any questions or concerns should arise.   - Equipment replacement schedule:  Mask cushion every month  Mask every 6 months  Head gear every 6 months  Tubing every 3 months  Ultra-fine filters 2 times per month  Foam filter every 6 months  Humidifier chamber every 6 months    Has been advised to continue the current AutoCPAP 5-12 CWP, clean equipment frequently, and get new mask and supplies as allowed by insurance and DME. Recommend an earlier appointment, if significant treatment barriers develop.    The risks of untreated sleep apnea were discussed with the patient at length. Patients with sleep apnea are at increased risk of cardiovascular disease including coronary artery disease, systemic arterial hypertension, pulmonary arterial hypertension, cardiac arrythmias, and stroke. The  patient was advised to avoid driving a motor vehicle when drowsy.    Positive airway pressure will favorably impact many of the adverse conditions and effects provoked by sleep apnea.

## 2023-05-16 ENCOUNTER — OFFICE VISIT (OUTPATIENT)
Dept: MEDICAL GROUP | Facility: PHYSICIAN GROUP | Age: 68
End: 2023-05-16
Payer: MEDICARE

## 2023-05-16 ENCOUNTER — HOSPITAL ENCOUNTER (OUTPATIENT)
Dept: LAB | Facility: MEDICAL CENTER | Age: 68
End: 2023-05-16
Attending: FAMILY MEDICINE
Payer: COMMERCIAL

## 2023-05-16 VITALS
HEART RATE: 78 BPM | DIASTOLIC BLOOD PRESSURE: 72 MMHG | HEIGHT: 63 IN | TEMPERATURE: 97.8 F | OXYGEN SATURATION: 95 % | SYSTOLIC BLOOD PRESSURE: 126 MMHG | BODY MASS INDEX: 37.1 KG/M2 | WEIGHT: 209.4 LBS | RESPIRATION RATE: 16 BRPM

## 2023-05-16 DIAGNOSIS — M25.50 ARTHRALGIA, UNSPECIFIED JOINT: ICD-10-CM

## 2023-05-16 DIAGNOSIS — R19.4 BOWEL HABIT CHANGES: ICD-10-CM

## 2023-05-16 DIAGNOSIS — R80.9 TYPE 2 DIABETES MELLITUS WITH MICROALBUMINURIA, WITH LONG-TERM CURRENT USE OF INSULIN (HCC): ICD-10-CM

## 2023-05-16 DIAGNOSIS — E66.01 MORBID (SEVERE) OBESITY DUE TO EXCESS CALORIES (HCC): ICD-10-CM

## 2023-05-16 DIAGNOSIS — E11.29 TYPE 2 DIABETES MELLITUS WITH MICROALBUMINURIA, WITH LONG-TERM CURRENT USE OF INSULIN (HCC): ICD-10-CM

## 2023-05-16 DIAGNOSIS — Z79.4 TYPE 2 DIABETES MELLITUS WITH MICROALBUMINURIA, WITH LONG-TERM CURRENT USE OF INSULIN (HCC): ICD-10-CM

## 2023-05-16 PROBLEM — F13.20 SEDATIVE, HYPNOTIC, OR ANXIOLYTIC DEPENDENCE (HCC): Status: RESOLVED | Noted: 2021-11-03 | Resolved: 2023-05-16

## 2023-05-16 PROBLEM — Z76.89 ENCOUNTER TO ESTABLISH CARE: Status: RESOLVED | Noted: 2023-02-28 | Resolved: 2023-05-16

## 2023-05-16 LAB
ERYTHROCYTE [SEDIMENTATION RATE] IN BLOOD BY WESTERGREN METHOD: 7 MM/HOUR (ref 0–25)
RHEUMATOID FACT SER IA-ACNC: 17 IU/ML (ref 0–14)

## 2023-05-16 PROCEDURE — 86003 ALLG SPEC IGE CRUDE XTRC EA: CPT | Mod: 91

## 2023-05-16 PROCEDURE — 3078F DIAST BP <80 MM HG: CPT | Performed by: FAMILY MEDICINE

## 2023-05-16 PROCEDURE — 3074F SYST BP LT 130 MM HG: CPT | Performed by: FAMILY MEDICINE

## 2023-05-16 PROCEDURE — 36415 COLL VENOUS BLD VENIPUNCTURE: CPT

## 2023-05-16 PROCEDURE — 85652 RBC SED RATE AUTOMATED: CPT

## 2023-05-16 PROCEDURE — 83516 IMMUNOASSAY NONANTIBODY: CPT

## 2023-05-16 PROCEDURE — 99214 OFFICE O/P EST MOD 30 MIN: CPT | Performed by: FAMILY MEDICINE

## 2023-05-16 PROCEDURE — 86431 RHEUMATOID FACTOR QUANT: CPT

## 2023-05-16 PROCEDURE — 86038 ANTINUCLEAR ANTIBODIES: CPT

## 2023-05-16 ASSESSMENT — FIBROSIS 4 INDEX: FIB4 SCORE: 0.61

## 2023-05-16 NOTE — ASSESSMENT & PLAN NOTE
This is a chronic problem.  Patient is working on losing weight.  She is hoping that it can reduce her need for CPAP and insulin.

## 2023-05-16 NOTE — PROGRESS NOTES
Subjective:     CC: Here for several issues.    HPI:   Zena presents today with the following medical concerns:    Arthralgia  This is now a chronic problem.  Patient is having progressive arthralgias in her hands.  Aching can wake her up at night and only feels better after icing her hands.  She is having worse discomfort in her left hand although she is right-handed.  Also has a history of trigger finger in the left thumb.  She had an injection in it about 9 years ago and it is starting to bother her again but not ready for another injection as of yet.  She is tried IcyHot to the area.  Also taken some Advil and Aleve but they have not been very helpful.  She does want avoid medications if possible.  Does have a half sister with rheumatoid arthritis.  She is not sure if her mother had just osteoarthritis or rheumatoid arthritis.    Body mass index (BMI) 37.0-37.9, adult  This is a chronic problem.  Patient is working on losing weight.  She is hoping that it can reduce her need for CPAP and insulin.    Bowel habit changes  This is a chronic problem.  She states she has had troubles with her bowel habits since a teenager.  Her daughter does have celiac disease and she would like to be tested as well.    Type 2 diabetes mellitus with microalbuminuria, with long-term current use of insulin (HCC)  This is a chronic stable condition.  Continue to monitor.  She is followed by Dr. Beyer.    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    Bronchitis     Essential hypertension, benign     High cholesterol     Hyperlipidemia     Mixed hyperlipidemia     Organic sleep apnea, unspecified     Osteopenia     Renal disorder 04/2019    kidney stones    Sleep apnea     Type II or unspecified type diabetes mellitus without mention of complication, not stated as uncontrolled     oral meds       Social History     Tobacco Use    Smoking status: Never     "Smokeless tobacco: Never   Vaping Use    Vaping Use: Never used   Substance Use Topics    Alcohol use: Never    Drug use: Never       Current Outpatient Medications Ordered in Epic   Medication Sig Dispense Refill    TRULICITY 4.5 MG/0.5ML Solution Pen-injector INJECT 4.5 MG UNDER THE SKIN ONCE WEEKLY 2 mL 0    glimepiride (AMARYL) 4 MG Tab TAKE 1 TABLET BY MOUTH TWICE A  Tablet 2    rosuvastatin (CRESTOR) 20 MG Tab Take 1 Tablet by mouth every day. 100 Tablet 3    Insulin Glargine, 1 Unit Dial, (TOUJEO SOLOSTAR) 300 UNIT/ML Solution Pen-injector Inject 50 Units under the skin every day. 15 mL 3    Continuous Blood Gluc  (FREESTYLE ETELVINA 2 READER) Device 1 Each continuous. 1 Each 0    Continuous Blood Gluc Sensor (FREESTYLE ETELVINA 2 SENSOR) Misc 1 Each every 14 days. 6 Each 3    Multiple Vitamin (MULTIVITAMIN ADULT PO) Take  by mouth.      metformin (GLUCOPHAGE) 1000 MG tablet Take 1 Tablet by mouth 2 times a day with meals. 180 Tablet 2    Telmisartan-amLODIPine 80-5 MG Tab Take  by mouth.      aspirin 81 MG tablet Take 81 mg by mouth every day.       No current [facility]-ordered facility-administered medications on file.       Allergies:  Amoxicillin    Health Maintenance: Completed    ROS:  Gen: no fevers/chills,   Eyes: no changes in vision  ENT: no sore throat, no hearing loss, no bloody nose  Pulm: no sob, no cough  CV: no chest pain, no palpitations  GI: no nausea/vomiting, no diarrhea  : no dysuria  MSk: no myalgias  Skin: no rash  Neuro: no headaches, no numbness/tingling  Heme/Lymph: no easy bruising      Objective:       Exam:  /72 (BP Location: Right arm, Patient Position: Sitting, BP Cuff Size: Adult)   Pulse 78   Temp 36.6 °C (97.8 °F) (Temporal)   Resp 16   Ht 1.6 m (5' 3\")   Wt 95 kg (209 lb 6.4 oz)   LMP 01/01/2009   SpO2 95%   BMI 37.09 kg/m²  Body mass index is 37.09 kg/m².    Gen: Alert and oriented, No apparent distress.  Ext: No clubbing, cyanosis, edema.  Patient's " left hand does appear to be mildly swollen compared to the right.  No redness is noted.  Normal range of motion of the fingers is present except the left third digit has incomplete flexion due to previous injury.  She also has some discomfort along the lateral aspect of the left midfoot.  No swelling or redness noted.      Labs: Ordered    Assessment & Plan:     67 y.o. female with the following -     1. Bowel habit changes  This is a chronic problem.  Discussed and lab ordered.  - CELIAC DISEASE AB SCREEN W/RFX  - ALLERGEN, FOOD INCLUSIVE PANEL; Future    2. Arthralgia, unspecified joint  This is a chronic progressive problem.  We will start with lab work to rule out any causes of arthralgias.  And once we get the results we can better direct her.  - PB REFLEXIVE PROFILE; Future  - Sed Rate; Future  - RHEUMATOID ARTHRITIS FACTOR; Future    3. Morbid (severe) obesity due to excess calories (HCC)  This is a chronic problem.  Continue to encourage weight reduction.    4. Body mass index (BMI) 37.0-37.9, adult  This is a chronic problem.  I told her if she does continue with weight loss it should improve control of her diabetes and might get her off of her CPAP machine.    5. Type 2 diabetes mellitus with microalbuminuria, with long-term current use of insulin (HCC)  This is a chronic problem.  Continue care through her endocrinologist.    HCC Gap Form    Diagnosis: E66.01 - Morbid (severe) obesity due to excess calories (HCC)  Z68.37 - Body mass index (BMI) 37.0-37.9, adult  Comorbidity Diagnosis: Type 2 diabetes mellitus with microalbuminuria, with long-term current use of insulin (HCC)  The current BMI is 37.09 kg/m2 as of 05/16/23 11:00 PDT  Assessment and plan: Chronic, improving. Encouraged healthy diet and physical activity changes with a goal of weight loss. Follow up at least annually. The comorbid condition is improving based on today's assessment. Continue current treatment plan including control of risk  factors. Follow up at least yearly.    Last edited 05/16/23 11:01 PDT by Link Polanco III, M.D.         Return if symptoms worsen or fail to improve.    Please note that this dictation was created using voice recognition software. I have made every reasonable attempt to correct obvious errors, but I expect that there are errors of grammar and possibly content that I did not discover before finalizing the note.

## 2023-05-16 NOTE — ASSESSMENT & PLAN NOTE
This is now a chronic problem.  Patient is having progressive arthralgias in her hands.  Aching can wake her up at night and only feels better after icing her hands.  She is having worse discomfort in her left hand although she is right-handed.  Also has a history of trigger finger in the left thumb.  She had an injection in it about 9 years ago and it is starting to bother her again but not ready for another injection as of yet.  She is tried IcyHot to the area.  Also taken some Advil and Aleve but they have not been very helpful.  She does want avoid medications if possible.  Does have a half sister with rheumatoid arthritis.  She is not sure if her mother had just osteoarthritis or rheumatoid arthritis.

## 2023-05-16 NOTE — ASSESSMENT & PLAN NOTE
This is a chronic problem.  She states she has had troubles with her bowel habits since a teenager.  Her daughter does have celiac disease and she would like to be tested as well.

## 2023-05-18 ENCOUNTER — OFFICE VISIT (OUTPATIENT)
Dept: ENDOCRINOLOGY | Facility: MEDICAL CENTER | Age: 68
End: 2023-05-18
Attending: INTERNAL MEDICINE
Payer: COMMERCIAL

## 2023-05-18 VITALS
OXYGEN SATURATION: 95 % | DIASTOLIC BLOOD PRESSURE: 70 MMHG | SYSTOLIC BLOOD PRESSURE: 126 MMHG | WEIGHT: 209 LBS | HEIGHT: 63 IN | HEART RATE: 87 BPM | BODY MASS INDEX: 37.03 KG/M2

## 2023-05-18 DIAGNOSIS — Z79.4 LONG-TERM INSULIN USE (HCC): ICD-10-CM

## 2023-05-18 DIAGNOSIS — E11.65 TYPE 2 DIABETES MELLITUS WITH HYPERGLYCEMIA, WITH LONG-TERM CURRENT USE OF INSULIN (HCC): ICD-10-CM

## 2023-05-18 DIAGNOSIS — E55.9 VITAMIN D DEFICIENCY: ICD-10-CM

## 2023-05-18 DIAGNOSIS — E78.5 DYSLIPIDEMIA: ICD-10-CM

## 2023-05-18 DIAGNOSIS — E08.21 DIABETIC NEPHROPATHY ASSOCIATED WITH DIABETES MELLITUS DUE TO UNDERLYING CONDITION (HCC): ICD-10-CM

## 2023-05-18 DIAGNOSIS — I10 ESSENTIAL HYPERTENSION: ICD-10-CM

## 2023-05-18 DIAGNOSIS — Z79.4 TYPE 2 DIABETES MELLITUS WITH HYPERGLYCEMIA, WITH LONG-TERM CURRENT USE OF INSULIN (HCC): ICD-10-CM

## 2023-05-18 LAB
ALMOND IGE QN: <0.1 KU/L
ASPARAGUS IGE QN: <0.1 KU/L
AVOCADO IGE QN: <0.1 KU/L
BAKER'S YEAST IGE QN: <0.1 KU/L
BANANA IGE QN: <0.1 KU/L
BASIL IGE QN: <0.1 KU/L
BAYLEAF IGE QN: <0.1 KU/L
BEEF IGE QN: <0.1 KU/L
BEET IGE QN: <0.1 KU/L
BELL PEPPER IGE QN: <0.1 KU/L
BLACK PEPPER IGE QN: <0.1 KU/L
BLUE MUSSEL IGE QN: <0.1 KU/L
BLUEBERRY IGE QN: <0.1 KU/L
BRAZIL NUT IGE QN: <0.1 KU/L
BROCCOLI IGE QN: <0.1 KU/L
BUCKWHEAT IGE QN: <0.1 KU/L
CABBAGE IGE QN: <0.1 KU/L
CARROT IGE QN: <0.1 KU/L
CASHEW NUT IGE QN: <0.1 KU/L
CHESTNUT IGE QN: <0.1 KU/L
CHICKPEA IGE AB [UNITS/VOLUME] IN SERUM: <0.1 KU/L
CHOCOLATE IGE QN: <0.1 KU/L
CINNAMON IGE QN: <0.1 KU/L
CLAM IGE QN: <0.1 KU/L
COCONUT IGE QN: <0.1 KU/L
CODFISH IGE QN: <0.1 KU/L
COW MILK IGE QN: 0.13 KU/L
CRAB IGE QN: <0.1 KU/L
CUCUMBER IGE QN: <0.1 KU/L
CULTIVATED COTTON IGE QN: <0.1 KU/L
DEPRECATED MISC ALLERGEN IGE RAST QL: NORMAL
DILL IGE QN: <0.1 KU/L
EGG WHITE IGE QN: <0.1 KU/L
GINGER IGE QN: <0.1 KU/L
GRAPE IGE QN: <0.1 KU/L
HALIBUT IGE QN: <0.1 KU/L
HAZELNUT IGE QN: <0.1 KU/L
HBA1C MFR BLD: 8.2 % (ref ?–5.8)
LETTUCE IGE QN: <0.1 KU/L
LIMA BEAN IGE QN: <0.1 KU/L
MELON IGE QN: <0.1 KU/L
NUCLEAR IGG SER QL IA: NORMAL
ONION IGE QN: <0.1 KU/L
ORANGE IGE QN: <0.1 KU/L
OREGANO IGE QN: <0.1 KU/L
PEA IGE QN: <0.1 KU/L
PEANUT IGE QN: <0.1 KU/L
PEAR IGE QN: <0.1 KU/L
PLUM IGE QN: <0.1 KU/L
POCT INT CON NEG: NEGATIVE
POCT INT CON POS: POSITIVE
PORK IGE QN: <0.1 KU/L
POTATO IGE QN: <0.1 KU/L
RASPBERRY IGE QN: <0.1 KU/L
SESAME SEED IGE QN: <0.1 KU/L
SHRIMP IGE QN: <0.1 KU/L
SOYBEAN IGE QN: <0.1 KU/L
TEA IGE QN: <0.1 KU/L
TOMATO IGE QN: <0.1 KU/L
TROUT IGE QN: <0.1 KU/L
TURKEY MEAT IGE QN: <0.1 KU/L
WALNUT IGE QN: <0.1 KU/L
WATERMELON IGE QN: <0.1 KU/L

## 2023-05-18 PROCEDURE — 99213 OFFICE O/P EST LOW 20 MIN: CPT | Performed by: INTERNAL MEDICINE

## 2023-05-18 PROCEDURE — 99215 OFFICE O/P EST HI 40 MIN: CPT | Performed by: INTERNAL MEDICINE

## 2023-05-18 PROCEDURE — 3078F DIAST BP <80 MM HG: CPT | Performed by: INTERNAL MEDICINE

## 2023-05-18 PROCEDURE — 83036 HEMOGLOBIN GLYCOSYLATED A1C: CPT | Performed by: INTERNAL MEDICINE

## 2023-05-18 PROCEDURE — 3074F SYST BP LT 130 MM HG: CPT | Performed by: INTERNAL MEDICINE

## 2023-05-18 RX ORDER — DEXAMETHASONE 1 MG
1 TABLET ORAL
Qty: 2 TABLET | Refills: 0 | Status: SHIPPED | OUTPATIENT
Start: 2023-05-18 | End: 2023-06-19

## 2023-05-18 RX ORDER — DEXAMETHASONE 1 MG
1 TABLET ORAL
Qty: 2 TABLET | Refills: 0 | Status: SHIPPED | OUTPATIENT
Start: 2023-05-18 | End: 2023-05-18 | Stop reason: SDUPTHER

## 2023-05-18 RX ORDER — FINERENONE 10 MG/1
10 TABLET, FILM COATED ORAL DAILY
Qty: 30 TABLET | Refills: 11 | Status: SHIPPED | OUTPATIENT
Start: 2023-05-18 | End: 2023-10-04

## 2023-05-18 ASSESSMENT — FIBROSIS 4 INDEX: FIB4 SCORE: 0.61

## 2023-05-18 NOTE — PROGRESS NOTES
RN-CDE Note    Subjective:   Endocrinology Clinic Progress Note  PCP: Link Polanco III, M.D.    HPI:  Zena Hui is a 67 y.o. old patient who is seen today by the Diabetes Nurse Specialist for review of her uncontrolled type 2 diabetes with long term use of insulin.    Recent changes in health: complaining of pain in her hands, has seen her PCP and being worked up for possible rheumatoid arthritis.  States she started walking and now has tendonitis in her foot.   DM:   Last A1c:   Lab Results   Component Value Date/Time    HBA1C 8.2 (A) 05/18/2023 10:11 AM      Previous A1c was 9.2 on 1/10/23  A1C GOAL: < 7    Diabetes Medications:   Trulicity 4.5 mg weekly  Metformin 1000 mg bid  Toujeo 50 units daily  Glimepiride 4 mg bid  Glucose monitoring frequency: using Christina CGM    Hypoglycemic episodes: no    Lab Results   Component Value Date/Time    MALBCRT 471 (H) 01/06/2023 07:12 AM    MICROALBUR 66.0 01/06/2023 07:12 AM        ACR Albumin/Creatinine Ratio goal <30     HTN:   Blood pressure goal <130/<80 .   Currently Rx ACE/ARB: Yes     Dyslipidemia:    Lab Results   Component Value Date/Time    CHOLSTRLTOT 150 11/18/2022 06:18 AM    LDL 64 11/18/2022 06:18 AM    HDL 58 11/18/2022 06:18 AM    TRIGLYCERIDE 141 11/18/2022 06:18 AM         Currently Rx Statin: Yes     She  reports that she has never smoked. She has never used smokeless tobacco.      Plan:     Discussed and educated on:   - All medications, side effects and compliance (discussed carefully)  - Annual eye examinations at Ophthalmology  - Foot Care: what to look for when checking feet every day  - HbA1C: target  - Home glucose monitoring emphasized  - Weight control and daily exercise

## 2023-05-18 NOTE — PROGRESS NOTES
CHIEF COMPLAINT: Patient is here for follow up of Type 2 Diabetes Mellitus    HPI:     Zena Hui is a 67 y.o. female with Type 2 Diabetes Mellitus here for follow up.      Labs from 5/18/2023 hba1c is 8.3%  Labs from 8/15/2022 HbA1c was 9.7%    She tried and failed Farxiga due to UTIs        On follow up she is taking  Lantus 50u daily  Trulicity 4.5mg weekly  Metformin 1000mg bid  Glimepiride 4mg bid      She denies side effects on her medications  Download of her [employer] 2 CGM shows  Average  with TIR 37%          She has hyperlipidemia and is on Crestor 20 mg daily  She denies a history of coronary artery disease and cerebrovascular disease  LDL cholesterol was less than 70 on 11/2022      She does have diabetic kidney disease  She is on an ARB (telmisartan)  She cant tolerate SGLT2 inhibitors   Her blood pressure is fairly controlled  Her U ACR was 471 on Jan 2023        Her last eye exam was on 3/23/2023          BG Diary:  CGM was downloaded and reviewed     Weight has decreased 5 pounds over last 3 mos    Diabetes Complications   Retinopathy: No known retinopathy.  Last eye exam: 3/2023  Neuropathy: Denies paresthesias or numbness in hands or feet. Denies any foot wounds.  Exercise: Minimal.  Diet: Fair.  Patient's medications, allergies, and social histories were reviewed and updated as appropriate.    ROS:     CONS:     No fever, no chills   EYES:     No diplopia, no blurry vision   CV:           No chest pain, no palpitations   PULM:     No SOB, no cough, no hemoptysis.   GI:            No nausea, no vomiting, no diarrhea, no constipation   ENDO:     No polyuria, no polydipsia, no heat intolerance, no cold intolerance       Past Medical History:  Problem List:  2023-05: Bowel habit changes  2023-05: Arthralgia  2023-02: Encounter to establish care  2023-02: Type 2 diabetes mellitus with microalbuminuria (HCC)  2023-02: Type 2 diabetes mellitus with microalbuminuria, with long-  term current use  of insulin ([facility])  2022-08: BMI 39.0-39.9,adult  2022-08: Diabetes mellitus with coincident hypertension ([facility])  2022-08: Hyperlipidemia associated with type 2 diabetes mellitus ([facility])  2022-08: Uncontrolled type 2 diabetes mellitus with hyperglycemia   ([facility])  2022-08: Microalbuminuria due to type 2 diabetes mellitus ([facility])  2022-05: Dyslipidemia  2022-05: Long-term insulin use ([facility])  2021-11: Morbid obesity ([facility])  2021-11: Sedative, hypnotic, or anxiolytic dependence ([facility])  2019-07: Body mass index (BMI) 37.0-37.9, adult  2012-01: Type 2 diabetes mellitus with hyperglycemia, with long-term   current use of insulin ([facility])  2010-03: Vitamin d deficiency  2009-06: Diabetes  2009-06: Elevated cholesterol  2009-06: Essential hypertension  2009-06: Osteopenia  2009-06: JASPER on CPAP        Past Surgical History:  Past Surgical History:   Procedure Laterality Date    SC CYSTOSCOPY,INSERT URETERAL STENT Bilateral 07/05/2021    Procedure: CYSTOSCOPY, WITH URETERAL STENT INSERTION - POSSIBLE STENT;  Surgeon: Italo Fair M.D.;  Location: [facility];  Service: Urology    SC CYSTO/URETERO/PYELOSCOPY, DX Bilateral 07/05/2021    Procedure: URETEROSCOPY;  Surgeon: Italo Fair M.D.;  Location: [facility];  Service: Urology    LASERTRIPSY Bilateral 07/05/2021    Procedure: LITHOTRIPSY, USING LASER.;  Surgeon: Italo Fair M.D.;  Location: [facility];  Service: Urology    CYSTOSCOPY N/A 07/05/2019    Procedure: CYSTOSCOPY;  Surgeon: Espinoza Orr M.D.;  Location: [facility];  Service: Urology    URETEROSCOPY Right 07/05/2019    Procedure: URETEROSCOPY;  Surgeon: Espinoza Orr M.D.;  Location: [facility];  Service: Urology    LASERTRIPSY Right 07/05/2019    Procedure: LITHOTRIPSY, USING LASER;  Surgeon: Espinoza Orr M.D.;  Location: [facility];  Service: Urology    STENT PLACEMENT Right 07/05/2019    Procedure: STENT PLACEMENT;  Surgeon: Espinoza FARIA  "KYLE Orr;  [facility];  Service: Urology    OTHER  07/2019    lithotripsy    COLONOSCOPY WITH POLYP  10/2007    tubular adenoma    OTHER ORTHOPEDIC SURGERY N/A 04/1998    laminectomy    EMBOLECTOMY  1998    remove blood clot from spinal cord    LAMINOTOMY          Allergies:  Amoxicillin     Social History:  Social History     Tobacco Use    Smoking status: Never    Smokeless tobacco: Never   Vaping Use    Vaping Use: Never used   Substance Use Topics    Alcohol use: Never    Drug use: Never        Family History:   family history includes Arthritis in her mother; Cancer in her brother and maternal grandmother; Heart Disease in her brother and father; Hyperlipidemia in her father and mother; Hypertension in her mother; Lung Disease in her mother.      PHYSICAL EXAM:   OBJECTIVE:  Vital signs: /70 (BP Location: Left arm, Patient Position: Sitting)   Pulse 87   Ht 1.6 m (5' 3\")   Wt 94.8 kg (209 lb)   LMP 01/01/2009   SpO2 95%   BMI 37.02 kg/m²   GENERAL: Well-developed, well-nourished in no apparent distress.   EYE:  No ocular asymmetry, PERRLA  HENT: Pink, moist mucous membranes.    NECK: No thyromegaly.   CARDIOVASCULAR:  No murmurs  LUNGS: Clear breath sounds  ABDOMEN: Soft, nontender   EXTREMITIES: No clubbing, cyanosis, or edema.   NEUROLOGICAL: No gross focal motor abnormalities   LYMPH: No cervical adenopathy palpated.   SKIN: No rashes, lesions.     Labs:  Lab Results   Component Value Date/Time    HBA1C 8.2 (A) 05/18/2023 10:11 AM        Lab Results   Component Value Date/Time    WBC 10.0 11/18/2022 06:18 AM    WBC 7.0 07/20/2010 08:50 AM    RBC 5.47 (H) 11/18/2022 06:18 AM    RBC 4.96 07/20/2010 08:50 AM    HEMOGLOBIN 14.9 11/18/2022 06:18 AM    MCV 87.6 11/18/2022 06:18 AM    MCV 90 07/20/2010 08:50 AM    MCH 27.2 11/18/2022 06:18 AM    MCH 28.0 07/20/2010 08:50 AM    MCHC 31.1 (L) 11/18/2022 06:18 AM    RDW 43.9 11/18/2022 06:18 AM    RDW 15.2 (H) 07/20/2010 08:50 AM "    MPV 9.7 11/18/2022 06:18 AM       Lab Results   Component Value Date/Time    SODIUM 138 01/06/2023 07:12 AM    POTASSIUM 3.5 (L) 01/06/2023 07:12 AM    CHLORIDE 101 01/06/2023 07:12 AM    CO2 24 01/06/2023 07:12 AM    ANION 13.0 01/06/2023 07:12 AM    GLUCOSE 158 (H) 01/06/2023 07:12 AM    BUN 12 01/06/2023 07:12 AM    CREATININE 0.60 01/06/2023 07:12 AM    CREATININE 0.77 07/20/2010 08:50 AM    CALCIUM 9.5 01/06/2023 07:12 AM    ASTSGOT 15 01/06/2023 07:12 AM    ALTSGPT 18 01/06/2023 07:12 AM    TBILIRUBIN 0.5 01/06/2023 07:12 AM    ALBUMIN 4.4 01/06/2023 07:12 AM    TOTPROTEIN 7.6 01/06/2023 07:12 AM    GLOBULIN 3.2 01/06/2023 07:12 AM    AGRATIO 1.4 01/06/2023 07:12 AM       Lab Results   Component Value Date/Time    CHOLSTRLTOT 135 05/05/2022 0653    TRIGLYCERIDE 128 05/05/2022 0653    HDL 50 05/05/2022 0653    LDL 59 05/05/2022 0653       Lab Results   Component Value Date/Time    MALBCRT 471 (H) 01/06/2023 07:12 AM    MICROALBUR 66.0 01/06/2023 07:12 AM        Lab Results   Component Value Date/Time    TSHULTRASEN 4.130 08/10/2022 0631     No results found for: FREEDIR  No results found for: FREET3  No results found for: THYSTIMIG        ASSESSMENT/PLAN:     1. Type 2 diabetes mellitus with hyperglycemia, with long-term current use of insulin (HCC)  Uncontrolled secondary to hyperglycemia and noncompliance with diet  Start inhaled insulin - needs training first   She is a non smoker and has no chronic lung disease therefore she is a good candidate  Will also screen for abnormal cortisol   Continue Lantus 50 units daily  Continue metformin  Continue glimepiride  Continue Trulicity  Recommend that she follow-up with our diabetes nurse Lisa in 3 months    2. Diabetic nephropathy associated with diabetes mellitus due to underlying condition (HCC)  Uncontrolled secondary to uncontrolled diabetes  She cannot tolerate an SGLT2 inhibitor  Persistent albuminuria despite taking ARB  Recommend starting Kerendia 10 mg  daily  Recommend monitoring potassium and urine albumin    3. Dyslipidemia  Stable continue Crestor  Repeat fasting lipids in 3 months    4. Essential hypertension  Stable  Continue blood pressure medications  Repeat urine albumin in 3 months    5. Vitamin D deficiency  Stable   Vitamin D labs were reviewed with patient  Continue current supplements  Continue monitoring levels       6. Long-term insulin use (HCC)  Patient is on long-term basal insulin therapy plus other oral agents and a GLP-1 for type 2 diabetes management      Return in about 4 months (around 9/18/2023).      Total time spent on day of service was over 60 minutes which included obtaining a detailed history and physical exam, ordering labs, coordinating care and scheduling future follow-up    Thank you kindly for allowing me to participate in the diabetes care plan for this patient.    Trent Timmons MD, FACE, NU      CC:   Carrington Gonzalez D.O.

## 2023-05-19 LAB — GLIADIN PEPTIDE+TTG IGA+IGG SER QL IA: 10 UNITS (ref 0–19)

## 2023-05-24 ENCOUNTER — HOSPITAL ENCOUNTER (OUTPATIENT)
Dept: LAB | Facility: MEDICAL CENTER | Age: 68
End: 2023-05-24
Attending: INTERNAL MEDICINE
Payer: COMMERCIAL

## 2023-05-24 DIAGNOSIS — M05.742 RHEUMATOID ARTHRITIS INVOLVING BOTH HANDS WITH POSITIVE RHEUMATOID FACTOR (HCC): ICD-10-CM

## 2023-05-24 DIAGNOSIS — Z79.4 TYPE 2 DIABETES MELLITUS WITH HYPERGLYCEMIA, WITH LONG-TERM CURRENT USE OF INSULIN (HCC): ICD-10-CM

## 2023-05-24 DIAGNOSIS — E11.65 TYPE 2 DIABETES MELLITUS WITH HYPERGLYCEMIA, WITH LONG-TERM CURRENT USE OF INSULIN (HCC): ICD-10-CM

## 2023-05-24 DIAGNOSIS — M05.741 RHEUMATOID ARTHRITIS INVOLVING BOTH HANDS WITH POSITIVE RHEUMATOID FACTOR (HCC): ICD-10-CM

## 2023-05-24 LAB — CORTIS SERPL-MCNC: 1 UG/DL (ref 0–23)

## 2023-05-24 PROCEDURE — 36415 COLL VENOUS BLD VENIPUNCTURE: CPT

## 2023-05-24 PROCEDURE — 82533 TOTAL CORTISOL: CPT

## 2023-05-24 PROCEDURE — 80299 QUANTITATIVE ASSAY DRUG: CPT

## 2023-05-26 ENCOUNTER — HOSPITAL ENCOUNTER (OUTPATIENT)
Dept: RADIOLOGY | Facility: MEDICAL CENTER | Age: 68
End: 2023-05-26
Attending: PHYSICIAN ASSISTANT
Payer: COMMERCIAL

## 2023-05-26 DIAGNOSIS — M25.50 ARTHRALGIA, UNSPECIFIED JOINT: ICD-10-CM

## 2023-05-26 DIAGNOSIS — N20.0 CALCULUS OF KIDNEY: ICD-10-CM

## 2023-05-26 PROCEDURE — 74018 RADEX ABDOMEN 1 VIEW: CPT

## 2023-05-30 LAB — TEST NAME 95000: NORMAL

## 2023-06-09 ENCOUNTER — DOCUMENTATION (OUTPATIENT)
Dept: HEALTH INFORMATION MANAGEMENT | Facility: OTHER | Age: 68
End: 2023-06-09
Payer: MEDICARE

## 2023-06-19 ENCOUNTER — OFFICE VISIT (OUTPATIENT)
Dept: MEDICAL GROUP | Facility: PHYSICIAN GROUP | Age: 68
End: 2023-06-19
Payer: COMMERCIAL

## 2023-06-19 VITALS
TEMPERATURE: 97.6 F | SYSTOLIC BLOOD PRESSURE: 126 MMHG | BODY MASS INDEX: 37.21 KG/M2 | HEIGHT: 63 IN | OXYGEN SATURATION: 97 % | RESPIRATION RATE: 16 BRPM | DIASTOLIC BLOOD PRESSURE: 72 MMHG | WEIGHT: 210 LBS | HEART RATE: 94 BPM

## 2023-06-19 DIAGNOSIS — J30.1 SEASONAL ALLERGIC RHINITIS DUE TO POLLEN: ICD-10-CM

## 2023-06-19 PROCEDURE — 3078F DIAST BP <80 MM HG: CPT | Performed by: FAMILY MEDICINE

## 2023-06-19 PROCEDURE — 3074F SYST BP LT 130 MM HG: CPT | Performed by: FAMILY MEDICINE

## 2023-06-19 PROCEDURE — 99213 OFFICE O/P EST LOW 20 MIN: CPT | Mod: 25 | Performed by: FAMILY MEDICINE

## 2023-06-19 RX ORDER — TRIAMCINOLONE ACETONIDE 40 MG/ML
40 INJECTION, SUSPENSION INTRA-ARTICULAR; INTRAMUSCULAR ONCE
Status: COMPLETED | OUTPATIENT
Start: 2023-06-19 | End: 2023-06-19

## 2023-06-19 RX ADMIN — TRIAMCINOLONE ACETONIDE 40 MG: 40 INJECTION, SUSPENSION INTRA-ARTICULAR; INTRAMUSCULAR at 15:05

## 2023-06-19 ASSESSMENT — FIBROSIS 4 INDEX: FIB4 SCORE: 0.62

## 2023-06-19 NOTE — ASSESSMENT & PLAN NOTE
This is an acute problem.  Patient states that her sinus congestion with drainage and cough started about 5 weeks ago.  For 1 week the secretions were green but now they have turned back to white and clear.  No fever or chills.  She did start trying some Flonase nasal spray a few days ago and a half of a antihistamine last night.  She states that the first night she slept so long and quite a while but she was still congested this morning.

## 2023-06-19 NOTE — PROGRESS NOTES
Subjective:     CC: Here for sinus congestion and cough.    HPI:   Zena presents today with the following medical concerns:    Seasonal allergic rhinitis due to pollen  This is an acute problem.  Patient states that her sinus congestion with drainage and cough started about 5 weeks ago.  For 1 week the secretions were green but now they have turned back to white and clear.  No fever or chills.  She did start trying some Flonase nasal spray a few days ago and a half of a antihistamine last night.  She states that the first night she slept so long and quite a while but she was still congested this morning.    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    Bronchitis     Essential hypertension, benign     High cholesterol     Hyperlipidemia     Mixed hyperlipidemia     Organic sleep apnea, unspecified     Osteopenia     Renal disorder 04/2019    kidney stones    Sleep apnea     Type II or unspecified type diabetes mellitus without mention of complication, not stated as uncontrolled     oral meds       Social History     Tobacco Use    Smoking status: Never    Smokeless tobacco: Never   Vaping Use    Vaping Use: Never used   Substance Use Topics    Alcohol use: Never    Drug use: Never       Current Outpatient Medications Ordered in Epic   Medication Sig Dispense Refill    metformin (GLUCOPHAGE) 1000 MG tablet TAKE 1 TABLET BY MOUTH TWICE A DAY WITH MEALS 180 Tablet 1    Finerenone (KERENDIA) 10 MG Tab Take 10 mg by mouth every day. 30 Tablet 11    TRULICITY 4.5 MG/0.5ML Solution Pen-injector INJECT 4.5 MG UNDER THE SKIN ONCE WEEKLY 2 mL 0    glimepiride (AMARYL) 4 MG Tab TAKE 1 TABLET BY MOUTH TWICE A  Tablet 2    rosuvastatin (CRESTOR) 20 MG Tab Take 1 Tablet by mouth every day. 100 Tablet 3    Insulin Glargine, 1 Unit Dial, (TOUJEO SOLOSTAR) 300 UNIT/ML Solution Pen-injector Inject 50 Units under the skin every day. 15 mL 3     "Continuous Blood Gluc  (FREESTYLE ETELVINA 2 READER) Device 1 Each continuous. 1 Each 0    Continuous Blood Gluc Sensor (FREESTYLE ETELVINA 2 SENSOR) Misc 1 Each every 14 days. 6 Each 3    Multiple Vitamin (MULTIVITAMIN ADULT PO) Take  by mouth.      Telmisartan-amLODIPine 80-5 MG Tab Take  by mouth.      aspirin 81 MG tablet Take 81 mg by mouth every day.       Current Facility-Administered Medications Ordered in Epic   Medication Dose Route Frequency Provider Last Rate Last Admin    triamcinolone acetonide (KENALOG-40) injection 40 mg  40 mg Intramuscular Once Link Polanco III, M.D.           Allergies:  Amoxicillin    Health Maintenance: Completed    ROS:  Gen: no fevers/chills, no changes in weight  Eyes: no changes in vision  ENT: no sore throat, no hearing loss, no bloody nose  Pulm: no sob,   CV: no chest pain, no palpitations  GI: no nausea/vomiting, no diarrhea  : no dysuria  MSk: no myalgias  Skin: no rash  Neuro: no headaches, no numbness/tingling  Heme/Lymph: no easy bruising      Objective:       Exam:  /72 (BP Location: Right arm, Patient Position: Sitting, BP Cuff Size: Adult)   Pulse 94   Temp 36.4 °C (97.6 °F) (Temporal)   Resp 16   Ht 1.6 m (5' 3\")   Wt 95.3 kg (210 lb)   LMP 01/01/2009   SpO2 97%   BMI 37.20 kg/m²  Body mass index is 37.2 kg/m².    Gen: Alert and oriented, No apparent distress.  ENT:    Ear canals and TMs are clear.  Nose has moderate to severe congestion with clear and white drainage.  Throat is clear.  Sinuses are nontender.  Neck: Neck is supple without lymphadenopathy.  Lungs: Normal effort, CTA bilaterally, no wheezes, rhonchi, or rales.  She does cough occasionally during the visit and it is nonproductive.    Assessment & Plan:     68 y.o. female with the following -     1. Seasonal allergic rhinitis due to pollen  This is a exacerbation of a chronic problem.  I will have her continue on her Flonase and antihistamines.  After discussion we will give her a " Kenalog 40 mg IM shot for her allergies and see if this clears it up.  If she is not better toward in the week she is to let me know.  She was warned that it may temporarily increase her sugar levels.      Return if symptoms worsen or fail to improve.    Please note that this dictation was created using voice recognition software. I have made every reasonable attempt to correct obvious errors, but I expect that there are errors of grammar and possibly content that I did not discover before finalizing the note.

## 2023-06-28 DIAGNOSIS — Z79.4 TYPE 2 DIABETES MELLITUS WITH HYPERGLYCEMIA, WITH LONG-TERM CURRENT USE OF INSULIN (HCC): ICD-10-CM

## 2023-06-28 DIAGNOSIS — E11.65 TYPE 2 DIABETES MELLITUS WITH HYPERGLYCEMIA, WITH LONG-TERM CURRENT USE OF INSULIN (HCC): ICD-10-CM

## 2023-06-29 RX ORDER — DULAGLUTIDE 4.5 MG/.5ML
INJECTION, SOLUTION SUBCUTANEOUS
Qty: 6 ML | Refills: 2 | Status: SHIPPED | OUTPATIENT
Start: 2023-06-29 | End: 2024-03-11

## 2023-06-29 NOTE — TELEPHONE ENCOUNTER
Received request via: Pharmacy    Was the patient seen in the last year in this department? Yes    Does the patient have an active prescription (recently filled or refills available) for medication(s) requested? No    Does the patient have long-term Plus and need 100 day supply (blood pressure, diabetes and cholesterol meds only)? Patient does not have SCP

## 2023-08-11 PROBLEM — E78.5 HYPERLIPIDEMIA ASSOCIATED WITH TYPE 2 DIABETES MELLITUS (HCC): Status: RESOLVED | Noted: 2022-08-01 | Resolved: 2023-08-11

## 2023-08-11 PROBLEM — E11.9 DIABETES MELLITUS WITH COINCIDENT HYPERTENSION (HCC): Status: RESOLVED | Noted: 2022-08-01 | Resolved: 2023-08-11

## 2023-08-11 PROBLEM — R76.8 RHEUMATOID FACTOR POSITIVE: Status: ACTIVE | Noted: 2023-08-11

## 2023-08-11 PROBLEM — E11.29 MICROALBUMINURIA DUE TO TYPE 2 DIABETES MELLITUS (HCC): Status: ACTIVE | Noted: 2023-08-11

## 2023-08-11 PROBLEM — E11.69 HYPERLIPIDEMIA ASSOCIATED WITH TYPE 2 DIABETES MELLITUS (HCC): Status: RESOLVED | Noted: 2022-08-01 | Resolved: 2023-08-11

## 2023-08-11 PROBLEM — Z79.4 LONG-TERM INSULIN USE (HCC): Status: RESOLVED | Noted: 2022-05-09 | Resolved: 2023-08-11

## 2023-08-11 PROBLEM — I10 DIABETES MELLITUS WITH COINCIDENT HYPERTENSION (HCC): Status: RESOLVED | Noted: 2022-08-01 | Resolved: 2023-08-11

## 2023-08-11 PROBLEM — R80.9 MICROALBUMINURIA: Status: ACTIVE | Noted: 2023-08-11

## 2023-09-13 ENCOUNTER — HOSPITAL ENCOUNTER (OUTPATIENT)
Dept: LAB | Facility: MEDICAL CENTER | Age: 68
End: 2023-09-13
Attending: INTERNAL MEDICINE
Payer: MEDICARE

## 2023-09-13 ENCOUNTER — HOSPITAL ENCOUNTER (OUTPATIENT)
Dept: RADIOLOGY | Facility: MEDICAL CENTER | Age: 68
End: 2023-09-13
Attending: PHYSICIAN ASSISTANT
Payer: MEDICARE

## 2023-09-13 ENCOUNTER — OFFICE VISIT (OUTPATIENT)
Dept: URGENT CARE | Facility: PHYSICIAN GROUP | Age: 68
End: 2023-09-13
Payer: MEDICARE

## 2023-09-13 VITALS
OXYGEN SATURATION: 97 % | HEIGHT: 63 IN | RESPIRATION RATE: 18 BRPM | BODY MASS INDEX: 36.86 KG/M2 | TEMPERATURE: 97.9 F | DIASTOLIC BLOOD PRESSURE: 70 MMHG | SYSTOLIC BLOOD PRESSURE: 130 MMHG | WEIGHT: 208 LBS | HEART RATE: 82 BPM

## 2023-09-13 DIAGNOSIS — Z87.442 HISTORY OF NEPHROLITHIASIS: ICD-10-CM

## 2023-09-13 DIAGNOSIS — N20.0: ICD-10-CM

## 2023-09-13 DIAGNOSIS — R10.9 LEFT FLANK PAIN: ICD-10-CM

## 2023-09-13 DIAGNOSIS — I10 ESSENTIAL HYPERTENSION: ICD-10-CM

## 2023-09-13 DIAGNOSIS — E08.21 DIABETIC NEPHROPATHY ASSOCIATED WITH DIABETES MELLITUS DUE TO UNDERLYING CONDITION (HCC): ICD-10-CM

## 2023-09-13 DIAGNOSIS — Z79.4 LONG-TERM INSULIN USE (HCC): ICD-10-CM

## 2023-09-13 DIAGNOSIS — E55.9 VITAMIN D DEFICIENCY: ICD-10-CM

## 2023-09-13 DIAGNOSIS — Z79.4 TYPE 2 DIABETES MELLITUS WITH HYPERGLYCEMIA, WITH LONG-TERM CURRENT USE OF INSULIN (HCC): ICD-10-CM

## 2023-09-13 DIAGNOSIS — E78.5 DYSLIPIDEMIA: ICD-10-CM

## 2023-09-13 DIAGNOSIS — E11.65 TYPE 2 DIABETES MELLITUS WITH HYPERGLYCEMIA, WITH LONG-TERM CURRENT USE OF INSULIN (HCC): ICD-10-CM

## 2023-09-13 LAB
25(OH)D3 SERPL-MCNC: 49 NG/ML (ref 30–100)
APPEARANCE UR: CLEAR
BILIRUB UR STRIP-MCNC: NEGATIVE MG/DL
COLOR UR AUTO: YELLOW
CREAT UR-MCNC: 102.04 MG/DL
GLUCOSE UR STRIP.AUTO-MCNC: NEGATIVE MG/DL
KETONES UR STRIP.AUTO-MCNC: NEGATIVE MG/DL
LEUKOCYTE ESTERASE UR QL STRIP.AUTO: NEGATIVE
MICROALBUMIN UR-MCNC: 92.6 MG/DL
MICROALBUMIN/CREAT UR: 907 MG/G (ref 0–30)
NITRITE UR QL STRIP.AUTO: NEGATIVE
PH UR STRIP.AUTO: 5.5 [PH] (ref 5–8)
PROT UR QL STRIP: NORMAL MG/DL
RBC UR QL AUTO: NORMAL
SP GR UR STRIP.AUTO: >=1.03
T4 FREE SERPL-MCNC: 1.26 NG/DL (ref 0.93–1.7)
TSH SERPL DL<=0.005 MIU/L-ACNC: 2 UIU/ML (ref 0.38–5.33)
UROBILINOGEN UR STRIP-MCNC: NORMAL MG/DL

## 2023-09-13 PROCEDURE — 84439 ASSAY OF FREE THYROXINE: CPT

## 2023-09-13 PROCEDURE — 80053 COMPREHEN METABOLIC PANEL: CPT

## 2023-09-13 PROCEDURE — 74176 CT ABD & PELVIS W/O CONTRAST: CPT

## 2023-09-13 PROCEDURE — 99214 OFFICE O/P EST MOD 30 MIN: CPT | Performed by: PHYSICIAN ASSISTANT

## 2023-09-13 PROCEDURE — 82306 VITAMIN D 25 HYDROXY: CPT

## 2023-09-13 PROCEDURE — 81002 URINALYSIS NONAUTO W/O SCOPE: CPT | Performed by: PHYSICIAN ASSISTANT

## 2023-09-13 PROCEDURE — 82043 UR ALBUMIN QUANTITATIVE: CPT

## 2023-09-13 PROCEDURE — 80061 LIPID PANEL: CPT

## 2023-09-13 PROCEDURE — 3078F DIAST BP <80 MM HG: CPT | Performed by: PHYSICIAN ASSISTANT

## 2023-09-13 PROCEDURE — 84443 ASSAY THYROID STIM HORMONE: CPT

## 2023-09-13 PROCEDURE — 36415 COLL VENOUS BLD VENIPUNCTURE: CPT

## 2023-09-13 PROCEDURE — 3075F SYST BP GE 130 - 139MM HG: CPT | Performed by: PHYSICIAN ASSISTANT

## 2023-09-13 PROCEDURE — 82570 ASSAY OF URINE CREATININE: CPT

## 2023-09-13 RX ORDER — KETOROLAC TROMETHAMINE 30 MG/ML
30 INJECTION, SOLUTION INTRAMUSCULAR; INTRAVENOUS ONCE
Status: COMPLETED | OUTPATIENT
Start: 2023-09-13 | End: 2023-09-13

## 2023-09-13 RX ADMIN — KETOROLAC TROMETHAMINE 30 MG: 30 INJECTION, SOLUTION INTRAMUSCULAR; INTRAVENOUS at 09:24

## 2023-09-13 ASSESSMENT — FIBROSIS 4 INDEX: FIB4 SCORE: 0.62

## 2023-09-13 ASSESSMENT — ENCOUNTER SYMPTOMS
HEADACHES: 0
NAUSEA: 1
DIAPHORESIS: 0
CHILLS: 0
ABDOMINAL PAIN: 0
FEVER: 1
FLANK PAIN: 1
DIZZINESS: 0
VOMITING: 0

## 2023-09-13 NOTE — PROGRESS NOTES
Subjective:     CHIEF COMPLAINT  Chief Complaint   Patient presents with    Back Pain     X 3 days Low back pain, fever. HX of kidney stones       HPI  Zena Hui is a very pleasant 68 y.o. female who presents to the clinic with left flank pain x3 days.  Patient describes intermittent sharp stabbing sensations over the left flank.  The pain does not radiate.  Last night it felt like she was running a fever.  Also experiencing mild nausea without emesis.  Symptoms feel similar to previous kidney stones.  She has had multiple stones in the past and on a few occasions they required lithotripsy.  She is followed by urology and receives yearly imaging studies.  She was told she had residual stones in her left kidney at her last visit in May.  States she continues to pass urine without complication.  Denies any dysuria.    REVIEW OF SYSTEMS  Review of Systems   Constitutional:  Positive for fever (Subjective). Negative for chills, diaphoresis and malaise/fatigue.   Gastrointestinal:  Positive for nausea. Negative for abdominal pain and vomiting.   Genitourinary:  Positive for flank pain and hematuria.   Neurological:  Negative for dizziness and headaches.       PAST MEDICAL HISTORY  Patient Active Problem List    Diagnosis Date Noted    Microalbuminuria due to type 2 diabetes mellitus ([facility]) 08/11/2023    Rheumatoid factor positive 08/11/2023    Seasonal allergic rhinitis due to pollen 06/19/2023    Diabetic nephropathy associated with diabetes mellitus due to underlying condition ([facility]) 05/18/2023    Bowel habit changes 05/16/2023    Arthralgia 05/16/2023    Type 2 diabetes mellitus with microalbuminuria, with long-term current use of insulin ([facility]) 02/28/2023    Dyslipidemia 05/09/2022    Long-term insulin use ([facility]) 05/09/2022    Severe obesity (BMI 35.0-39.9) with comorbidity ([facility]) 11/03/2021    Type 2 diabetes mellitus with hyperglycemia, with long-term current use of insulin ([facility]) 01/12/2012    Vitamin d  deficiency 03/11/2010    Essential hypertension 06/17/2009    Osteopenia 06/17/2009    JASPER on CPAP 06/17/2009       SURGICAL HISTORY   has a past surgical history that includes embolectomy (1998); colonoscopy with polyp (10/2007); cystoscopy (N/A, 07/05/2019); ureteroscopy (Right, 07/05/2019); lasertripsy (Right, 07/05/2019); stent placement (Right, 07/05/2019); other orthopedic surgery (N/A, 04/1998); other (07/2019); cystoscopy,insert ureteral stent (Bilateral, 07/05/2021); cysto/uretero/pyeloscopy, dx (Bilateral, 07/05/2021); lasertripsy (Bilateral, 07/05/2021); and laminotomy.    ALLERGIES  Allergies   Allergen Reactions    Amoxicillin Vomiting     Per patient happened only 1 time.        CURRENT MEDICATIONS  Home Medications       Reviewed by Nhan Jimenez P.A.-C. (Physician Assistant) on 09/13/23 at 0907  Med List Status: <None>     Medication Last Dose Status   aspirin 81 MG tablet Taking Active   B Complex Vitamins (VITAMIN B COMPLEX PO) Taking Active   Continuous Blood Gluc  (FREESTYLE ETELVINA 2 READER) Device Taking Active   Continuous Blood Gluc Sensor (FREESTYLE ETELVINA 2 SENSOR) Misc Taking Active   Finerenone (KERENDIA) 10 MG Tab  Active   glimepiride (AMARYL) 4 MG Tab Taking Active   Insulin Glargine, 1 Unit Dial, (TOUJEO SOLOSTAR) 300 UNIT/ML Solution Pen-injector Taking Active   metformin (GLUCOPHAGE) 1000 MG tablet Taking Active   Multiple Vitamin (MULTIVITAMIN ADULT PO) Taking Active   rosuvastatin (CRESTOR) 20 MG Tab Taking Active   Telmisartan-amLODIPine 80-5 MG Tab Taking Active   TRULICITY 4.5 MG/0.5ML Solution Pen-injector Taking Active   VITAMIN D PO Taking Active                    SOCIAL HISTORY  Social History     Tobacco Use    Smoking status: Never    Smokeless tobacco: Never   Vaping Use    Vaping Use: Never used   Substance and Sexual Activity    Alcohol use: Never    Drug use: Never    Sexual activity: Not Currently     Partners: Male     Birth control/protection:  "Post-Menopausal       FAMILY HISTORY  Family History   Problem Relation Age of Onset    Arthritis Mother     Lung Disease Mother         COPD    Hypertension Mother     Hyperlipidemia Mother     Heart Disease Father          @ 41 from heart attack    Hyperlipidemia Father     Heart Disease Brother         CAD   S/P CABG    Cancer Maternal Grandmother         Pancreatic cancer    Cancer Brother         Prostate cancer          Objective:     VITAL SIGNS: /70 (BP Location: Left arm, Patient Position: Sitting, BP Cuff Size: Adult)   Pulse 82   Temp 36.6 °C (97.9 °F) (Temporal)   Resp 18   Ht 1.6 m (5' 3\")   Wt 94.3 kg (208 lb)   LMP 2009   SpO2 97%   BMI 36.85 kg/m²     PHYSICAL EXAM  Physical Exam  Constitutional:       Appearance: Normal appearance.   HENT:      Head: Normocephalic and atraumatic.   Eyes:      Conjunctiva/sclera: Conjunctivae normal.   Cardiovascular:      Rate and Rhythm: Normal rate and regular rhythm.      Pulses: Normal pulses.      Heart sounds: Normal heart sounds.   Pulmonary:      Effort: Pulmonary effort is normal.   Abdominal:      General: Bowel sounds are normal.      Tenderness: There is no abdominal tenderness. There is no right CVA tenderness, left CVA tenderness or guarding.   Musculoskeletal:      Cervical back: Normal range of motion.   Neurological:      General: No focal deficit present.      Mental Status: She is alert and oriented to person, place, and time. Mental status is at baseline.       Lab Results/POC Test Results   Results for orders placed or performed in visit on 23   POCT Urinalysis   Result Value Ref Range    POC Color YELLOW Negative    POC Appearance CLEAR Negative    POC Glucose NEGATIVE Negative mg/dL    POC Bilirubin NEGATIVE Negative mg/dL    POC Ketones NEGATIVE Negative mg/dL    POC Specific Gravity >=1.030 <1.005 - >1.030    POC Blood TRACE-LYSED Negative    POC Urine PH 5.5 5.0 - 8.0    POC Protein >=300 mg/dL Negative mg/dL "    POC Urobiligen 0.2 E.U./dL Negative (0.2) mg/dL    POC Nitrites NEGATIVE Negative    POC Leukocyte Esterase NEGATIVE Negative           RADIOLOGY RESULTS   CT-RENAL COLIC EVALUATION(A/P W/O)    Result Date: 9/13/2023 9/13/2023 2:24 PM HISTORY/REASON FOR EXAM:  Left flank pain. TECHNIQUE/EXAM DESCRIPTION AND NUMBER OF VIEWS: CT scan renal/colic without contrast. 5 mm helical images of the abdomen and pelvis were obtained from the diaphragmatic domes through the pubic symphysis. Low dose optimization technique was utilized for this CT exam including automated exposure control and adjustment of the mA and/or kV according to patient size. COMPARISON: FINDINGS: Abdomen: There is no evidence of focal consolidation or pleural effusion within the lung bases. No large masses are seen within the upper abdomen. There are multiple bilateral renal calyceal stones which measure up to 5 mm in size bilaterally. Largest stone is on the left and measures 740 Hounsfield units. No evidence of ureteral stone or hydronephrosis. Pelvis: There is sigmoid diverticulosis. There is vascular calcification. There is no evidence of inflammatory change seen in the region of the bowel. There is no evidence of free fluid.     1.  Multiple bilateral renal calyceal stones which measure up to 5 mm in size. No evidence of ureteral stone or hydronephrosis. 2.  Sigmoid diverticulosis.           Assessment/Plan:     1. Left flank pain  POCT Urinalysis    ketorolac (Toradol) injection 30 mg    CT-RENAL COLIC EVALUATION(A/P W/O)      2. Calyceal renal calculus        3. History of nephrolithiasis  POCT Urinalysis    ketorolac (Toradol) injection 30 mg    CT-RENAL COLIC EVALUATION(A/P W/O)          MDM/Comments:    Patient presents to the clinic today with left flank pain x3 days.  Describes intermittent sharp stabbing sensations without radiation.  Long history of nephrolithiasis in the past.  She has required lithotripsy on a few separate occasions.   At this time I do have concern for potential stone.  We will send out for outpatient CT renal colic and I will follow-up once available.  Urine demonstrates no signs of infection.  Vitals stable and reassuring.    CT scan findings discussed with the patient.  CT scan demonstrates bilateral renal stones.  No evidence of ureterolithiasis or hydronephrosis.  Findings appear consistent with prior imaging studies as performed by [facility] [place].  She noted moderate improvement after Toradol in clinic.  Advised continued increased fluid intake.  Alternate Tylenol and ibuprofen as discussed.  Heating pack use encouraged.  Advised reaching out to [facility] [place] for further management if symptoms persist.  ED precautions discussed.    Differential diagnosis, natural history, supportive care, and indications for immediate follow-up discussed. All questions answered. Patient agrees with the plan of care.    Follow-up as needed if symptoms worsen or fail to improve to PCP, Urgent care or Emergency Room.    I have personally reviewed prior external notes and test results pertinent to today's visit.  I have independently reviewed and interpreted all diagnostics ordered during this urgent care acute visit.   Discussed management options (risks,benefits, and alternatives to treatment). Pt expresses understanding and the treatment plan was agreed upon. Questions were encouraged and answered to pt's satisfaction.    Please note that this dictation was created using voice recognition software. I have made a reasonable attempt to correct obvious errors, but I expect that there are errors of grammar and possibly content that I did not discover before finalizing the note.

## 2023-09-14 LAB
ALBUMIN SERPL BCP-MCNC: 4.5 G/DL (ref 3.2–4.9)
ALBUMIN/GLOB SERPL: 1.6 G/DL
ALP SERPL-CCNC: 125 U/L (ref 30–99)
ALT SERPL-CCNC: 20 U/L (ref 2–50)
ANION GAP SERPL CALC-SCNC: 13 MMOL/L (ref 7–16)
AST SERPL-CCNC: 16 U/L (ref 12–45)
BILIRUB SERPL-MCNC: 0.5 MG/DL (ref 0.1–1.5)
BUN SERPL-MCNC: 13 MG/DL (ref 8–22)
CALCIUM ALBUM COR SERPL-MCNC: 9.3 MG/DL (ref 8.5–10.5)
CALCIUM SERPL-MCNC: 9.7 MG/DL (ref 8.5–10.5)
CHLORIDE SERPL-SCNC: 105 MMOL/L (ref 96–112)
CHOLEST SERPL-MCNC: 140 MG/DL (ref 100–199)
CO2 SERPL-SCNC: 23 MMOL/L (ref 20–33)
CREAT SERPL-MCNC: 0.61 MG/DL (ref 0.5–1.4)
FASTING STATUS PATIENT QL REPORTED: NORMAL
GFR SERPLBLD CREATININE-BSD FMLA CKD-EPI: 97 ML/MIN/1.73 M 2
GLOBULIN SER CALC-MCNC: 2.8 G/DL (ref 1.9–3.5)
GLUCOSE SERPL-MCNC: 120 MG/DL (ref 65–99)
HDLC SERPL-MCNC: 57 MG/DL
LDLC SERPL CALC-MCNC: 63 MG/DL
POTASSIUM SERPL-SCNC: 4 MMOL/L (ref 3.6–5.5)
PROT SERPL-MCNC: 7.3 G/DL (ref 6–8.2)
SODIUM SERPL-SCNC: 141 MMOL/L (ref 135–145)
TRIGL SERPL-MCNC: 101 MG/DL (ref 0–149)

## 2023-09-18 ENCOUNTER — PATIENT MESSAGE (OUTPATIENT)
Dept: MEDICAL GROUP | Facility: PHYSICIAN GROUP | Age: 68
End: 2023-09-18
Payer: MEDICARE

## 2023-09-19 ENCOUNTER — NON-PROVIDER VISIT (OUTPATIENT)
Dept: ENDOCRINOLOGY | Facility: MEDICAL CENTER | Age: 68
End: 2023-09-19
Attending: INTERNAL MEDICINE
Payer: MEDICARE

## 2023-09-19 VITALS
WEIGHT: 207 LBS | OXYGEN SATURATION: 93 % | BODY MASS INDEX: 36.68 KG/M2 | HEART RATE: 72 BPM | DIASTOLIC BLOOD PRESSURE: 70 MMHG | HEIGHT: 63 IN | SYSTOLIC BLOOD PRESSURE: 128 MMHG

## 2023-09-19 DIAGNOSIS — E11.65 TYPE 2 DIABETES MELLITUS WITH HYPERGLYCEMIA, WITH LONG-TERM CURRENT USE OF INSULIN (HCC): ICD-10-CM

## 2023-09-19 DIAGNOSIS — Z79.4 TYPE 2 DIABETES MELLITUS WITH HYPERGLYCEMIA, WITH LONG-TERM CURRENT USE OF INSULIN (HCC): ICD-10-CM

## 2023-09-19 LAB
HBA1C MFR BLD: 7.9 % (ref ?–5.8)
POCT INT CON NEG: NEGATIVE
POCT INT CON POS: POSITIVE

## 2023-09-19 PROCEDURE — 99213 OFFICE O/P EST LOW 20 MIN: CPT | Performed by: INTERNAL MEDICINE

## 2023-09-19 PROCEDURE — 83036 HEMOGLOBIN GLYCOSYLATED A1C: CPT

## 2023-09-19 ASSESSMENT — FIBROSIS 4 INDEX: FIB4 SCORE: 0.63

## 2023-09-19 NOTE — PROGRESS NOTES
RN-CDE Note    Subjective:   Endocrinology Clinic Progress Note  PCP: Link Polanco III, M.D.    HPI:  Zena Hui is a 68 y.o. old patient who is seen today by the Diabetes Nurse Specialist for review of Type 2 Diabetes.  Recent changes in health: Health good.  DM:   Last A1c:   Lab Results   Component Value Date/Time    HBA1C 7.9 (A) 09/19/2023 08:24 AM      Previous A1c was 8.2 on 5/18/23  A1C GOAL: < 7    Diabetes Medications:   Toujeo 50 units daily  Trulicity 4.5 mg weekly  Metformin 1000 mg BID  Glimepiride 4 mg BID        Exercise: Having a hard walking with foot pain.  Doing stretching with bands.  Diet: Breakfast is eggs, sausage and toast or cereal.  Lunch is protein or hamburger.  Dinner is protein, vegetables, and carbohydrates.  Snacks on peanut butter and toast or protein and crackers.  Patient's body mass index is 36.67 kg/m². Exercise and nutrition counseling were performed at this visit.    Glucose monitoring frequency: See Christina download    Hypoglycemic episodes: yes - during the night  Last Retinal Exam: on file and up-to-date  Daily Foot Exam: Yes   Foot Exam:  Monofilament: done  Monofilament testing with a 10 gram force: sensation intact: intact bilaterally  Visual Inspection: Feet without maceration, ulcers, fissures.  Left heel pain.  Pedal pulses: intact bilaterally   Lab Results   Component Value Date/Time    MALBCRT 907 (H) 09/13/2023 09:55 AM    MICROALBUR 92.6 09/13/2023 09:55 AM        ACR Albumin/Creatinine Ratio goal <30     HTN:   Blood pressure goal <130/<80 .   Currently Rx ACE/ARB: Yes     Dyslipidemia:    Lab Results   Component Value Date/Time    CHOLSTRLTOT 140 09/13/2023 09:45 AM    LDL 63 09/13/2023 09:45 AM    HDL 57 09/13/2023 09:45 AM    TRIGLYCERIDE 101 09/13/2023 09:45 AM         Currently Rx Statin: Yes     She  reports that she has never smoked. She has never used smokeless tobacco.      Plan:     Discussed and educated on:   - All medications, side effects and  compliance (discussed carefully)  - Annual eye examinations at Ophthalmology  - Home glucose monitoring emphasized  - Weight control and daily exercise    Recommended medication changes: She will contact the [employer]  rep to have education so that she can start using it before meals.  She will follow up with me in 3 months and Dr. Timmons in 6 months.

## 2023-09-25 RX ORDER — AMLODIPINE AND VALSARTAN 10; 320 MG/1; MG/1
1 TABLET ORAL DAILY
Qty: 100 TABLET | Refills: 1 | Status: SHIPPED | OUTPATIENT
Start: 2023-09-25 | End: 2024-02-21

## 2023-09-25 RX ORDER — AMLODIPINE AND VALSARTAN 10; 320 MG/1; MG/1
1 TABLET ORAL DAILY
COMMUNITY
End: 2023-09-25 | Stop reason: SDUPTHER

## 2023-10-04 ENCOUNTER — OFFICE VISIT (OUTPATIENT)
Dept: MEDICAL GROUP | Facility: PHYSICIAN GROUP | Age: 68
End: 2023-10-04
Payer: MEDICARE

## 2023-10-04 VITALS
DIASTOLIC BLOOD PRESSURE: 74 MMHG | SYSTOLIC BLOOD PRESSURE: 128 MMHG | HEART RATE: 74 BPM | OXYGEN SATURATION: 98 % | RESPIRATION RATE: 18 BRPM | BODY MASS INDEX: 36.5 KG/M2 | HEIGHT: 63 IN | TEMPERATURE: 97.6 F | WEIGHT: 206 LBS

## 2023-10-04 DIAGNOSIS — R76.8 RHEUMATOID FACTOR POSITIVE: ICD-10-CM

## 2023-10-04 DIAGNOSIS — M72.2 PLANTAR FASCIITIS OF LEFT FOOT: ICD-10-CM

## 2023-10-04 DIAGNOSIS — Z12.31 ENCOUNTER FOR SCREENING MAMMOGRAM FOR MALIGNANT NEOPLASM OF BREAST: ICD-10-CM

## 2023-10-04 PROCEDURE — 3078F DIAST BP <80 MM HG: CPT | Performed by: FAMILY MEDICINE

## 2023-10-04 PROCEDURE — 99213 OFFICE O/P EST LOW 20 MIN: CPT | Performed by: FAMILY MEDICINE

## 2023-10-04 PROCEDURE — 3074F SYST BP LT 130 MM HG: CPT | Performed by: FAMILY MEDICINE

## 2023-10-04 ASSESSMENT — FIBROSIS 4 INDEX: FIB4 SCORE: 0.63

## 2023-10-04 NOTE — ASSESSMENT & PLAN NOTE
This is now a chronic problem.  She had a positive rheumatoid factor earlier this year but never made it to the rheumatologist due to some family issues.  She like to get the information so she can go see them now.  She is starting to feel some arthralgias in her hands.

## 2023-10-04 NOTE — ASSESSMENT & PLAN NOTE
This is a screening test.  Patient's last mammogram was 2 years ago.  New mammogram will be ordered.

## 2023-10-04 NOTE — PROGRESS NOTES
Subjective:     CC: Here for several issues.    HPI:   Zena presents today with the following medical concerns:    Plantar fasciitis of left foot  This is an acute problem.  Patient's been having pain to the bottom of her left foot for several weeks to months.  She has had this many years ago and just recent recur.  It feels better when she is wearing very comfortable shoes but it still bothers her.  No recent injury.  She is tried over-the-counter Advil but only 1 a day.    Rheumatoid factor positive  This is now a chronic problem.  She had a positive rheumatoid factor earlier this year but never made it to the rheumatologist due to some family issues.  She like to get the information so she can go see them now.  She is starting to feel some arthralgias in her hands.    Encounter for screening mammogram for malignant neoplasm of breast  This is a screening test.  Patient's last mammogram was 2 years ago.  New mammogram will be ordered.    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    Bronchitis     Essential hypertension, benign     High cholesterol     Hyperlipidemia     Mixed hyperlipidemia     Organic sleep apnea, unspecified     Osteopenia     Renal disorder 04/2019    kidney stones    Sleep apnea     Type II or unspecified type diabetes mellitus without mention of complication, not stated as uncontrolled     oral meds       Social History     Tobacco Use    Smoking status: Never    Smokeless tobacco: Never   Vaping Use    Vaping Use: Never used   Substance Use Topics    Alcohol use: Never    Drug use: Never       Current Outpatient Medications Ordered in Epic   Medication Sig Dispense Refill    amlodipine-valsartan (EXFORGE)  MG per tablet Take 1 Tablet by mouth every day. 100 Tablet 1    B Complex Vitamins (VITAMIN B COMPLEX PO) Take  by mouth. Once daily      VITAMIN D PO Take  by mouth. Once dialy      TRULICITY 4.5  "MG/0.5ML Solution Pen-injector INJECT 4.5 MG UNDER THE SKIN ONCE WEEKLY 6 mL 2    metformin (GLUCOPHAGE) 1000 MG tablet TAKE 1 TABLET BY MOUTH TWICE A DAY WITH MEALS 180 Tablet 1    glimepiride (AMARYL) 4 MG Tab TAKE 1 TABLET BY MOUTH TWICE A  Tablet 2    rosuvastatin (CRESTOR) 20 MG Tab Take 1 Tablet by mouth every day. 100 Tablet 3    Insulin Glargine, 1 Unit Dial, (TOUJEO SOLOSTAR) 300 UNIT/ML Solution Pen-injector Inject 50 Units under the skin every day. 15 mL 3    Continuous Blood Gluc  (FREESTYLE ETELVINA 2 READER) Device 1 Each continuous. 1 Each 0    Continuous Blood Gluc Sensor (FREESTYLE ETELVINA 2 SENSOR) Misc 1 Each every 14 days. 6 Each 3    Multiple Vitamin (MULTIVITAMIN ADULT PO) Take  by mouth.      Telmisartan-amLODIPine 80-5 MG Tab Take  by mouth.      aspirin 81 MG tablet Take 81 mg by mouth every day.       No current [facility]-ordered facility-administered medications on file.       Allergies:  Amoxicillin    Health Maintenance: Completed    ROS:  Gen: no fevers/chills, no changes in weight  Eyes: no changes in vision.  She has been having an itchy right eye.  ENT: no sore throat, no hearing loss, no bloody nose  Pulm: no sob, no cough  CV: no chest pain, no palpitations  GI: no nausea/vomiting, no diarrhea  : no dysuria  Skin: no rash  Neuro: no headaches, no numbness/tingling  Heme/Lymph: no easy bruising      Objective:       Exam:  /74 (BP Location: Right arm, Patient Position: Sitting, BP Cuff Size: Adult)   Pulse 74   Temp 36.4 °C (97.6 °F) (Temporal)   Resp 18   Ht 1.6 m (5' 3\")   Wt 93.4 kg (206 lb)   LMP 01/01/2009   SpO2 98%   BMI 36.49 kg/m²  Body mass index is 36.49 kg/m².    Gen: Alert and oriented, No apparent distress.  Eyes:   Extraocular motions intact.  No scleral icterus seen.  No inflammation to the conjunctiva seen.  Ext: No clubbing, cyanosis, edema.  She has pain and tenderness to the base of the left calcaneus where the plantar tendon attaches.  No " swelling is noted.      Labs: I did review her last hemoglobin A1c with her.    Assessment & Plan:     68 y.o. female with the following -     1. Encounter for screening mammogram for malignant neoplasm of breast  This is a screening issue.  Mammogram ordered.  - MA-SCREENING MAMMO BILAT W/TOMOSYNTHESIS W/CAD; Future    2. Plantar fasciitis of left foot  This is an acute problem.  She has failed the conservative measures she is tried already so we will refer her to podiatry.  In the meantime I told her to try LowDye taping as well as Voltaren gel to the painful area.  Also she could try taking Aleve 1-2 twice a days would be less pills for her.  - Referral to Podiatry    3. Rheumatoid factor positive  This is now a chronic problem.  The information for rheumatology referral was given to her to set up her appointment.      Return in about 6 months (around 4/4/2024) for Long.    Please note that this dictation was created using voice recognition software. I have made every reasonable attempt to correct obvious errors, but I expect that there are errors of grammar and possibly content that I did not discover before finalizing the note.

## 2023-10-04 NOTE — ASSESSMENT & PLAN NOTE
This is an acute problem.  Patient's been having pain to the bottom of her left foot for several weeks to months.  She has had this many years ago and just recent recur.  It feels better when she is wearing very comfortable shoes but it still bothers her.  No recent injury.  She is tried over-the-counter Advil but only 1 a day.

## 2023-10-19 ENCOUNTER — HOSPITAL ENCOUNTER (OUTPATIENT)
Dept: RADIOLOGY | Facility: MEDICAL CENTER | Age: 68
End: 2023-10-19
Attending: NURSE PRACTITIONER
Payer: MEDICARE

## 2023-10-19 ENCOUNTER — HOSPITAL ENCOUNTER (OUTPATIENT)
Dept: RADIOLOGY | Facility: MEDICAL CENTER | Age: 68
End: 2023-10-19
Attending: FAMILY MEDICINE
Payer: MEDICARE

## 2023-10-19 ENCOUNTER — OFFICE VISIT (OUTPATIENT)
Dept: URGENT CARE | Facility: PHYSICIAN GROUP | Age: 68
End: 2023-10-19
Payer: MEDICARE

## 2023-10-19 VITALS
WEIGHT: 207 LBS | OXYGEN SATURATION: 97 % | RESPIRATION RATE: 16 BRPM | DIASTOLIC BLOOD PRESSURE: 70 MMHG | BODY MASS INDEX: 36.68 KG/M2 | HEIGHT: 63 IN | HEART RATE: 82 BPM | SYSTOLIC BLOOD PRESSURE: 122 MMHG | TEMPERATURE: 97.1 F

## 2023-10-19 DIAGNOSIS — M25.561 ACUTE PAIN OF RIGHT KNEE: ICD-10-CM

## 2023-10-19 DIAGNOSIS — M17.11 PRIMARY OSTEOARTHRITIS OF RIGHT KNEE: Primary | ICD-10-CM

## 2023-10-19 DIAGNOSIS — S86.911A STRAIN OF RIGHT KNEE, INITIAL ENCOUNTER: ICD-10-CM

## 2023-10-19 DIAGNOSIS — Z12.31 ENCOUNTER FOR SCREENING MAMMOGRAM FOR MALIGNANT NEOPLASM OF BREAST: ICD-10-CM

## 2023-10-19 PROCEDURE — 3074F SYST BP LT 130 MM HG: CPT | Performed by: NURSE PRACTITIONER

## 2023-10-19 PROCEDURE — 3078F DIAST BP <80 MM HG: CPT | Performed by: NURSE PRACTITIONER

## 2023-10-19 PROCEDURE — 77063 BREAST TOMOSYNTHESIS BI: CPT

## 2023-10-19 PROCEDURE — 99214 OFFICE O/P EST MOD 30 MIN: CPT | Performed by: NURSE PRACTITIONER

## 2023-10-19 PROCEDURE — 73562 X-RAY EXAM OF KNEE 3: CPT | Mod: RT

## 2023-10-19 ASSESSMENT — FIBROSIS 4 INDEX: FIB4 SCORE: 0.63

## 2023-10-19 NOTE — PROGRESS NOTES
Zena Hui is a 68 y.o. female who presents for Knee Injury (X3days R knee swelling and painful, twisted knee)      HPI  This is a new problem. Zena Hui is a 68 y.o. patient who presents to urgent care with c/o: She was kneeling down on the floor 3 days ago packing boxes as she is getting ready move.  She was sit on the floor for a while.  She then developed knee pain with swelling.  She twisted her knee when she got up from a sitting position.  She felt a pop.  It has been painful ever since.  She has been trying to ice it and elevate it and rest a little more.  She cannot flex her knee fully.  She can extend it but she has pain at full extension.  She has been taking some Tylenol which has not been very effective.  No other aggravating leaving factors.  She denies any previous knee injury.  She does recall years ago being told she had a meniscal tear.  She cannot member which knee that was.  She never had surgery.    ROS See HPI    Allergies:       Allergies   Allergen Reactions    Amoxicillin Vomiting     Per patient happened only 1 time.        PMSFS Hx:  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    Bronchitis     Essential hypertension, benign     High cholesterol     Hyperlipidemia     Mixed hyperlipidemia     Organic sleep apnea, unspecified     Osteopenia     Renal disorder 04/2019    kidney stones    Sleep apnea     Type II or unspecified type diabetes mellitus without mention of complication, not stated as uncontrolled     oral meds     Past Surgical History:   Procedure Laterality Date    KS CYSTOSCOPY,INSERT URETERAL STENT Bilateral 07/05/2021    Procedure: CYSTOSCOPY, WITH URETERAL STENT INSERTION - POSSIBLE STENT;  Surgeon: Italo Fair M.D.;  Location: SURGERY [facility];  Service: Urology    KS CYSTO/URETERO/PYELOSCOPY, DX Bilateral 07/05/2021    Procedure: URETEROSCOPY;  Surgeon: Italo Fair M.D.;   Location: SURGERY [facility];  Service: Urology    LASERTRIPSY Bilateral 2021    Procedure: LITHOTRIPSY, USING LASER.;  Surgeon: Italo Fair M.D.;  Location: SURGERY [facility];  Service: Urology    CYSTOSCOPY N/A 2019    Procedure: CYSTOSCOPY;  Surgeon: Espinoza Orr M.D.;  Location: SURGERY [facility];  Service: Urology    URETEROSCOPY Right 2019    Procedure: URETEROSCOPY;  Surgeon: Espinoza Orr M.D.;  Location: SURGERY [facility];  Service: Urology    LASERTRIPSY Right 2019    Procedure: LITHOTRIPSY, USING LASER;  Surgeon: Espinoza Orr M.D.;  Location: SURGERY [facility];  Service: Urology    STENT PLACEMENT Right 2019    Procedure: STENT PLACEMENT;  Surgeon: Espinoza Orr M.D.;  Location: SURGERY [facility];  Service: Urology    OTHER  2019    lithotripsy    COLONOSCOPY WITH POLYP  10/2007    tubular adenoma    OTHER ORTHOPEDIC SURGERY N/A 1998    laminectomy    EMBOLECTOMY      remove blood clot from spinal cord    LAMINOTOMY       Family History   Problem Relation Age of Onset    Arthritis Mother     Lung Disease Mother         COPD    Hypertension Mother     Hyperlipidemia Mother     Heart Disease Father          @ 41 from heart attack    Hyperlipidemia Father     Heart Disease Brother         CAD   S/P CABG    Cancer Maternal Grandmother         Pancreatic cancer    Cancer Brother         Prostate cancer     Social History     Tobacco Use    Smoking status: Never    Smokeless tobacco: Never   Substance Use Topics    Alcohol use: Never       Problems:   Patient Active Problem List   Diagnosis    Essential hypertension    Osteopenia    JASPER on CPAP    Vitamin d deficiency    Type 2 diabetes mellitus with hyperglycemia, with long-term current use of insulin (HCC)    Severe obesity (BMI 35.0-39.9) with comorbidity (HCC)    Dyslipidemia    Long-term insulin use (HCC)    Type 2 diabetes mellitus with microalbuminuria, with  "long-term current use of insulin (HCC)    Bowel habit changes    Arthralgia    Diabetic nephropathy associated with diabetes mellitus due to underlying condition (HCC)    Seasonal allergic rhinitis due to pollen    Microalbuminuria due to type 2 diabetes mellitus (HCC)    Rheumatoid factor positive    Plantar fasciitis of left foot    Encounter for screening mammogram for malignant neoplasm of breast       Medications:   Current Outpatient Medications on File Prior to Visit   Medication Sig Dispense Refill    amlodipine-valsartan (EXFORGE)  MG per tablet Take 1 Tablet by mouth every day. 100 Tablet 1    B Complex Vitamins (VITAMIN B COMPLEX PO) Take  by mouth. Once daily      VITAMIN D PO Take  by mouth. Once dialy      TRULICITY 4.5 MG/0.5ML Solution Pen-injector INJECT 4.5 MG UNDER THE SKIN ONCE WEEKLY 6 mL 2    metformin (GLUCOPHAGE) 1000 MG tablet TAKE 1 TABLET BY MOUTH TWICE A DAY WITH MEALS 180 Tablet 1    glimepiride (AMARYL) 4 MG Tab TAKE 1 TABLET BY MOUTH TWICE A  Tablet 2    rosuvastatin (CRESTOR) 20 MG Tab Take 1 Tablet by mouth every day. 100 Tablet 3    Insulin Glargine, 1 Unit Dial, (TOUJEO SOLOSTAR) 300 UNIT/ML Solution Pen-injector Inject 50 Units under the skin every day. 15 mL 3    Continuous Blood Gluc  (FREESTYLE ETELVINA 2 READER) Device 1 Each continuous. 1 Each 0    Continuous Blood Gluc Sensor (FREESTYLE ETELVINA 2 SENSOR) Misc 1 Each every 14 days. 6 Each 3    Multiple Vitamin (MULTIVITAMIN ADULT PO) Take  by mouth.      Telmisartan-amLODIPine 80-5 MG Tab Take  by mouth.      aspirin 81 MG tablet Take 81 mg by mouth every day.       No current facility-administered medications on file prior to visit.        Objective:     /70   Pulse 82   Temp 36.2 °C (97.1 °F) (Temporal)   Resp 16   Ht 1.6 m (5' 3\")   Wt 93.9 kg (207 lb)   LMP 01/01/2009   SpO2 97%   BMI 36.67 kg/m²     Physical Exam  Vitals and nursing note reviewed.   Constitutional:       General: She is not " in acute distress.     Appearance: Normal appearance. She is well-developed. She is not toxic-appearing.   HENT:      Head: Normocephalic.   Cardiovascular:      Rate and Rhythm: Normal rate and regular rhythm.      Pulses: Normal pulses.   Pulmonary:      Effort: Pulmonary effort is normal.   Musculoskeletal:      Right knee: Swelling present. No crepitus. Decreased range of motion. Tenderness (Generalized) present over the MCL and patellar tendon. Abnormal meniscus. Normal alignment and normal patellar mobility.      Right lower leg: Normal.   Skin:     General: Skin is warm and dry.      Capillary Refill: Capillary refill takes less than 2 seconds.   Neurological:      General: No focal deficit present.      Mental Status: She is alert and oriented to person, place, and time.   Psychiatric:         Mood and Affect: Mood normal.         Behavior: Behavior normal. Behavior is cooperative.         Thought Content: Thought content normal.         RADIOLOGY RESULTS   DX-KNEE 3 VIEWS RIGHT    Result Date: 10/19/2023  10/19/2023 9:54 AM HISTORY/REASON FOR EXAM:  Right knee pain TECHNIQUE/EXAM DESCRIPTION AND NUMBER OF VIEWS:  3 views of the RIGHT knee. COMPARISON: None FINDINGS: Bone density is normal.  There is no evidence of fracture or dislocation.  There is moderate joint space narrowing periarticular sclerosis and marginal spurring.  There is small joint effusion.     1.  No evidence of fracture or dislocation. 2.  Moderate osteoarthritis. 3.  Small joint effusion.         Xray: Reviewed and interpreted independently by me. I agree with the radiologist's findings.     Assessment /Associated Orders:      1. Primary osteoarthritis of right knee        2. Acute pain of right knee  DX-KNEE 3 VIEWS Pain/Deformity Following Trauma      3. Strain of right knee, initial encounter            Medical Decision Making:    Ledy is a very pleasant 68 y.o. female who is clinically stable at today's acute urgent care visit.  No  acute distress noted.  VSS. Appropriate for outpatient care at this time.   Acute problem today with uncertain prognosis.   Knee brace prn -cautioned not to drive with the knee brace on.  Referral for follow-up at orthopedic.  Ice packs as needed  Heat pads as needed  OTC analgesic topical muscle/ joint cream prn pain. Dosage and directions per     Discussed Dx, management options (risks,benefits, and alternatives to planned treatment), natural progression and supportive care.  Expressed understanding and the treatment plan was agreed upon.   Questions were encouraged and answered   Return to PCP/ urgent care prn if new or worsening sx or if there is no improvement in condition prn.      Time I spent evaluating Zena Hui in urgent care today was 35  minutes. This time includes preparing for visit, reviewing any pertinent notes or test results, counseling/education, exam, obtaining HPI, interpretation of lab tests, medication management and documentation as indicated above.Time does not include separately billable procedures noted .       Please note that this dictation was created using voice recognition software. I have worked with consultants from the vendor as well as technical experts from [facility] to optimize the interface. I have made every reasonable attempt to correct obvious errors, but I expect that there are errors of grammar and possibly content that I did not discover before finalizing the note.  This note was electronically signed by provider

## 2023-11-27 ENCOUNTER — TELEPHONE (OUTPATIENT)
Dept: ENDOCRINOLOGY | Facility: MEDICAL CENTER | Age: 68
End: 2023-11-27

## 2023-11-27 DIAGNOSIS — E11.65 TYPE 2 DIABETES MELLITUS WITH HYPERGLYCEMIA, WITH LONG-TERM CURRENT USE OF INSULIN (HCC): ICD-10-CM

## 2023-11-27 DIAGNOSIS — Z79.4 TYPE 2 DIABETES MELLITUS WITH HYPERGLYCEMIA, WITH LONG-TERM CURRENT USE OF INSULIN (HCC): ICD-10-CM

## 2023-11-27 RX ORDER — INSULIN HUMAN 4-8-12(60)
4-12 KIT INHALATION
Qty: 180 EACH | Refills: 0 | Status: SHIPPED | OUTPATIENT
Start: 2023-11-27 | End: 2023-11-27 | Stop reason: SDUPTHER

## 2023-11-27 RX ORDER — INSULIN HUMAN 4-8-12(60)
4-12 KIT INHALATION
Qty: 180 EACH | Refills: 3 | Status: SHIPPED | OUTPATIENT
Start: 2023-11-27 | End: 2024-01-15

## 2023-11-27 NOTE — TELEPHONE ENCOUNTER
Patient is calling to request a sample for Luis, she was given a couple samples however, she stored it at room temperature.   Patient would like to know if her samples that was stored at room temp is still valid.       She would also like a prescription sent to [facility].

## 2023-11-29 PROCEDURE — RXMED WILLOW AMBULATORY MEDICATION CHARGE: Performed by: INTERNAL MEDICINE

## 2023-11-30 ENCOUNTER — TELEPHONE (OUTPATIENT)
Dept: ENDOCRINOLOGY | Facility: MEDICAL CENTER | Age: 68
End: 2023-11-30
Payer: MEDICARE

## 2023-11-30 NOTE — TELEPHONE ENCOUNTER
Received voicemail from patient stating that the pharmacy did not receive the sample order. Called [facility]. Staff states that they were able to find the order.  Called patient to advise she can  the prescription. No further action needed. Closing encounter.

## 2023-12-01 ENCOUNTER — PHARMACY VISIT (OUTPATIENT)
Dept: PHARMACY | Facility: MEDICAL CENTER | Age: 68
End: 2023-12-01
Payer: COMMERCIAL

## 2023-12-02 DIAGNOSIS — E11.65 TYPE 2 DIABETES MELLITUS WITH HYPERGLYCEMIA, WITH LONG-TERM CURRENT USE OF INSULIN (HCC): ICD-10-CM

## 2023-12-02 DIAGNOSIS — Z79.4 TYPE 2 DIABETES MELLITUS WITH HYPERGLYCEMIA, WITH LONG-TERM CURRENT USE OF INSULIN (HCC): ICD-10-CM

## 2023-12-06 ENCOUNTER — OFFICE VISIT (OUTPATIENT)
Dept: MEDICAL GROUP | Facility: PHYSICIAN GROUP | Age: 68
End: 2023-12-06
Payer: MEDICARE

## 2023-12-06 VITALS
BODY MASS INDEX: 36.5 KG/M2 | WEIGHT: 206 LBS | HEART RATE: 84 BPM | SYSTOLIC BLOOD PRESSURE: 130 MMHG | RESPIRATION RATE: 18 BRPM | TEMPERATURE: 97.6 F | OXYGEN SATURATION: 97 % | HEIGHT: 63 IN | DIASTOLIC BLOOD PRESSURE: 68 MMHG

## 2023-12-06 DIAGNOSIS — Z79.4 TYPE 2 DIABETES MELLITUS WITH HYPERGLYCEMIA, WITH LONG-TERM CURRENT USE OF INSULIN (HCC): ICD-10-CM

## 2023-12-06 DIAGNOSIS — F40.243 ANXIETY WITH FLYING: ICD-10-CM

## 2023-12-06 DIAGNOSIS — E11.65 TYPE 2 DIABETES MELLITUS WITH HYPERGLYCEMIA, WITH LONG-TERM CURRENT USE OF INSULIN (HCC): ICD-10-CM

## 2023-12-06 DIAGNOSIS — M72.2 PLANTAR FASCIITIS OF LEFT FOOT: ICD-10-CM

## 2023-12-06 PROBLEM — Z12.31 ENCOUNTER FOR SCREENING MAMMOGRAM FOR MALIGNANT NEOPLASM OF BREAST: Status: RESOLVED | Noted: 2023-10-04 | Resolved: 2023-12-06

## 2023-12-06 PROCEDURE — 99214 OFFICE O/P EST MOD 30 MIN: CPT | Performed by: FAMILY MEDICINE

## 2023-12-06 PROCEDURE — 3075F SYST BP GE 130 - 139MM HG: CPT | Performed by: FAMILY MEDICINE

## 2023-12-06 PROCEDURE — 3078F DIAST BP <80 MM HG: CPT | Performed by: FAMILY MEDICINE

## 2023-12-06 RX ORDER — ALPRAZOLAM 0.5 MG/1
TABLET ORAL
Qty: 6 TABLET | Refills: 0 | Status: SHIPPED | OUTPATIENT
Start: 2023-12-06 | End: 2023-12-27

## 2023-12-06 ASSESSMENT — FIBROSIS 4 INDEX: FIB4 SCORE: 0.63

## 2023-12-06 NOTE — PROGRESS NOTES
Subjective:     CC: Here for couple of issues.    HPI:   Zena presents today with the following medical concerns:    Anxiety with flying  This is a chronic problem.  Patient has an upcoming flight and she is asking for refill on her Xanax.  She uses it very sparingly and rarely during the year so I do not feel that a consent form is required.    Plantar fasciitis of left foot  This is a chronic improved problem.  She states physical therapy has helped a great deal.    Type 2 diabetes mellitus with hyperglycemia, with long-term current use of insulin (HCC)  This is a chronic problem.  She is under the care of the endocrinology clinic.  She will recently put on the inhaled insulin and she had some questions about that as she was having hypoglycemia during the night.  She does have an appointment to see them next week.  I went over her medications with their and suggested that she might try taking her Toujeo in the morning instead of the evening and see if that helps.    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    Bronchitis     Essential hypertension, benign     High cholesterol     Hyperlipidemia     Mixed hyperlipidemia     Organic sleep apnea, unspecified     Osteopenia     Renal disorder 04/2019    kidney stones    Sleep apnea     Type II or unspecified type diabetes mellitus without mention of complication, not stated as uncontrolled     oral meds       Social History     Tobacco Use    Smoking status: Never    Smokeless tobacco: Never   Vaping Use    Vaping Use: Never used   Substance Use Topics    Alcohol use: Never    Drug use: Never       Current Outpatient Medications Ordered in Epic   Medication Sig Dispense Refill    ALPRAZolam (XANAX) 0.5 MG Tab Take 1/2 to 1 po TID prn flight anxiety 6 Tablet 0    metformin (GLUCOPHAGE) 1000 MG tablet TAKE 1 TABLET BY MOUTH TWICE A DAY WITH MEALS 180 Tablet 2    Insulin Regular Human  "(AFREZZA) 60x4 &60x8 & 60x12 UNIT Powder Inhale 4-12 Units 3 times a day before meals. 180 Each 3    amlodipine-valsartan (EXFORGE)  MG per tablet Take 1 Tablet by mouth every day. 100 Tablet 1    B Complex Vitamins (VITAMIN B COMPLEX PO) Take  by mouth. Once daily      VITAMIN D PO Take  by mouth. Once dialy      TRULICITY 4.5 MG/0.5ML Solution Pen-injector INJECT 4.5 MG UNDER THE SKIN ONCE WEEKLY 6 mL 2    glimepiride (AMARYL) 4 MG Tab TAKE 1 TABLET BY MOUTH TWICE A  Tablet 2    rosuvastatin (CRESTOR) 20 MG Tab Take 1 Tablet by mouth every day. 100 Tablet 3    Insulin Glargine, 1 Unit Dial, (TOUJEO SOLOSTAR) 300 UNIT/ML Solution Pen-injector Inject 50 Units under the skin every day. 15 mL 3    Continuous Blood Gluc  (FREESTYLE ETELVINA 2 READER) Device 1 Each continuous. 1 Each 0    Continuous Blood Gluc Sensor (FREESTYLE ETELVINA 2 SENSOR) Misc 1 Each every 14 days. 6 Each 3    Multiple Vitamin (MULTIVITAMIN ADULT PO) Take  by mouth.      Telmisartan-amLODIPine 80-5 MG Tab Take  by mouth.      aspirin 81 MG tablet Take 81 mg by mouth every day.       No current [facility]-ordered facility-administered medications on file.       Allergies:  Amoxicillin    Health Maintenance: Completed    ROS:  Gen: no fevers/chills, no changes in weight  Eyes: no changes in vision  ENT: no sore throat, no hearing loss, no bloody nose  Pulm: no sob, no cough  CV: no chest pain, no palpitations  GI: no nausea/vomiting, no diarrhea  : no dysuria  MSk: no myalgias  Skin: no rash  Neuro: no headaches, no numbness/tingling  Heme/Lymph: no easy bruising      Objective:       Exam:  /68 (BP Location: Left arm, Patient Position: Sitting, BP Cuff Size: Adult)   Pulse 84   Temp 36.4 °C (97.6 °F) (Temporal)   Resp 18   Ht 1.6 m (5' 3\")   Wt 93.4 kg (206 lb)   LMP 01/01/2009   SpO2 97%   BMI 36.49 kg/m²  Body mass index is 36.49 kg/m².    Gen: Alert and oriented, No apparent distress.  Ext: No clubbing, cyanosis, " edema.  Gait is normal.  Psych: Patient is alert and cooperative.  No unusual thought Guilherme expressed.  Insight and judgment is good.  Does not appear to be anxious or depressed on today's visit.        Assessment & Plan:     68 y.o. female with the following -     1. Anxiety with flying  This is a chronic problem.  Prescription provided.  Will continue to monitor.  - ALPRAZolam (XANAX) 0.5 MG Tab; Take 1/2 to 1 po TID prn flight anxiety  Dispense: 6 Tablet; Refill: 0    2. Type 2 diabetes mellitus with hyperglycemia, with long-term current use of insulin (HCC)  This is a chronic problem.  We discussed the issue and she will follow-up with endocrinology clinic next week.    3. Plantar fasciitis of left foot  This is a chronic improved problem.  Continue to follow.      Return in about 6 months (around 6/6/2024) for Long.    Please note that this dictation was created using voice recognition software. I have made every reasonable attempt to correct obvious errors, but I expect that there are errors of grammar and possibly content that I did not discover before finalizing the note.

## 2023-12-06 NOTE — ASSESSMENT & PLAN NOTE
This is a chronic problem.  She is under the care of the endocrinology clinic.  She will recently put on the inhaled insulin and she had some questions about that as she was having hypoglycemia during the night.  She does have an appointment to see them next week.  I went over her medications with their and suggested that she might try taking her Toujeo in the morning instead of the evening and see if that helps.

## 2023-12-06 NOTE — ASSESSMENT & PLAN NOTE
This is a chronic problem.  Patient has an upcoming flight and she is asking for refill on her Xanax.  She uses it very sparingly and rarely during the year so I do not feel that a consent form is required.

## 2023-12-28 DIAGNOSIS — Z79.4 TYPE 2 DIABETES MELLITUS WITH HYPERGLYCEMIA, WITH LONG-TERM CURRENT USE OF INSULIN (HCC): ICD-10-CM

## 2023-12-28 DIAGNOSIS — E11.65 TYPE 2 DIABETES MELLITUS WITH HYPERGLYCEMIA, WITH LONG-TERM CURRENT USE OF INSULIN (HCC): ICD-10-CM

## 2024-01-02 ENCOUNTER — NON-PROVIDER VISIT (OUTPATIENT)
Dept: ENDOCRINOLOGY | Facility: MEDICAL CENTER | Age: 69
End: 2024-01-02
Attending: INTERNAL MEDICINE
Payer: MEDICARE

## 2024-01-02 ENCOUNTER — TELEPHONE (OUTPATIENT)
Dept: ENDOCRINOLOGY | Facility: MEDICAL CENTER | Age: 69
End: 2024-01-02

## 2024-01-02 VITALS
HEART RATE: 88 BPM | DIASTOLIC BLOOD PRESSURE: 80 MMHG | OXYGEN SATURATION: 96 % | BODY MASS INDEX: 37.39 KG/M2 | HEIGHT: 63 IN | WEIGHT: 211 LBS | SYSTOLIC BLOOD PRESSURE: 124 MMHG

## 2024-01-02 DIAGNOSIS — Z79.4 TYPE 2 DIABETES MELLITUS WITH HYPERGLYCEMIA, WITH LONG-TERM CURRENT USE OF INSULIN (HCC): ICD-10-CM

## 2024-01-02 DIAGNOSIS — E11.65 TYPE 2 DIABETES MELLITUS WITH HYPERGLYCEMIA, WITH LONG-TERM CURRENT USE OF INSULIN (HCC): ICD-10-CM

## 2024-01-02 LAB
HBA1C MFR BLD: 8.5 % (ref ?–5.8)
POCT INT CON NEG: NEGATIVE
POCT INT CON POS: POSITIVE

## 2024-01-02 PROCEDURE — 83036 HEMOGLOBIN GLYCOSYLATED A1C: CPT

## 2024-01-02 PROCEDURE — 99213 OFFICE O/P EST LOW 20 MIN: CPT | Performed by: INTERNAL MEDICINE

## 2024-01-02 RX ORDER — INSULIN HUMAN 4-8-12(60)
4-12 KIT INHALATION
Qty: 180 EACH | Refills: 3 | Status: SHIPPED | OUTPATIENT
Start: 2024-01-02 | End: 2024-01-15

## 2024-01-02 ASSESSMENT — FIBROSIS 4 INDEX: FIB4 SCORE: 0.63

## 2024-01-02 NOTE — PROGRESS NOTES
"RN-CDE Note    Subjective:   Endocrinology Clinic Progress Note  PCP: Link Polanco III, M.D.    HPI:  Zena Hui is a 68 y.o. old patient who is seen today by the Diabetes Nurse Specialist for review of Type 2 Diabetes.  Recent changes in health: She states she has had a stressful last 6 months.  She is just getting over the flu.  DM:   Last A1c:   Lab Results   Component Value Date/Time    HBA1C 8.5 (A) 01/02/2024 09:53 AM      Previous A1c was 7.9 on 9/19/23  A1C GOAL: < 7    Diabetes Medications:   Toujeo 50 units daily  Trulicity 4.5 mg weekly  Metformin 1000 mg BID  Glimepiride 4 mg BID  Afrezza 4-12 units before meals      Exercise: Decreased since having left foot pain.  Going to physical therapy for knee.  Diet: \"unhealthy\" diet in general with holidays and helping friends move.  Patient's body mass index is 37.38 kg/m². Exercise and nutrition counseling were performed at this visit.    Glucose monitoring frequency: See Christina download    Hypoglycemic episodes: yes - with taking a correction dose of Afrezza  Last Retinal Exam: on file and up-to-date  Daily Foot Exam: Yes   Foot Exam:  Monofilament: done  Monofilament testing with a 10 gram force: sensation intact: intact bilaterally  Visual Inspection: Feet without maceration, ulcers, fissures.  Pedal pulses: intact bilaterally   Lab Results   Component Value Date/Time    MALBCRT 907 (H) 09/13/2023 09:55 AM    MICROALBUR 92.6 09/13/2023 09:55 AM        ACR Albumin/Creatinine Ratio goal <30     HTN:   Blood pressure goal <130/<80 .   Currently Rx ACE/ARB: Yes     Dyslipidemia:    Lab Results   Component Value Date/Time    CHOLSTRLTOT 140 09/13/2023 09:45 AM    LDL 63 09/13/2023 09:45 AM    HDL 57 09/13/2023 09:45 AM    TRIGLYCERIDE 101 09/13/2023 09:45 AM         Currently Rx Statin: Yes     She  reports that she has never smoked. She has never used smokeless tobacco.      Plan:     Discussed and educated on:   - All medications, side effects and " compliance (discussed carefully)  - Annual eye examinations at Ophthalmology  - Home glucose monitoring emphasized  - Weight control and daily exercise    Recommended medication changes: She will increase her Afreeza to 8-12 units before meals.  Encouraged to increase her protein and vegetables and less carbohydrates.  She has a follow up with Dr. Timmons in March.

## 2024-01-02 NOTE — TELEPHONE ENCOUNTER
Prior Authorization request via [employer] for Afrezza 60x4 &60x8 &60x12 UNIT powder (Quantity: 180 each, Days: 30) has been submitted via Cover My Meds: Key ([id number])    Insurance: [employer]    Will follow up in 24-48 business hours.

## 2024-01-04 NOTE — TELEPHONE ENCOUNTER
PA has been Denied for Afrezza 60x4 &60x8 &60x12 UNIT powder for not meeting insurance protocols.      Afrezza is denied because the information provided was not sufficient to support approval for medical  necessity.     (1) The specific medical reasons why you are unable to use two (2) of the following covered drugs:  (a) Humalog, Humalog Cem Kwikpen, Humalog Kwikpen, Insulin Lispro, Lyumjev, or Lyumjev  Kwikpen.  (b) Novolog      Next Steps-Routing denial to Liaisons to check with medical team for further directions.

## 2024-01-06 DIAGNOSIS — Z79.4 TYPE 2 DIABETES MELLITUS WITH HYPERGLYCEMIA, WITH LONG-TERM CURRENT USE OF INSULIN (HCC): ICD-10-CM

## 2024-01-06 DIAGNOSIS — E11.65 TYPE 2 DIABETES MELLITUS WITH HYPERGLYCEMIA, WITH LONG-TERM CURRENT USE OF INSULIN (HCC): ICD-10-CM

## 2024-01-08 DIAGNOSIS — E11.65 TYPE 2 DIABETES MELLITUS WITH HYPERGLYCEMIA, WITH LONG-TERM CURRENT USE OF INSULIN (HCC): ICD-10-CM

## 2024-01-08 DIAGNOSIS — Z79.4 TYPE 2 DIABETES MELLITUS WITH HYPERGLYCEMIA, WITH LONG-TERM CURRENT USE OF INSULIN (HCC): ICD-10-CM

## 2024-01-12 NOTE — TELEPHONE ENCOUNTER
Called patient at [phone] to advise her of the PA denial for the Afrezza. There was no answer and I left a general voice message with my call back number.     Provider was notified of denial, awaiting their decision.    Lakisha Byrd  RX Coordinator/Liaison

## 2024-01-15 DIAGNOSIS — E11.65 TYPE 2 DIABETES MELLITUS WITH HYPERGLYCEMIA, WITH LONG-TERM CURRENT USE OF INSULIN (HCC): ICD-10-CM

## 2024-01-15 DIAGNOSIS — Z79.4 TYPE 2 DIABETES MELLITUS WITH HYPERGLYCEMIA, WITH LONG-TERM CURRENT USE OF INSULIN (HCC): ICD-10-CM

## 2024-01-15 RX ORDER — INSULIN HUMAN 4-8-12(60)
4-12 KIT INHALATION 3 TIMES DAILY
Qty: 180 EACH | Refills: 11 | Status: SHIPPED | OUTPATIENT
Start: 2024-01-15 | End: 2024-01-17 | Stop reason: SDUPTHER

## 2024-01-17 DIAGNOSIS — Z79.4 TYPE 2 DIABETES MELLITUS WITH HYPERGLYCEMIA, WITH LONG-TERM CURRENT USE OF INSULIN (HCC): ICD-10-CM

## 2024-01-17 DIAGNOSIS — E11.65 TYPE 2 DIABETES MELLITUS WITH HYPERGLYCEMIA, WITH LONG-TERM CURRENT USE OF INSULIN (HCC): ICD-10-CM

## 2024-01-17 RX ORDER — INSULIN HUMAN 4-8-12(60)
4-12 KIT INHALATION 3 TIMES DAILY
Qty: 180 EACH | Refills: 11 | Status: SHIPPED
Start: 2024-01-17 | End: 2024-01-18

## 2024-01-17 RX ORDER — INSULIN LISPRO-AABC 100 [IU]/ML
4 INJECTION, SOLUTION SUBCUTANEOUS
Qty: 15 ML | Refills: 11 | Status: SHIPPED | OUTPATIENT
Start: 2024-01-17

## 2024-01-18 NOTE — PROGRESS NOTES
Insurance requires the patient to try injectable analog insulin first before they pay for inhaled insulin so I am ordering Lymujev for patient

## 2024-01-18 NOTE — PROGRESS NOTES
The patient's insurance is denying Afrezza unless the patient tried and failed insulin analogs.  I am going to temporarily discontinue the Afrezza prescription and have her try Lyumjev with each meal so that we can appeal the prior authorization for Afrezza later on if she fails the Lyumjev

## 2024-01-20 ASSESSMENT — PATIENT HEALTH QUESTIONNAIRE - PHQ9
1. LITTLE INTEREST OR PLEASURE IN DOING THINGS: NOT AT ALL
2. FEELING DOWN, DEPRESSED, IRRITABLE, OR HOPELESS: NOT AT ALL

## 2024-01-20 ASSESSMENT — ACTIVITIES OF DAILY LIVING (ADL): BATHING_REQUIRES_ASSISTANCE: 0

## 2024-01-20 ASSESSMENT — ENCOUNTER SYMPTOMS: GENERAL WELL-BEING: GOOD

## 2024-01-22 ASSESSMENT — PATIENT HEALTH QUESTIONNAIRE - PHQ9: CLINICAL INTERPRETATION OF PHQ2 SCORE: 0

## 2024-01-23 ENCOUNTER — HOSPITAL ENCOUNTER (OUTPATIENT)
Facility: MEDICAL CENTER | Age: 69
End: 2024-01-23
Payer: MEDICARE

## 2024-01-23 ENCOUNTER — OFFICE VISIT (OUTPATIENT)
Dept: FAMILY PLANNING/WOMEN'S HEALTH CLINIC | Facility: PHYSICIAN GROUP | Age: 69
End: 2024-01-23
Attending: FAMILY MEDICINE
Payer: COMMERCIAL

## 2024-01-23 VITALS
HEIGHT: 63 IN | WEIGHT: 211 LBS | BODY MASS INDEX: 37.39 KG/M2 | DIASTOLIC BLOOD PRESSURE: 72 MMHG | SYSTOLIC BLOOD PRESSURE: 138 MMHG

## 2024-01-23 DIAGNOSIS — E11.29 MICROALBUMINURIA DUE TO TYPE 2 DIABETES MELLITUS (HCC): ICD-10-CM

## 2024-01-23 DIAGNOSIS — E78.5 DYSLIPIDEMIA ASSOCIATED WITH TYPE 2 DIABETES MELLITUS (HCC): ICD-10-CM

## 2024-01-23 DIAGNOSIS — R80.9 MICROALBUMINURIA DUE TO TYPE 2 DIABETES MELLITUS (HCC): ICD-10-CM

## 2024-01-23 DIAGNOSIS — E11.69 DYSLIPIDEMIA ASSOCIATED WITH TYPE 2 DIABETES MELLITUS (HCC): ICD-10-CM

## 2024-01-23 DIAGNOSIS — E66.01 SEVERE OBESITY (BMI 35.0-39.9) WITH COMORBIDITY (HCC): ICD-10-CM

## 2024-01-23 DIAGNOSIS — Z79.4 LONG-TERM INSULIN USE (HCC): ICD-10-CM

## 2024-01-23 DIAGNOSIS — Z79.4 TYPE 2 DIABETES MELLITUS WITH HYPERGLYCEMIA, WITH LONG-TERM CURRENT USE OF INSULIN (HCC): ICD-10-CM

## 2024-01-23 DIAGNOSIS — I10 ESSENTIAL HYPERTENSION: ICD-10-CM

## 2024-01-23 DIAGNOSIS — E11.65 TYPE 2 DIABETES MELLITUS WITH HYPERGLYCEMIA, WITH LONG-TERM CURRENT USE OF INSULIN (HCC): ICD-10-CM

## 2024-01-23 PROCEDURE — 3075F SYST BP GE 130 - 139MM HG: CPT

## 2024-01-23 PROCEDURE — 1125F AMNT PAIN NOTED PAIN PRSNT: CPT

## 2024-01-23 PROCEDURE — 82570 ASSAY OF URINE CREATININE: CPT

## 2024-01-23 PROCEDURE — 3078F DIAST BP <80 MM HG: CPT

## 2024-01-23 PROCEDURE — 82043 UR ALBUMIN QUANTITATIVE: CPT

## 2024-01-23 PROCEDURE — G0439 PPPS, SUBSEQ VISIT: HCPCS

## 2024-01-23 SDOH — ECONOMIC STABILITY: INCOME INSECURITY: IN THE PAST 12 MONTHS, HAS THE ELECTRIC, GAS, OIL, OR WATER COMPANY THREATENED TO SHUT OFF SERVICE IN YOUR HOME?: NO

## 2024-01-23 SDOH — ECONOMIC STABILITY: INCOME INSECURITY: IN THE LAST 12 MONTHS, WAS THERE A TIME WHEN YOU WERE NOT ABLE TO PAY THE MORTGAGE OR RENT ON TIME?: NO

## 2024-01-23 SDOH — ECONOMIC STABILITY: FOOD INSECURITY: WITHIN THE PAST 12 MONTHS, YOU WORRIED THAT YOUR FOOD WOULD RUN OUT BEFORE YOU GOT MONEY TO BUY MORE.: NEVER TRUE

## 2024-01-23 SDOH — ECONOMIC STABILITY: FOOD INSECURITY: WITHIN THE PAST 12 MONTHS, THE FOOD YOU BOUGHT JUST DIDN'T LAST AND YOU DIDN'T HAVE MONEY TO GET MORE.: NEVER TRUE

## 2024-01-23 SDOH — ECONOMIC STABILITY: HOUSING INSECURITY: IN THE LAST 12 MONTHS, HOW MANY PLACES HAVE YOU LIVED?: 1

## 2024-01-23 SDOH — ECONOMIC STABILITY: INCOME INSECURITY: HOW HARD IS IT FOR YOU TO PAY FOR THE VERY BASICS LIKE FOOD, HOUSING, MEDICAL CARE, AND HEATING?: NOT HARD AT ALL

## 2024-01-23 ASSESSMENT — PAIN SCALES - GENERAL: PAINLEVEL: 2=MINIMAL-SLIGHT

## 2024-01-23 ASSESSMENT — FIBROSIS 4 INDEX: FIB4 SCORE: 0.63

## 2024-01-23 NOTE — ASSESSMENT & PLAN NOTE
Chronic, stable. The patient reports that she has a hard time dieting. She started exercising with her  2 weeks ago. Discussed adding walking to her daily routine and reducing calorie intake.  Follow-up at least annually.

## 2024-01-23 NOTE — ASSESSMENT & PLAN NOTE
Chronic, stable. Last microalbumin creatinine ratio was 907 on 9/13/2023. Last A1c on 1/4/2024 was 8.5. Continue with current defined treatment plan: Insulin Glargine, 1 Unit Dial, (TOUJEO SOLOSTAR) 300 UNIT/ML Solution Pen-injector and Insulin Lispro-aabc, 1 U Dial, (LYUMJEV KWIKPEN) 100 UNIT/ML Solution Pen-injector and TRULICITY 4.5 MG/0.5ML Solution Pen-injector . Follow-up at least annually.

## 2024-01-23 NOTE — ASSESSMENT & PLAN NOTE
Chronic, stable. Last A1c on 1/4/2024 was 8.5. Continue with current defined treatment plan: glimepiride (AMARYL) 4 MG Tab, Insulin Glargine, 1 Unit Dial, (TOUJEO SOLOSTAR) 300 UNIT/ML Solution Pen-injector, Insulin Lispro-aabc, 1 U Dial, (LYUMTONY HERNANDEZIKPEN) 100 UNIT/ML Solution Pen-injector, metformin (GLUCOPHAGE) 1000 MG tablet  and TRULICITY 4.5 MG/0.5ML Solution Pen-injector . Follow-up at least annually.

## 2024-01-23 NOTE — ASSESSMENT & PLAN NOTE
Chronic, stable.  The patient denies any side effects from her medication.  Continue with current defined treatment plan: rosuvastatin (CRESTOR) 20 MG Tab . Follow-up at least annually.

## 2024-01-23 NOTE — PROGRESS NOTES
Comprehensive Health Assessment Program     Zena Hui is a 68 y.o. here for her comprehensive health assessment.    Patient Active Problem List    Diagnosis Date Noted    Dyslipidemia associated with type 2 diabetes mellitus ([facility]) 01/23/2024    Anxiety with flying 12/06/2023    Plantar fasciitis of left foot 10/04/2023    Microalbuminuria due to type 2 diabetes mellitus ([facility]) 08/11/2023    Rheumatoid factor positive 08/11/2023    Seasonal allergic rhinitis due to pollen 06/19/2023    Diabetic nephropathy associated with diabetes mellitus due to underlying condition ([facility]) 05/18/2023    Bowel habit changes 05/16/2023    Arthralgia 05/16/2023    BMI 37.0-37.9, adult 08/01/2022    Long-term insulin use ([facility]) 05/09/2022    Severe obesity (BMI 35.0-39.9) with comorbidity ([facility]) 11/03/2021    Type 2 diabetes mellitus with hyperglycemia, with long-term current use of insulin ([facility]) 01/12/2012    Vitamin d deficiency 03/11/2010    Essential hypertension 06/17/2009    Osteopenia 06/17/2009    JASPER on CPAP 06/17/2009       Current Outpatient Medications   Medication Sig Dispense Refill    Insulin Lispro-aabc, 1 U Dial, (LYUMJEV KWIKPEN) 100 UNIT/ML Solution Pen-injector Inject 4 Units under the skin 3 times a day before meals. 5 pens 15 mL 11    Continuous Blood Gluc Sensor (FREESTYLE ETELVINA 2 SENSOR) Misc USE 1 EACH EVERY 14 DAYS. 6 Each 3    metformin (GLUCOPHAGE) 1000 MG tablet TAKE 1 TABLET BY MOUTH TWICE A DAY WITH MEALS 180 Tablet 2    amlodipine-valsartan (EXFORGE)  MG per tablet Take 1 Tablet by mouth every day. 100 Tablet 1    B Complex Vitamins (VITAMIN B COMPLEX PO) Take  by mouth. Once daily      VITAMIN D PO Take  by mouth. Once dialy      TRULICITY 4.5 MG/0.5ML Solution Pen-injector INJECT 4.5 MG UNDER THE SKIN ONCE WEEKLY 6 mL 2    glimepiride (AMARYL) 4 MG Tab TAKE 1 TABLET BY MOUTH TWICE A  Tablet 2    rosuvastatin (CRESTOR) 20 MG Tab Take 1 Tablet by mouth every day. 100 Tablet 3     Insulin Glargine, 1 Unit Dial, (TOUJEO SOLOSTAR) 300 UNIT/ML Solution Pen-injector Inject 50 Units under the skin every day. 15 mL 3    Continuous Blood Gluc  (FREESTYLE ETELVINA 2 READER) Device 1 Each continuous. 1 Each 0    Multiple Vitamin (MULTIVITAMIN ADULT PO) Take  by mouth.      aspirin 81 MG tablet Take 81 mg by mouth every day.       No current facility-administered medications for this visit.          Current supplements as per medication list.     Allergies:   Amoxicillin  Social History     Tobacco Use    Smoking status: Never    Smokeless tobacco: Never   Vaping Use    Vaping Use: Never used   Substance Use Topics    Alcohol use: Never    Drug use: Never     Family History   Problem Relation Age of Onset    Arthritis Mother     Lung Disease Mother         COPD    Hypertension Mother     Hyperlipidemia Mother     Heart Disease Father          @ 41 from heart attack    Hyperlipidemia Father     Heart Disease Brother         CAD   S/P CABG    Cancer Maternal Grandmother         Pancreatic cancer    Cancer Brother         Prostate cancer     Zena  has a past medical history of Arthritis (), Blood clotting disorder (HCC), Bronchitis, Essential hypertension, benign, High cholesterol, Hyperlipidemia, Mixed hyperlipidemia, Organic sleep apnea, unspecified, Osteopenia, Renal disorder (2019), Sleep apnea, Type 2 diabetes mellitus with microalbuminuria, with long-term current use of insulin (HCC) (2023), and Type II or unspecified type diabetes mellitus without mention of complication, not stated as uncontrolled.    She has no past medical history of Breast cancer (HCC).   Past Surgical History:   Procedure Laterality Date    OK CYSTOSCOPY,INSERT URETERAL STENT Bilateral 2021    Procedure: CYSTOSCOPY, WITH URETERAL STENT INSERTION - POSSIBLE STENT;  Surgeon: Italo Fair M.D.;  Location: SURGERY [facility];  Service: Urology    OK CYSTO/URETERO/PYELOSCOPY, DX Bilateral  07/05/2021    Procedure: URETEROSCOPY;  Surgeon: Italo Fair M.D.;  Location: SURGERY [facility];  Service: Urology    LASERTRIPSY Bilateral 07/05/2021    Procedure: LITHOTRIPSY, USING LASER.;  Surgeon: Italo Fair M.D.;  Location: SURGERY [facility];  Service: Urology    CYSTOSCOPY N/A 07/05/2019    Procedure: CYSTOSCOPY;  Surgeon: Espinoza Orr M.D.;  Location: SURGERY [facility];  Service: Urology    URETEROSCOPY Right 07/05/2019    Procedure: URETEROSCOPY;  Surgeon: Espinoza Orr M.D.;  [place] [facility]  Service: Urology    LASERTRIPSY Right 07/05/2019    Procedure: LITHOTRIPSY, USING LASER;  Surgeon: Espinoza Orr M.D.;  [place] [facility]  Service: Urology    STENT PLACEMENT Right 07/05/2019    Procedure: STENT PLACEMENT;  Surgeon: Espinoza Orr M.D.;  [place] [facility]  Service: Urology    OTHER  07/2019    lithotripsy    COLONOSCOPY WITH POLYP  10/2007    tubular adenoma    OTHER ORTHOPEDIC SURGERY N/A 04/1998    laminectomy    EMBOLECTOMY  1998    remove blood clot from spinal cord    LAMINOTOMY         Screening:  In the last six months have you experienced any leakage of urine? No    Depression Screening  Little interest or pleasure in doing things?  0 - not at all  Feeling down, depressed , or hopeless? 0 - not at all  Patient Health Questionnaire Score: 0     If depressive symptoms identified deferred to follow up visit unless specifically addressed in assessment and plan.    Interpretation of PHQ-9 Total Score   Score Severity   1-4 No Depression   5-9 Mild Depression   10-14 Moderate Depression   15-19 Moderately Severe Depression   20-27 Severe Depression    Screening for Cognitive Impairment  Do you or any of your friends or family members have any concern about your memory? No  Three Minute Recall (Banana, Sunrise, Chair) 3/3    Navin clock face with all 12 numbers and set the hands to show 20 past 8.  Yes  5/5  Cognitive concerns identified deferred for follow up unless specifically addressed in assessment and plan.    Fall Risk Assessment  Has the patient had two or more falls in the last year or any fall with injury in the last year?  No    Safety Assessment  Do you always wear your seatbelt?  Yes  Any changes to home needed to function safely? No  Difficulty hearing.  No  Patient counseled about all safety risks that were identified.    Functional Assessment ADLs  Are there any barriers preventing you from cooking for yourself or meeting nutritional needs?  No.    Are there any barriers preventing you from driving safely or obtaining transportation?  No.    Are there any barriers preventing you from using a telephone or calling for help?  No    Are there any barriers preventing you from shopping?  No.    Are there any barriers preventing you from taking care of your own finances?  No    Are there any barriers preventing you from managing your medications?  No    Are there any barriers preventing you from showering, bathing or dressing yourself? No    Are there any barriers preventing you from doing housework or laundry? No  Are there any barriers preventing you from using the toilet?No  Are you currently engaging in any exercise or physical activity?  Yes.      Self-Assessment of Health  What is your perception of your health? Good  Do you sleep more than six hours a night? No  In the past 7 days, how much did pain keep you from doing your normal work? None  Do you spend quality time with family or friends (virtually or in person)? Yes  Do you usually eat a heart healthy diet that constists of a variety of fruits, vegetables, whole grains and fiber? Yes  Do you eat foods high in fat and/or Fast Food more than three times per week? No    Advance Care Planning  Do you have an Advance Directive, Living Will, Durable Power of , or POLST? Yes    Living Will     is not on file - instructed patient to bring in a  copy to scan into their chart      Health Maintenance Summary            Overdue - Hepatitis C Screening (Once) Overdue - never done      No completion history exists for this topic.              A1c Screening (Every 6 Months) Next due on 7/2/2024 01/02/2024  POCT  A1C    09/19/2023  POCT  A1C    05/18/2023  POCT Hemoglobin A1C    01/10/2023  HEMOGLOBIN A1C    11/18/2022  HEMOGLOBIN A1C    Only the first 5 history entries have been loaded, but more history exists.              Fasting Lipid Profile (Yearly) Next due on 9/13/2024 09/13/2023  Lipid Profile    11/18/2022  Lipid Profile    05/05/2022  Lipid Profile    01/27/2022  Lipid Profile    06/28/2021  Lipid Profile    Only the first 5 history entries have been loaded, but more history exists.              Ordered - Diabetes: Urine Protein Screening (Yearly) Ordered on 1/23/2024 09/13/2023  MICROALBUMIN CREAT RATIO URINE    01/06/2023  MICROALBUMIN CREAT RATIO URINE    01/27/2022  MICROALBUMIN CREAT RATIO URINE    10/05/2020  MICROALBUMIN CREAT RATIO URINE    06/10/2013  MICROALBUMIN CREAT RATIO URINE    Only the first 5 history entries have been loaded, but more history exists.              SERUM CREATININE (Yearly) Next due on 9/13/2024 09/13/2023  Comp Metabolic Panel    01/06/2023  Comp Metabolic Panel    11/18/2022  Comp Metabolic Panel    08/10/2022  Comp Metabolic Panel    05/05/2022  Comp Metabolic Panel    Only the first 5 history entries have been loaded, but more history exists.              Diabetes: Retinopathy Screening (Yearly) Next due on 11/10/2024      11/10/2023  AMB EXTERNAL RETINAL SCREENING RESULTS    10/14/2022  AMB EXTERNAL RETINAL SCREENING RESULTS    10/27/2021  Done - Per pt states Dr. Tiera Lantigua negative except finding on R eye for caratarct    06/27/2016  AMB REFERRAL FOR RETINAL SCREENING EXAM    01/08/2014  AMB REFERRAL FOR RETINAL SCREENING EXAM              Diabetes: Monofilament / LE Exam (Yearly) Next  due on 1/2/2025 01/02/2024  Diabetic Monofilament LE Exam    01/02/2024  SmartData: WORKFLOW - DIABETES - DIABETIC FOOT EXAM PERFORMED    09/19/2023  Diabetic Monofilament LE Exam    09/19/2023  SmartData: WORKFLOW - DIABETES - DIABETIC FOOT EXAM PERFORMED    01/10/2023  Diabetic Monofilament LE Exam    Only the first 5 history entries have been loaded, but more history exists.              Mammogram (Every 2 Years) Next due on 10/19/2025      10/19/2023  MA-SCREENING MAMMO BILAT W/TOMOSYNTHESIS W/CAD    11/04/2021  MA-SCREENING MAMMO BILAT W/TOMOSYNTHESIS W/CAD    06/29/2020  MA-SCREENING MAMMO BILAT W/CAD    04/08/2013  Done    04/08/2013  MA-SCREENING MAMMOGRAM W/ CAD    Only the first 5 history entries have been loaded, but more history exists.              Bone Density Scan (Every 5 Years) Tentatively due on 2/3/2027      02/03/2022  DS-BONE DENSITY STUDY (DEXA)    08/16/2007  DS-BONE DENSITY STUDY (DEXA)              IMM DTaP/Tdap/Td Vaccine (4 - Td or Tdap) Next due on 10/22/2029      10/22/2019  Imm Admin: Tdap Vaccine    02/25/2010  Imm Admin: Tdap Vaccine    07/23/2007  Imm Admin: Tdap Vaccine              Colorectal Cancer Screening (Colonoscopy - Every 10 Years) Next due on 5/1/2031 05/01/2021  Colonoscopy (Done)    10/23/2007  Colonoscopy (Done - Tubular adenoma)              Zoster (Shingles) Vaccines (Series Information) Completed      03/04/2020  Imm Admin: Zoster Vaccine Recombinant (RZV) (SHINGRIX)    11/29/2019  Imm Admin: Zoster Vaccine Recombinant (RZV) (SHINGRIX)    10/22/2019  Imm Admin: Zoster Vaccine Recombinant (RZV) (SHINGRIX)              Pneumococcal Vaccine: 65+ Years (Series Information) Completed      03/10/2023  Imm Admin: Pneumococcal Conjugate Vaccine (PCV20)    09/12/2013  Imm Admin: Pneumococcal polysaccharide vaccine (PPSV-23)              Influenza Vaccine (Series Information) Completed      09/19/2023  Imm Admin: Influenza Vaccine Adult HD    09/12/2022  Imm Admin:  Influenza Vaccine Adult HD    09/30/2021  Imm Admin: Influenza Vaccine Adult HD    09/08/2020  Imm Admin: Influenza Vaccine Quad Inj (Pf)    08/27/2019  Imm Admin: Influenza Vaccine Quad Inj (Pf)    Only the first 5 history entries have been loaded, but more history exists.              COVID-19 Vaccine (Series Information) Completed      10/04/2023  Imm Admin: Comirnaty (Covid-19 Vaccine, Mrna, 6200-6649 Formula)    10/10/2022  Imm Admin: PFIZER BIVALENT SARS-COV-2 VACCINE (12+)    04/05/2022  Imm Admin: PFIZER NUÑEZ CAP SARS-COV-2 VACCINATION (12+)    09/15/2021  Imm Admin: PFIZER PURPLE CAP SARS-COV-2 VACCINATION (12+)    01/30/2021  Imm Admin: PFIZER PURPLE CAP SARS-COV-2 VACCINATION (12+)    Only the first 5 history entries have been loaded, but more history exists.              Hepatitis A Vaccine (Hep A) (Series Information) Aged Out      No completion history exists for this topic.              HPV Vaccines (Series Information) Aged Out      No completion history exists for this topic.              Polio Vaccine (Inactivated Polio) (Series Information) Aged Out      No completion history exists for this topic.              Meningococcal Immunization (Series Information) Aged Out      No completion history exists for this topic.              Discontinued - Cervical Cancer Screening  Discontinued        Frequency changed to Never automatically (Topic No Longer Applies)    04/08/2013  PAP IG, RFX HPV ALL PTH    01/12/2012  PAP IG, RFX HPV ALL PTH    03/11/2010  PAP IG, RFX HPV ALL PTH    09/17/2009  PAP IG, RFX HPV ALL PTH              Discontinued - Annual Wellness Visit  Discontinued        Frequency changed to Never automatically (Topic No Longer Applies)    01/23/2024  Level of Service: MO ANNUAL WELLNESS VISIT-INCLUDES PPPS SUBSEQUE*    08/11/2023  Level of Service: MO ANNUAL WELLNESS VISIT-INCLUDES PPPS SUBSEQUE*    08/01/2022  Level of Service: MO ANNUAL WELLNESS VISIT-INCLUDES PPPS SUBSEQUE*     "10/27/2021  Level of Service: HI ANNUAL WELLNESS VISIT-INCLUDES PPPS SUBSEQUE*              Discontinued - Hepatitis B Vaccine (Hep B)  Discontinued      No completion history exists for this topic.                    Patient Care Team:  Link Polanco III, M.D. as PCP - General (Family Medicine)  Cristina as Respiratory Therapist (DME Supplier)  Trent Timmons M.D. (Endocrinology)  Tiera Roberson O.D. (Optometry)      Financial Resource Strain: Low Risk  (1/23/2024)    Overall Financial Resource Strain (CARDIA)     Difficulty of Paying Living Expenses: Not hard at all      Transportation Needs: No Transportation Needs (1/23/2024)    PRAPARE - Transportation     Lack of Transportation (Medical): No     Lack of Transportation (Non-Medical): No      Food Insecurity: No Food Insecurity (1/23/2024)    Hunger Vital Sign     Worried About Running Out of Food in the Last Year: Never true     Ran Out of Food in the Last Year: Never true        Encounter Vitals  Blood Pressure : 138/72  Weight: 95.7 kg (211 lb)  Height: 160 cm (5' 3\")  BMI (Calculated): 37.38  Pain Score: 2=Minimal-Slight     Alert, oriented in no acute distress.  Eye contact is good, speech goal directed, affect calm.    Assessment and Plan. The following treatment and monitoring plan is recommended:  Essential hypertension  Chronic, stable. The patient's blood pressure is well controlled with current medication. Continue with current defined treatment plan: amlodipine-valsartan (EXFORGE)  MG per tablet . Follow-up at least annually.    Long-term insulin use (HCC)  Chronic, stable. Last A1c on 1/4/2024 was 8.5. Continue with current defined treatment plan: Insulin Glargine, 1 Unit Dial, (TOUJEO SOLOSTAR) 300 UNIT/ML Solution Pen-injector and Insulin Lispro-aabc, 1 U Dial, (LYUMJEV KWIKPEN) 100 UNIT/ML Solution Pen-injector and TRULICITY 4.5 MG/0.5ML Solution Pen-injector . Follow-up at least annually.      Severe obesity (BMI 35.0-39.9) with " comorbidity (HCC)  Chronic, stable. The patient reports that she has a hard time dieting. She started exercising with her  2 weeks ago. Discussed adding walking to her daily routine and reducing calorie intake.  Follow-up at least annually.      Dyslipidemia associated with type 2 diabetes mellitus (HCC)  Chronic, stable.  The patient denies any side effects from her medication.  Continue with current defined treatment plan: rosuvastatin (CRESTOR) 20 MG Tab . Follow-up at least annually.     Type 2 diabetes mellitus with hyperglycemia, with long-term current use of insulin (HCC)  Chronic, stable. Last A1c on 1/4/2024 was 8.5. Continue with current defined treatment plan: glimepiride (AMARYL) 4 MG Tab, Insulin Glargine, 1 Unit Dial, (TOUJEO SOLOSTAR) 300 UNIT/ML Solution Pen-injector, Insulin Lispro-aabc, 1 U Dial, (LYUMJEV KWIKPEN) 100 UNIT/ML Solution Pen-injector, metformin (GLUCOPHAGE) 1000 MG tablet  and TRULICITY 4.5 MG/0.5ML Solution Pen-injector . Follow-up at least annually.      Type 2 diabetes mellitus with microalbuminuria, with long-term current use of insulin (formerly [facility])  Chronic, stable. Last microalbumin creatinine ratio was 907 on 9/13/2023. Last A1c on 1/4/2024 was 8.5. Continue with current defined treatment plan: Insulin Glargine, 1 Unit Dial, (TOUJEO SOLOSTAR) 300 UNIT/ML Solution Pen-injector and Insulin Lispro-aabc, 1 U Dial, (LYUMJEV KWIKPEN) 100 UNIT/ML Solution Pen-injector and TRULICITY 4.5 MG/0.5ML Solution Pen-injector . Follow-up at least annually.     BMI 37.0-37.9, adult  Chronic, stable. The patient reports that she has a hard time dieting. She started exercising with her  2 weeks ago. Discussed adding walking to her daily routine and reducing calorie intake.  Follow-up at least annually.       Services suggested: No services needed at this time  Health Care Screening: Age-appropriate preventive services recommended by USPTF and ACIP covered by Medicare were discussed today.  Services ordered if indicated and agreed upon by the patient.  Referrals offered: Community-based lifestyle interventions to reduce health risks and promote self-management and wellness, fall prevention, nutrition, physical activity, tobacco-use cessation, weight loss, and mental health services as per orders if indicated.    Discussion today about general wellness and lifestyle habits:    Prevent falls and reduce trip hazards; Cautioned about securing or removing rugs.  Have a working fire alarm and carbon monoxide detector.  Engage in regular physical activity and social activities.    Follow-up: Return for appointment with Primary Care Provider as needed.

## 2024-01-23 NOTE — ASSESSMENT & PLAN NOTE
Chronic, stable. The patient's blood pressure is well controlled with current medication. Continue with current defined treatment plan: amlodipine-valsartan (EXFORGE)  MG per tablet . Follow-up at least annually.

## 2024-01-23 NOTE — ASSESSMENT & PLAN NOTE
Chronic, stable. Last A1c on 1/4/2024 was 8.5. Continue with current defined treatment plan: Insulin Glargine, 1 Unit Dial, (TOUJEO SOLOSTAR) 300 UNIT/ML Solution Pen-injector and Insulin Lispro-aabc, 1 U Dial, (CHRISUMTONY HERNANDEZIKPEN) 100 UNIT/ML Solution Pen-injector and TRULICITY 4.5 MG/0.5ML Solution Pen-injector . Follow-up at least annually.

## 2024-01-24 LAB
CREAT UR-MCNC: 137.03 MG/DL
MICROALBUMIN UR-MCNC: 113 MG/DL
MICROALBUMIN/CREAT UR: 825 MG/G (ref 0–30)

## 2024-01-27 DIAGNOSIS — E11.65 TYPE 2 DIABETES MELLITUS WITH HYPERGLYCEMIA, WITH LONG-TERM CURRENT USE OF INSULIN (HCC): ICD-10-CM

## 2024-01-27 DIAGNOSIS — Z79.4 TYPE 2 DIABETES MELLITUS WITH HYPERGLYCEMIA, WITH LONG-TERM CURRENT USE OF INSULIN (HCC): ICD-10-CM

## 2024-01-28 RX ORDER — GLIMEPIRIDE 4 MG/1
TABLET ORAL
Qty: 60 TABLET | Refills: 9 | Status: SHIPPED | OUTPATIENT
Start: 2024-01-28 | End: 2024-02-26

## 2024-02-06 ENCOUNTER — OFFICE VISIT (OUTPATIENT)
Dept: MEDICAL GROUP | Facility: PHYSICIAN GROUP | Age: 69
End: 2024-02-06
Payer: COMMERCIAL

## 2024-02-06 ENCOUNTER — HOSPITAL ENCOUNTER (OUTPATIENT)
Dept: LAB | Facility: MEDICAL CENTER | Age: 69
End: 2024-02-06
Attending: FAMILY MEDICINE
Payer: COMMERCIAL

## 2024-02-06 VITALS
HEIGHT: 63 IN | WEIGHT: 213 LBS | SYSTOLIC BLOOD PRESSURE: 128 MMHG | TEMPERATURE: 97.8 F | BODY MASS INDEX: 37.74 KG/M2 | RESPIRATION RATE: 18 BRPM | DIASTOLIC BLOOD PRESSURE: 74 MMHG | OXYGEN SATURATION: 97 % | HEART RATE: 82 BPM

## 2024-02-06 DIAGNOSIS — R60.0 MILD PERIPHERAL EDEMA: ICD-10-CM

## 2024-02-06 DIAGNOSIS — R01.1 MURMUR: ICD-10-CM

## 2024-02-06 DIAGNOSIS — K59.09 OTHER CONSTIPATION: ICD-10-CM

## 2024-02-06 DIAGNOSIS — Z79.4 TYPE 2 DIABETES MELLITUS WITH HYPERGLYCEMIA, WITH LONG-TERM CURRENT USE OF INSULIN (HCC): ICD-10-CM

## 2024-02-06 DIAGNOSIS — E11.65 TYPE 2 DIABETES MELLITUS WITH HYPERGLYCEMIA, WITH LONG-TERM CURRENT USE OF INSULIN (HCC): ICD-10-CM

## 2024-02-06 DIAGNOSIS — M05.741 RHEUMATOID ARTHRITIS WITH RHEUMATOID FACTOR OF RIGHT HAND WITHOUT ORGAN OR SYSTEMS INVOLVEMENT (HCC): ICD-10-CM

## 2024-02-06 PROBLEM — R19.4 BOWEL HABIT CHANGES: Status: RESOLVED | Noted: 2023-05-16 | Resolved: 2024-02-06

## 2024-02-06 LAB — TSH SERPL DL<=0.005 MIU/L-ACNC: 1.95 UIU/ML (ref 0.38–5.33)

## 2024-02-06 PROCEDURE — 3074F SYST BP LT 130 MM HG: CPT | Performed by: FAMILY MEDICINE

## 2024-02-06 PROCEDURE — 3078F DIAST BP <80 MM HG: CPT | Performed by: FAMILY MEDICINE

## 2024-02-06 PROCEDURE — 99214 OFFICE O/P EST MOD 30 MIN: CPT | Performed by: FAMILY MEDICINE

## 2024-02-06 PROCEDURE — 36415 COLL VENOUS BLD VENIPUNCTURE: CPT

## 2024-02-06 PROCEDURE — 84443 ASSAY THYROID STIM HORMONE: CPT

## 2024-02-06 PROCEDURE — 80053 COMPREHEN METABOLIC PANEL: CPT

## 2024-02-06 ASSESSMENT — FIBROSIS 4 INDEX: FIB4 SCORE: 0.63

## 2024-02-06 NOTE — PROGRESS NOTES
Subjective:     CC: Here for several issues.    HPI:   Zena presents today with the following medical concerns:    Mild peripheral edema  This is a new problem.  Patient states for about the last 2 months she has had troubles with swelling in her legs.  It is a present when she gets up in the morning and persist throughout the day.  Today actually is a little better than it has been.  She has had a cough that has been nagging for the last 6 months.  She is also having fatigue.  She is on a CPAP and last saw sleep clinic last May.  She does not have a lot of salt in her diet.    Murmur  This is a new finding.  Today she does have a 2/6 systolic murmur.  This has not been noted before.  Echocardiogram was done 2 and half years ago in preparation for preop and no significant valvular disease is seen at that time.    Rheumatoid arthritis with rheumatoid factor of right hand without organ or systems involvement (HCC)  This is a chronic problem.  She has been referred to rheumatology.    Type 2 diabetes mellitus with hyperglycemia, with long-term current use of insulin (HCC)  This is a chronic problem.  Her hemoglobin's A1c is higher at 8.5%.  She is working with Dr. Beyer to find a treatment regimen that works for her.    Other constipation  This is a chronic problem.  Patient states she has had troubles with chronic constipation all of her life.  She is now exercising a little more and taking lots of water.  She does have fiber in her diet.  I suggested that she add in fiber supplements and see if that helps.  She does use laxatives as needed.    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    Bronchitis     Essential hypertension, benign     High cholesterol     Hyperlipidemia     Mixed hyperlipidemia     Organic sleep apnea, unspecified     Osteopenia     Renal disorder 04/2019    kidney stones    Sleep apnea     Type 2 diabetes mellitus  with microalbuminuria, with long-term current use of insulin ([facility]) 02/28/2023    Type II or unspecified type diabetes mellitus without mention of complication, not stated as uncontrolled     oral meds       Social History     Tobacco Use    Smoking status: Never    Smokeless tobacco: Never   Vaping Use    Vaping Use: Never used   Substance Use Topics    Alcohol use: Never    Drug use: Never       Current Outpatient Medications Ordered in Epic   Medication Sig Dispense Refill    glimepiride (AMARYL) 4 MG Tab TAKE 1 TABLET BY MOUTH TWICE A DAY 60 Tablet 9    Insulin Lispro-aabc, 1 U Dial, (LYUMJEV KWIKPEN) 100 UNIT/ML Solution Pen-injector Inject 4 Units under the skin 3 times a day before meals. 5 pens 15 mL 11    Continuous Blood Gluc Sensor (FREESTYLE ETELVINA 2 SENSOR) Misc USE 1 EACH EVERY 14 DAYS. 6 Each 3    metformin (GLUCOPHAGE) 1000 MG tablet TAKE 1 TABLET BY MOUTH TWICE A DAY WITH MEALS 180 Tablet 2    amlodipine-valsartan (EXFORGE)  MG per tablet Take 1 Tablet by mouth every day. 100 Tablet 1    B Complex Vitamins (VITAMIN B COMPLEX PO) Take  by mouth. Once daily      VITAMIN D PO Take  by mouth. Once dialy      TRULICITY 4.5 MG/0.5ML Solution Pen-injector INJECT 4.5 MG UNDER THE SKIN ONCE WEEKLY 6 mL 2    rosuvastatin (CRESTOR) 20 MG Tab Take 1 Tablet by mouth every day. 100 Tablet 3    Insulin Glargine, 1 Unit Dial, (TOUJEO SOLOSTAR) 300 UNIT/ML Solution Pen-injector Inject 50 Units under the skin every day. 15 mL 3    Continuous Blood Gluc  (FREESTYLE ETELVINA 2 READER) Device 1 Each continuous. 1 Each 0    Multiple Vitamin (MULTIVITAMIN ADULT PO) Take  by mouth.      aspirin 81 MG tablet Take 81 mg by mouth every day.       No current [facility]-ordered facility-administered medications on file.       Allergies:  Amoxicillin    Health Maintenance: Completed    ROS:  Gen: no fevers/chills, mild gain in weight  Eyes: no changes in vision  ENT: no sore throat, no hearing loss, no bloody nose  Pulm: no  "sob, occasional cough  CV: no chest pain, no palpitations  GI: no nausea/vomiting, no diarrhea  : no dysuria  MSk: no myalgias  Skin: no rash  Neuro: no headaches, no numbness/tingling  Heme/Lymph: no easy bruising      Objective:       Exam:  /74 (BP Location: Right arm, Patient Position: Sitting, BP Cuff Size: Adult)   Pulse 82   Temp 36.6 °C (97.8 °F) (Temporal)   Resp 18   Ht 1.6 m (5' 3\")   Wt 96.6 kg (213 lb)   LMP 01/01/2009   SpO2 97%   BMI 37.73 kg/m²  Body mass index is 37.73 kg/m².    Gen: Alert and oriented, No apparent distress.  Neck: Neck is supple without lymphadenopathy.  Lungs: Normal effort, CTA bilaterally, no wheezes, rhonchi, or rales  CV: Regular rate and rhythm. No  rubs, or gallops.  There is a 2/6 systolic murmur.  Loudest in the aortic area.  Ext: No clubbing, cyanosis.  Patient has trace pitting edema to the lower extremities.      Labs: Reviewed and ordered    Assessment & Plan:     68 y.o. female with the following -     1. Murmur  This is a new finding.  Will refer to cardiology for evaluation and likely repeat echo.  - REFERRAL TO CARDIOLOGY    2. Mild peripheral edema  This is a new issue.  Labs will be ordered to recheck on other causes.  She is to pay more attention to the salt in her diet as well.  She declined a diuretic at this time.  - Comp Metabolic Panel; Future  - TSH WITH REFLEX TO FT4; Future  - ESTIMATED GFR; Future    3. Rheumatoid arthritis with rheumatoid factor of right hand without organ or systems involvement (HCC)  This is a chronic problem.  Continue care with rheumatologist.    4. Type 2 diabetes mellitus with hyperglycemia, with long-term current use of insulin (HCC)  This is a chronic problem.  Continue care through Dr. Beyer.    5. Other constipation  This is a chronic problem.  Adding fiber or supplements into her diet discussed.  Use laxatives only as needed.    HCC Gap Form    Diagnosis to address: M05.741 - Rheumatoid arthritis with " rheumatoid factor of right hand without organ or systems involvement (HCC)  Assessment and plan: Chronic, stable, as based on today's assessment and impact on other conditions evaluated today. Continue with current treatment plan: By other specialist.  Follow-up with specialist as directed, but at least annually.  Last edited 02/06/24 08:16 PST by Link Polanco III, M.D.         Return in about 6 months (around 8/6/2024) for Long.    Please note that this dictation was created using voice recognition software. I have made every reasonable attempt to correct obvious errors, but I expect that there are errors of grammar and possibly content that I did not discover before finalizing the note.

## 2024-02-06 NOTE — ASSESSMENT & PLAN NOTE
This is a chronic problem.  Patient states she has had troubles with chronic constipation all of her life.  She is now exercising a little more and taking lots of water.  She does have fiber in her diet.  I suggested that she add in fiber supplements and see if that helps.  She does use laxatives as needed.

## 2024-02-06 NOTE — ASSESSMENT & PLAN NOTE
This is a new finding.  Today she does have a 2/6 systolic murmur.  This has not been noted before.  Echocardiogram was done 2 and half years ago in preparation for preop and no significant valvular disease is seen at that time.

## 2024-02-06 NOTE — ASSESSMENT & PLAN NOTE
This is a chronic problem.  Her hemoglobin's A1c is higher at 8.5%.  She is working with Dr. Beyer to find a treatment regimen that works for her.

## 2024-02-06 NOTE — ASSESSMENT & PLAN NOTE
This is a new problem.  Patient states for about the last 2 months she has had troubles with swelling in her legs.  It is a present when she gets up in the morning and persist throughout the day.  Today actually is a little better than it has been.  She has had a cough that has been nagging for the last 6 months.  She is also having fatigue.  She is on a CPAP and last saw sleep clinic last May.  She does not have a lot of salt in her diet.

## 2024-02-07 LAB
ALBUMIN SERPL BCP-MCNC: 4.2 G/DL (ref 3.2–4.9)
ALBUMIN/GLOB SERPL: 1.2 G/DL
ALP SERPL-CCNC: 131 U/L (ref 30–99)
ALT SERPL-CCNC: 12 U/L (ref 2–50)
ANION GAP SERPL CALC-SCNC: 13 MMOL/L (ref 7–16)
AST SERPL-CCNC: 15 U/L (ref 12–45)
BILIRUB SERPL-MCNC: 0.4 MG/DL (ref 0.1–1.5)
BUN SERPL-MCNC: 12 MG/DL (ref 8–22)
CALCIUM ALBUM COR SERPL-MCNC: 9.6 MG/DL (ref 8.5–10.5)
CALCIUM SERPL-MCNC: 9.8 MG/DL (ref 8.5–10.5)
CHLORIDE SERPL-SCNC: 104 MMOL/L (ref 96–112)
CO2 SERPL-SCNC: 23 MMOL/L (ref 20–33)
CREAT SERPL-MCNC: 0.55 MG/DL (ref 0.5–1.4)
FASTING STATUS PATIENT QL REPORTED: NORMAL
GFR SERPLBLD CREATININE-BSD FMLA CKD-EPI: 99 ML/MIN/1.73 M 2
GLOBULIN SER CALC-MCNC: 3.4 G/DL (ref 1.9–3.5)
GLUCOSE SERPL-MCNC: 164 MG/DL (ref 65–99)
POTASSIUM SERPL-SCNC: 4 MMOL/L (ref 3.6–5.5)
PROT SERPL-MCNC: 7.6 G/DL (ref 6–8.2)
SODIUM SERPL-SCNC: 140 MMOL/L (ref 135–145)

## 2024-02-13 ENCOUNTER — APPOINTMENT (OUTPATIENT)
Dept: CARDIOLOGY | Facility: MEDICAL CENTER | Age: 69
End: 2024-02-13
Attending: FAMILY MEDICINE
Payer: COMMERCIAL

## 2024-02-13 ENCOUNTER — OFFICE VISIT (OUTPATIENT)
Dept: CARDIOLOGY | Facility: MEDICAL CENTER | Age: 69
End: 2024-02-13
Attending: NURSE PRACTITIONER
Payer: COMMERCIAL

## 2024-02-13 VITALS
WEIGHT: 215 LBS | DIASTOLIC BLOOD PRESSURE: 64 MMHG | HEIGHT: 63 IN | OXYGEN SATURATION: 93 % | HEART RATE: 95 BPM | SYSTOLIC BLOOD PRESSURE: 136 MMHG | BODY MASS INDEX: 38.09 KG/M2 | RESPIRATION RATE: 16 BRPM

## 2024-02-13 DIAGNOSIS — I10 ESSENTIAL HYPERTENSION: ICD-10-CM

## 2024-02-13 DIAGNOSIS — R01.1 MURMUR: ICD-10-CM

## 2024-02-13 DIAGNOSIS — R60.0 LOCALIZED EDEMA: ICD-10-CM

## 2024-02-13 DIAGNOSIS — Z79.4 TYPE 2 DIABETES MELLITUS WITH HYPERGLYCEMIA, WITH LONG-TERM CURRENT USE OF INSULIN (HCC): ICD-10-CM

## 2024-02-13 DIAGNOSIS — R94.31 NONSPECIFIC ABNORMAL ELECTROCARDIOGRAM (ECG) (EKG): ICD-10-CM

## 2024-02-13 DIAGNOSIS — E11.65 TYPE 2 DIABETES MELLITUS WITH HYPERGLYCEMIA, WITH LONG-TERM CURRENT USE OF INSULIN (HCC): ICD-10-CM

## 2024-02-13 DIAGNOSIS — G47.33 OSA ON CPAP: ICD-10-CM

## 2024-02-13 LAB — EKG IMPRESSION: NORMAL

## 2024-02-13 PROCEDURE — 93010 ELECTROCARDIOGRAM REPORT: CPT | Performed by: INTERNAL MEDICINE

## 2024-02-13 PROCEDURE — 93005 ELECTROCARDIOGRAM TRACING: CPT | Performed by: NURSE PRACTITIONER

## 2024-02-13 PROCEDURE — 99213 OFFICE O/P EST LOW 20 MIN: CPT | Performed by: NURSE PRACTITIONER

## 2024-02-13 PROCEDURE — 3078F DIAST BP <80 MM HG: CPT | Performed by: NURSE PRACTITIONER

## 2024-02-13 PROCEDURE — 99214 OFFICE O/P EST MOD 30 MIN: CPT | Performed by: NURSE PRACTITIONER

## 2024-02-13 PROCEDURE — 3075F SYST BP GE 130 - 139MM HG: CPT | Performed by: NURSE PRACTITIONER

## 2024-02-13 RX ORDER — FUROSEMIDE 20 MG/1
20 TABLET ORAL DAILY
Qty: 60 TABLET | Refills: 1 | Status: SHIPPED | OUTPATIENT
Start: 2024-02-13

## 2024-02-13 ASSESSMENT — ENCOUNTER SYMPTOMS
CLAUDICATION: 0
ORTHOPNEA: 0
PND: 0
PALPITATIONS: 0
SHORTNESS OF BREATH: 0
WEAKNESS: 0
WEIGHT LOSS: 0
DIZZINESS: 0

## 2024-02-13 ASSESSMENT — FIBROSIS 4 INDEX: FIB4 SCORE: 0.76

## 2024-02-13 NOTE — PROGRESS NOTES
Chief Complaint   Patient presents with    New Patient     N/P Dx:Murmur  N/P Dx:Nonspecific abnormal electrocardiogram (ECG) (EKG)       Subjective     Ledy Hui is a 68 y.o. female patient initially seen by Dr. Ridley due to abnormal EKG (LAFB) who presents today after encouragement from her PCP Dr. Polanco due to new murmur and recent c/o BLE edema.     Patient was last seen by myself on 10/19/2021. Her MPI showed no evidence of ischemia. TTE showed normal LV function with no WMA. Patient was advised to follow up in one year.     PMH pertinent for RA, T2DM, HTN, HLD, JASPER and obesity.     Today patient states that for the last 2 months she has been having progressive lower extremity edema and fatigue. Patient is having a difficult time sleeping with her CPAP, she has an apt with pulmonary next week to discuss updating her CPAP settings. Denies chest pain, palpitations, dizziness or syncope. Feels that her BP is elevated here today due to anxious about her visit and concerned something is wrong with one of her heart valves.     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    Bronchitis     Essential hypertension, benign     High cholesterol     Hyperlipidemia     Mixed hyperlipidemia     Organic sleep apnea, unspecified     Osteopenia     Renal disorder 04/2019    kidney stones    Sleep apnea     Type 2 diabetes mellitus with microalbuminuria, with long-term current use of insulin (HCC) 02/28/2023    Type II or unspecified type diabetes mellitus without mention of complication, not stated as uncontrolled     oral meds     Past Surgical History:   Procedure Laterality Date    VT CYSTOSCOPY,INSERT URETERAL STENT Bilateral 07/05/2021    Procedure: CYSTOSCOPY, WITH URETERAL STENT INSERTION - POSSIBLE STENT;  Surgeon: Italo Fair M.D.;  Location: SURGERY [facility];  Service: Urology    VT CYSTO/URETERO/PYELOSCOPY, DX Bilateral  2021    Procedure: URETEROSCOPY;  Surgeon: Italo Fair M.D.;  Location: SURGERY [facility];  Service: Urology    LASERTRIPSY Bilateral 2021    Procedure: LITHOTRIPSY, USING LASER.;  Surgeon: Italo Fair M.D.;  Location: SURGERY [facility];  Service: Urology    CYSTOSCOPY N/A 2019    Procedure: CYSTOSCOPY;  Surgeon: Espinoza Orr M.D.;  Location: SURGERY [facility];  Service: Urology    URETEROSCOPY Right 2019    Procedure: URETEROSCOPY;  Surgeon: Espinoza Orr M.D.;  Location: SURGERY [facility];  Service: Urology    LASERTRIPSY Right 2019    Procedure: LITHOTRIPSY, USING LASER;  Surgeon: Espinoza Orr M.D.;  Location: SURGERY [facility];  Service: Urology    STENT PLACEMENT Right 2019    Procedure: STENT PLACEMENT;  Surgeon: Espinoza Orr M.D.;  Location: SURGERY [facility];  Service: Urology    OTHER  2019    lithotripsy    COLONOSCOPY WITH POLYP  10/2007    tubular adenoma    OTHER ORTHOPEDIC SURGERY N/A 1998    laminectomy    EMBOLECTOMY      remove blood clot from spinal cord    LAMINOTOMY       Family History   Problem Relation Age of Onset    Arthritis Mother     Lung Disease Mother         COPD    Hypertension Mother     Hyperlipidemia Mother     Heart Disease Father          @ 41 from heart attack    Hyperlipidemia Father     Heart Disease Brother         CAD   S/P CABG    Cancer Maternal Grandmother         Pancreatic cancer    Cancer Brother         Prostate cancer     Social History     Socioeconomic History    Marital status:      Spouse name: Not on file    Number of children: Not on file    Years of education: Not on file    Highest education level: Associate degree: academic program   Occupational History    Not on file   Tobacco Use    Smoking status: Never    Smokeless tobacco: Never   Vaping Use    Vaping Use: Never used   Substance and Sexual Activity    Alcohol use: Never    Drug use: Never     Sexual activity: Not Currently     Partners: Male     Birth control/protection: Post-Menopausal   Other Topics Concern    Not on file   Social History Narrative    Not on file     Social Determinants of Health     Financial Resource Strain: Low Risk  (1/23/2024)    Overall Financial Resource Strain (CARDIA)     Difficulty of Paying Living Expenses: Not hard at all   Food Insecurity: No Food Insecurity (1/23/2024)    Hunger Vital Sign     Worried About Running Out of Food in the Last Year: Never true     Ran Out of Food in the Last Year: Never true   Transportation Needs: No Transportation Needs (1/23/2024)    PRAPARE - Transportation     Lack of Transportation (Medical): No     Lack of Transportation (Non-Medical): No   Physical Activity: Insufficiently Active (12/11/2022)    Exercise Vital Sign     Days of Exercise per Week: 2 days     Minutes of Exercise per Session: 30 min   Stress: No Stress Concern Present (12/11/2022)    [employer] - Occupational Stress Questionnaire     Feeling of Stress : Not at all   Social Connections: Moderately Isolated (12/11/2022)    Social Connection and Isolation Panel [NHANES]     Frequency of Communication with Friends and Family: More than three times a week     Frequency of Social Gatherings with Friends and Family: Once a week     Attends Mosque Services: Never     Active Member of Clubs or Organizations: No     Attends Club or Organization Meetings: Never     Marital Status:    Intimate Partner Violence: Not on file   Housing Stability: Low Risk  (1/23/2024)    Housing Stability Vital Sign     Unable to Pay for Housing in the Last Year: No     Number of Places Lived in the Last Year: 1     Unstable Housing in the Last Year: No     Allergies   Allergen Reactions    Amoxicillin Vomiting     Per patient happened only 1 time.      Outpatient Encounter Medications as of 2/13/2024   Medication Sig Dispense Refill    furosemide (LASIX) 20 MG Tab  "Take 1 Tablet by mouth every day. 60 Tablet 1    glimepiride (AMARYL) 4 MG Tab TAKE 1 TABLET BY MOUTH TWICE A DAY 60 Tablet 9    Continuous Blood Gluc Sensor (FREESTYLE ETELVINA 2 SENSOR) Misc USE 1 EACH EVERY 14 DAYS. 6 Each 3    metformin (GLUCOPHAGE) 1000 MG tablet TAKE 1 TABLET BY MOUTH TWICE A DAY WITH MEALS 180 Tablet 2    amlodipine-valsartan (EXFORGE)  MG per tablet Take 1 Tablet by mouth every day. 100 Tablet 1    B Complex Vitamins (VITAMIN B COMPLEX PO) Take  by mouth. Once daily      VITAMIN D PO Take  by mouth. Once dialy      TRULICITY 4.5 MG/0.5ML Solution Pen-injector INJECT 4.5 MG UNDER THE SKIN ONCE WEEKLY 6 mL 2    rosuvastatin (CRESTOR) 20 MG Tab Take 1 Tablet by mouth every day. 100 Tablet 3    Insulin Glargine, 1 Unit Dial, (TOUJEO SOLOSTAR) 300 UNIT/ML Solution Pen-injector Inject 50 Units under the skin every day. 15 mL 3    Continuous Blood Gluc  (FREESTYLE ETELVINA 2 READER) Device 1 Each continuous. 1 Each 0    Multiple Vitamin (MULTIVITAMIN ADULT PO) Take  by mouth.      aspirin 81 MG tablet Take 81 mg by mouth every day.      Insulin Lispro-aabc, 1 U Dial, (LYUMJEV KWIKPEN) 100 UNIT/ML Solution Pen-injector Inject 4 Units under the skin 3 times a day before meals. 5 pens (Patient not taking: Reported on 2/13/2024) 15 mL 11     No facility-administered encounter medications on file as of 2/13/2024.     Review of Systems   Constitutional:  Positive for malaise/fatigue. Negative for weight loss.   Respiratory:  Negative for shortness of breath.    Cardiovascular:  Positive for leg swelling. Negative for chest pain, palpitations, orthopnea, claudication and PND.   Neurological:  Negative for dizziness and weakness.   All other systems reviewed and are negative.             Objective     /64 (BP Location: Left arm, Patient Position: Sitting, BP Cuff Size: Adult)   Pulse 95   Resp 16   Ht 1.6 m (5' 3\")   Wt 97.5 kg (215 lb)   LMP 01/01/2009   SpO2 93%   BMI 38.09 kg/m² "     Physical Exam  Constitutional:       General: She is not in acute distress.     Appearance: She is well-developed.   HENT:      Head: Normocephalic.   Eyes:      Extraocular Movements: Extraocular movements intact.   Neck:      Vascular: No JVD.   Cardiovascular:      Rate and Rhythm: Normal rate and regular rhythm.      Heart sounds: Murmur heard.      Systolic murmur is present with a grade of 2/6.   Pulmonary:      Effort: No respiratory distress.      Breath sounds: Normal breath sounds.   Musculoskeletal:      Right lower leg: Edema (trace) present.      Left lower leg: Edema (trace) present.   Skin:     General: Skin is warm and dry.   Neurological:      Mental Status: She is alert and oriented to person, place, and time.   Psychiatric:         Mood and Affect: Mood normal.         Behavior: Behavior normal.         Thought Content: Thought content normal.                Assessment & Plan     1. Nonspecific abnormal electrocardiogram (ECG) (EKG)  EKG    EC-ECHOCARDIOGRAM COMPLETE W/O CONT      2. Localized edema  EC-ECHOCARDIOGRAM COMPLETE W/O CONT    furosemide (LASIX) 20 MG Tab    Basic Metabolic Panel      3. Essential hypertension        4. JASPER on CPAP        5. Type 2 diabetes mellitus with hyperglycemia, with long-term current use of insulin (HCC)        6. Murmur            Medical Decision Making: Today's Assessment/Status/Plan:        Systolic Murmur:  - TTE ordered.     BLE edema:  - TTE ordered.  - Could be due to high dose amlodipine. Patient wishes to have echo completed prior to trying trial discontinuation.   - Start Lasix 20 mg daily.  - Repeat BMP in 7-10 days to evaluate renal function and potassium levels.     HLD:  - LDL well controlled in September (63).  - Continue Crestor 20 mg daily.      LAFB:  - Low risk MPI in 2021  - TTE in October 2021 showed preserved LV function with no WMA.     HTN:  - Well controlled at recent PCP apt. Likely mildly elevated here today due to being anxious.    - Continue amlodipine-valsartan  mg daily.      DM:  - A1C improving. Follwed by PCP.     JASPER:  - FU with pulmonary next week to adjust CPAP settings.     Patient will follow up as scheduled below or earlier if needed. Encouraged patient to contact our office should any questions or concerns arise in the mean time.     Future Appointments   Date Time Provider Department Center   2/19/2024  1:15 PM [facility] ECHO 1 [facility]   2/21/2024  2:00 PM SHANIQUA Rousseau [facility] None   3/13/2024  8:15 AM SHANIQUA Valle [facility] None   3/21/2024  9:10 AM Trent Timmons M.D. [facility]

## 2024-02-16 NOTE — PROGRESS NOTES
[facility] Follow-up Visit    Date of Visit: 2/21/2024     CC:  Follow-up for JASPER management      HPI:  Zena Hui is a very pleasant 67 y.o. year old female never smoker, with a PMHx of JASPER on CPAP, elevated BMI, DSL, T2DM, HTN who presented to the [facility] for a regular follow up. Last seen in the office on 5/15/2023 with myself.     Patient presents for annual compliance.  Patient states that she has been having issues falling asleep.  She states that she has significant anxiety at night.  She will take Advil PM to help her sleep.  She would also wake up in the middle of the night and drink some Rauna tea and have difficulty falling back to sleep, which will take hours.  She also states that she has daytime drowsiness and not drowsiness while driving.  She denies any significant morning headaches, dry mouth, snoring, gasping, apneas, palpitations, sleep, or skin irritation.  Patient goes to bed between 9-9:30 PM and wakes up between 5-7 AM.  She will nap once a day for 5-30 minutes.  She would like to try a new CPAP mask.    DME provider:  [facility]  Device:  ResMed AirSense 10  Settings:  AutoCPAP 5-12 CWP  When:  March 2021  Mask:   FFM  Chin strap: No     Cleaning regimen: Once a week with dish soap and water    Compliance:  Compliance data reviewed showing 40% usage > 4hours in last 30 days. Average AHI 1.6 events/hour. 95% pressure 11.3 CWP. 95% leaks 17.8 L/min. Patient continues to use and benefit from machine.        Sleep History:  HSS 7/30/2020-      Patient Active Problem List    Diagnosis Date Noted    JASPER on CPAP 06/17/2009    Insomnia 02/21/2024    Murmur 02/06/2024    Mild peripheral edema 02/06/2024    Rheumatoid arthritis with rheumatoid factor of right hand without organ or systems involvement (HCC) 02/06/2024    Dyslipidemia associated with type 2 diabetes mellitus (HCC) 01/23/2024    Anxiety with flying 12/06/2023    Plantar fasciitis of left foot 10/04/2023     Microalbuminuria due to type 2 diabetes mellitus ([facility]) 08/11/2023    Rheumatoid factor positive 08/11/2023    Seasonal allergic rhinitis due to pollen 06/19/2023    Diabetic nephropathy associated with diabetes mellitus due to underlying condition ([facility]) 05/18/2023    Other constipation 05/16/2023    Arthralgia 05/16/2023    BMI 37.0-37.9, adult 08/01/2022    Long-term insulin use ([facility]) 05/09/2022    Severe obesity (BMI 35.0-39.9) with comorbidity ([facility]) 11/03/2021    Type 2 diabetes mellitus with hyperglycemia, with long-term current use of insulin ([facility]) 01/12/2012    Vitamin d deficiency 03/11/2010    Essential hypertension 06/17/2009    Osteopenia 06/17/2009     Past Medical History:   Diagnosis Date    Arthritis 2021    fingers    Blood clotting disorder ([facility])     in spinal cord    Bronchitis     Essential hypertension, benign     High cholesterol     Hyperlipidemia     Mixed hyperlipidemia     Organic sleep apnea, unspecified     Osteopenia     Renal disorder 04/2019    kidney stones    Sleep apnea     Type 2 diabetes mellitus with microalbuminuria, with long-term current use of insulin ([facility]) 02/28/2023    Type II or unspecified type diabetes mellitus without mention of complication, not stated as uncontrolled     oral meds      Past Surgical History:   Procedure Laterality Date    LA CYSTOSCOPY,INSERT URETERAL STENT Bilateral 07/05/2021    Procedure: CYSTOSCOPY, WITH URETERAL STENT INSERTION - POSSIBLE STENT;  Surgeon: Italo Fair M.D.;  Location: [facility];  Service: Urology    LA CYSTO/URETERO/PYELOSCOPY, DX Bilateral 07/05/2021    Procedure: URETEROSCOPY;  Surgeon: Italo Fair M.D.;  Location: [facility];  Service: Urology    LASERTRIPSY Bilateral 07/05/2021    Procedure: LITHOTRIPSY, USING LASER.;  Surgeon: Italo Fair M.D.;  Location: [facility];  Service: Urology    CYSTOSCOPY N/A 07/05/2019    Procedure: CYSTOSCOPY;  Surgeon: Espinoza Orr M.D.;  Location:  [facility];  Service: Urology    URETEROSCOPY Right 2019    Procedure: URETEROSCOPY;  Surgeon: Espinoza Orr M.D.;  Location: SUR[facility]ERY [facility];  Service: Urology    LASERTRIPSY Right 2019    Procedure: LITHOTRIPSY, USING LASER;  Surgeon: Espinoza Orr M.D.;  Location: SURGERY [facility];  Service: Urology    STENT PLACEMENT Right 2019    Procedure: STENT PLACEMENT;  Surgeon: Espinoza Orr M.D.;  Location: SURGERY [facility];  Service: Urology    OTHER  2019    lithotripsy    COLONOSCOPY WITH POLYP  10/2007    tubular adenoma    OTHER ORTHOPEDIC SURGERY N/A 1998    laminectomy    EMBOLECTOMY      remove blood clot from spinal cord    LAMINOTOMY       Family History   Problem Relation Age of Onset    Arthritis Mother     Lung Disease Mother         COPD    Hypertension Mother     Hyperlipidemia Mother     Heart Disease Father          @ 41 from heart attack    Hyperlipidemia Father     Heart Disease Brother         CAD   S/P CABG    Cancer Maternal Grandmother         Pancreatic cancer    Cancer Brother         Prostate cancer     Social History     Socioeconomic History    Marital status:      Spouse name: Not on file    Number of children: Not on file    Years of education: Not on file    Highest education level: Associate degree: academic program   Occupational History    Not on file   Tobacco Use    Smoking status: Never    Smokeless tobacco: Never   Vaping Use    Vaping Use: Never used   Substance and Sexual Activity    Alcohol use: Never    Drug use: Never    Sexual activity: Not Currently     Partners: Male     Birth control/protection: Post-Menopausal   Other Topics Concern    Not on file   Social History Narrative    Not on file     Social Determinants of Health     Financial Resource Strain: Low Risk  (2024)    Overall Financial Resource Strain (CARDIA)     Difficulty of Paying Living Expenses: Not hard at all   Food  Insecurity: No Food Insecurity (1/23/2024)    Hunger Vital Sign     Worried About Running Out of Food in the Last Year: Never true     Ran Out of Food in the Last Year: Never true   Transportation Needs: No Transportation Needs (1/23/2024)    PRAPARE - Transportation     Lack of Transportation (Medical): No     Lack of Transportation (Non-Medical): No   Physical Activity: Insufficiently Active (12/11/2022)    Exercise Vital Sign     Days of Exercise per Week: 2 days     Minutes of Exercise per Session: 30 min   Stress: No Stress Concern Present (12/11/2022)    [employer] - Occupational Stress Questionnaire     Feeling of Stress : Not at all   Social Connections: Moderately Isolated (12/11/2022)    Social Connection and Isolation Panel [NHANES]     Frequency of Communication with Friends and Family: More than three times a week     Frequency of Social Gatherings with Friends and Family: Once a week     Attends Confucianism Services: Never     Active Member of Clubs or Organizations: No     Attends Club or Organization Meetings: Never     Marital Status:    Intimate Partner Violence: Not on file   Housing Stability: Low Risk  (1/23/2024)    Housing Stability Vital Sign     Unable to Pay for Housing in the Last Year: No     Number of Places Lived in the Last Year: 1     Unstable Housing in the Last Year: No     Current Outpatient Medications   Medication Sig Dispense Refill    amlodipine-valsartan (EXFORGE)  MG per tablet TAKE 1 TABLET BY MOUTH EVERY DAY 90 Tablet 3    furosemide (LASIX) 20 MG Tab Take 1 Tablet by mouth every day. 60 Tablet 1    glimepiride (AMARYL) 4 MG Tab TAKE 1 TABLET BY MOUTH TWICE A DAY 60 Tablet 9    Insulin Lispro-aabc, 1 U Dial, (LYUMJEV KWIKPEN) 100 UNIT/ML Solution Pen-injector Inject 4 Units under the skin 3 times a day before meals. 5 pens (Patient not taking: Reported on 2/13/2024) 15 mL 11    Continuous Blood Gluc Sensor (FREESTYLE ETELVINA 2 SENSOR)  "Misc USE 1 EACH EVERY 14 DAYS. 6 Each 3    metformin (GLUCOPHAGE) 1000 MG tablet TAKE 1 TABLET BY MOUTH TWICE A DAY WITH MEALS 180 Tablet 2    B Complex Vitamins (VITAMIN B COMPLEX PO) Take  by mouth. Once daily      VITAMIN D PO Take  by mouth. Once dialy      TRULICITY 4.5 MG/0.5ML Solution Pen-injector INJECT 4.5 MG UNDER THE SKIN ONCE WEEKLY 6 mL 2    rosuvastatin (CRESTOR) 20 MG Tab Take 1 Tablet by mouth every day. 100 Tablet 3    Insulin Glargine, 1 Unit Dial, (TOUJEO SOLOSTAR) 300 UNIT/ML Solution Pen-injector Inject 50 Units under the skin every day. 15 mL 3    Continuous Blood Gluc  (FREESTYLE ETELVINA 2 READER) Device 1 Each continuous. 1 Each 0    Multiple Vitamin (MULTIVITAMIN ADULT PO) Take  by mouth.      aspirin 81 MG tablet Take 81 mg by mouth every day.       No current facility-administered medications for this visit.      ALLERGIES: Amoxicillin    ROS:  Constitutional: Denies fever, chills, sweats,  weight loss, fatigue  Cardiovascular: Denies chest pain, tightness, palpitations, swelling in legs/feet  Respiratory: Denies shortness of breath, cough, sputum, wheezing, painful breathing   Sleep: per HPI  Gastrointestinal: Denies  difficulty swallowing, nausea, abdominal pain, diarrhea, constipation, heartburn.  Musculoskeletal: Denies painful joints, sore muscles,       PHYSICAL EXAM:  /70 (BP Location: Right arm, Patient Position: Sitting, BP Cuff Size: Adult)   Pulse 92   Ht 1.6 m (5' 3\")   Wt 95.3 kg (210 lb)   LMP 01/01/2009   SpO2 94%   BMI 37.20 kg/m²   Appearance: Well-nourished, well-developed, no acute distress  Eyes:  No scleral icterus , EOMI  ENMT: No redness of the oropharynx  Lung auscultation:  No wheezes rhonchi rubs or rales  Cardiac: No murmurs, rubs, or gallops; regular rhythm, normal rate; no edema  Musculoskeletal:  Grossly normal; gait and station normal; digits and nails normal  Skin:  No rashes, petechiae, cyanosis  Neurologic: without focal signs; oriented " to person, time, place, and purpose; judgement intact  Psychiatric:  No depression, anxiety, agitation  Mallampati score: Class III    Assessment and Plan:    The medical record was reviewed.    Diagnostic and titration nocturnal polysomnogram's, home sleep apnea tests, continuous nocturnal oximetry results, multiple sleep latency tests, and compliance reports reviewed.    Problem List Items Addressed This Visit          Pulmonary/Sleep Medicine Problems    JASPER on CPAP     Sleep Apnea:    The pathophysiology of sleep anea and the increased risk of cardiovascular morbidity from untreated sleep apnea is discussed in detail with the patient.  Urged to avoid supine sleep, weight gain and alcoholic beverages since all of these can worsen sleep apnea. Cautioned against drowsy driving. If feeling sleepy while driving, pull over for a break/nap, rather than persist on the road, in the interest of own safety and that of others on the road.    Compliance download was reviewed and discussed with the patient.     - Order placed for mask and supplies to [facility]  - Order placed for mask fitting to [facility]  - Compliance was reinforced  - Recommended the patient against the use of Ozone , such as SoClean  - Clean supplies a couple times a week with dish soap and water and air dry  - Recommended the patient change out supplies as recommended for best mask fit and usage of the machine  - Advised patient to reach out via [employer] if any questions or concerns should arise.   - Equipment replacement schedule:  Mask cushion every month  Nasal pillows 2 times per month  Mask every 6 months  Head gear every 6 months  Tubing every 3 months  Ultra-fine filters 2 times per month  Foam filter every 6 months  Humidifier chamber every 6 months  Chin strap every 6 months    Has been advised to continue the current CPAP, clean equipment frequently, and get new mask and supplies as allowed by insurance and DME. Recommend an earlier appointment,  if significant treatment barriers develop.    The risks of untreated sleep apnea were discussed with the patient at length. Patients with sleep apnea are at increased risk of cardiovascular disease including coronary artery disease, systemic arterial hypertension, pulmonary arterial hypertension, cardiac arrythmias, and stroke. The patient was advised to avoid driving a motor vehicle when drowsy.    Positive airway pressure will favorably impact many of the adverse conditions and effects provoked by sleep apnea.           Relevant Orders    DME Mask and Supplies    DME Mask Fitting       Other    Insomnia     Patient has new and worsening daytime fatigue and drowsiness while driving.  She has not been using her CPAP continuously.  She also states that she wake up in the middle of the night and have caffeinated tea.  She will also take Advil PM at night to help her sleep.  --Reviewed sleep hygiene  --Advised patient to not take caffeine at night  --Provided patient with CBT-I resources  --Provided patient with referral to CBT-I therapy         Relevant Orders    Referral to Other     Have advised the patient to follow up with the appropriate healthcare practitioners for all other medical problems and issues.    Return in about 6 months (around 8/21/2024), or if symptoms worsen or fail to improve, for compliance, with Jazz.    Please note portions of this record was created using voice recognition software. I have made every reasonable attempt to correct obvious errors, but I expect that there are errors of grammar and possibly content I did not discover before finalizing the note.    Time spent in record review prior to patient arrival, reviewing results, and in face-to-face encounter totaled 20 min.  __________  GINGER Harris  Pulmonary & Sleep Medicine  Count includes the [facility]

## 2024-02-19 ENCOUNTER — HOSPITAL ENCOUNTER (OUTPATIENT)
Dept: CARDIOLOGY | Facility: MEDICAL CENTER | Age: 69
End: 2024-02-19
Attending: NURSE PRACTITIONER
Payer: COMMERCIAL

## 2024-02-19 DIAGNOSIS — R60.0 LOCALIZED EDEMA: ICD-10-CM

## 2024-02-19 DIAGNOSIS — R94.31 NONSPECIFIC ABNORMAL ELECTROCARDIOGRAM (ECG) (EKG): ICD-10-CM

## 2024-02-19 LAB — LV EJECT FRACT  99904: 55

## 2024-02-19 PROCEDURE — 93306 TTE W/DOPPLER COMPLETE: CPT

## 2024-02-19 PROCEDURE — 93306 TTE W/DOPPLER COMPLETE: CPT | Mod: 26 | Performed by: INTERNAL MEDICINE

## 2024-02-21 ENCOUNTER — OFFICE VISIT (OUTPATIENT)
Dept: SLEEP MEDICINE | Facility: MEDICAL CENTER | Age: 69
End: 2024-02-21
Payer: COMMERCIAL

## 2024-02-21 VITALS
OXYGEN SATURATION: 94 % | HEART RATE: 92 BPM | DIASTOLIC BLOOD PRESSURE: 70 MMHG | WEIGHT: 210 LBS | BODY MASS INDEX: 37.21 KG/M2 | HEIGHT: 63 IN | SYSTOLIC BLOOD PRESSURE: 122 MMHG

## 2024-02-21 DIAGNOSIS — G47.00 INSOMNIA, UNSPECIFIED TYPE: ICD-10-CM

## 2024-02-21 DIAGNOSIS — G47.33 OSA ON CPAP: ICD-10-CM

## 2024-02-21 PROCEDURE — 3074F SYST BP LT 130 MM HG: CPT

## 2024-02-21 PROCEDURE — 3078F DIAST BP <80 MM HG: CPT

## 2024-02-21 PROCEDURE — 99213 OFFICE O/P EST LOW 20 MIN: CPT

## 2024-02-21 RX ORDER — AMLODIPINE AND VALSARTAN 10; 320 MG/1; MG/1
1 TABLET ORAL DAILY
Qty: 90 TABLET | Refills: 3 | Status: SHIPPED | OUTPATIENT
Start: 2024-02-21

## 2024-02-21 ASSESSMENT — FIBROSIS 4 INDEX: FIB4 SCORE: 0.76

## 2024-02-21 NOTE — TELEPHONE ENCOUNTER
Received request via: Pharmacy    Was the patient seen in the last year in this department? Yes    Does the patient have an active prescription (recently filled or refills available) for medication(s) requested? No    Pharmacy Name:     [facility]/pharmacy #[id number] - [place], [place] - [address]       Does the patient have half-way Plus and need 100 day supply (blood pressure, diabetes and cholesterol meds only)? Patient does not have SCP

## 2024-02-21 NOTE — ASSESSMENT & PLAN NOTE
Patient has new and worsening daytime fatigue and drowsiness while driving.  She has not been using her CPAP continuously.  She also states that she wake up in the middle of the night and have caffeinated tea.  She will also take Advil PM at night to help her sleep.  --Reviewed sleep hygiene  --Advised patient to not take caffeine at night  --Provided patient with CBT-I resources  --Provided patient with referral to CBT-I therapy

## 2024-02-21 NOTE — ASSESSMENT & PLAN NOTE
Sleep Apnea:    The pathophysiology of sleep anea and the increased risk of cardiovascular morbidity from untreated sleep apnea is discussed in detail with the patient.  Urged to avoid supine sleep, weight gain and alcoholic beverages since all of these can worsen sleep apnea. Cautioned against drowsy driving. If feeling sleepy while driving, pull over for a break/nap, rather than persist on the road, in the interest of own safety and that of others on the road.    Compliance download was reviewed and discussed with the patient.     - Order placed for mask and supplies to [facility]  - Order placed for mask fitting to [facility]  - Compliance was reinforced  - Recommended the patient against the use of Ozone , such as SoClean  - Clean supplies a couple times a week with dish soap and water and air dry  - Recommended the patient change out supplies as recommended for best mask fit and usage of the machine  - Advised patient to reach out via [employer] if any questions or concerns should arise.   - Equipment replacement schedule:  Mask cushion every month  Nasal pillows 2 times per month  Mask every 6 months  Head gear every 6 months  Tubing every 3 months  Ultra-fine filters 2 times per month  Foam filter every 6 months  Humidifier chamber every 6 months  Chin strap every 6 months    Has been advised to continue the current CPAP, clean equipment frequently, and get new mask and supplies as allowed by insurance and DME. Recommend an earlier appointment, if significant treatment barriers develop.    The risks of untreated sleep apnea were discussed with the patient at length. Patients with sleep apnea are at increased risk of cardiovascular disease including coronary artery disease, systemic arterial hypertension, pulmonary arterial hypertension, cardiac arrythmias, and stroke. The patient was advised to avoid driving a motor vehicle when drowsy.    Positive airway pressure will favorably impact many of the adverse  conditions and effects provoked by sleep apnea.

## 2024-02-21 NOTE — PATIENT INSTRUCTIONS
"  It was a pleasure meeting you today. Here are a list of resources for self-guided CTBI.    CBT-I Books  Kaylee Mind: Turn Off Your Noisy Thoughts and Get a Good Night's Sleep - Aurea Moura,  and Taina Hardy Phd  Accessible, enjoyable, and grounded in evidence-based cognitive behavioral therapy (CBT), Kaylee Mind directly addresses the effects of rumination?or having an overactive brain?on your ability to sleep well. Written by two psychologists who specialize in sleep disorders, the book contains helpful exercises and insights into how you can better manage your thoughts at bedtime, and finally get some sleep.    Insomnia Solved: A Self-Directed Cognitive Behavioral Therapy for Insomnia (CBTI) Program - Kuldeep Heart MD  Cognitive behavioral therapy for insomnia (CBTI) is often structured as a 6-week treatment program that can help people who have difficulty falling asleep, staying asleep, or find that sleep is unrefreshing. CBTI is scientifically proven, highly effective, and does not rely on medications. CBTI has life-long benefits and most participants report improved sleep satisfaction. Insomnia Solved is based on the core features of this treatment.    Quiet Your Mind and Get to Sleep: Solutions to Insomnia for Those with Depression, Anxiety or Chronic Pain - Taina Hardy, PhD  This workbook uses cognitive behavior therapy, which has been shown to work as well as sleep medications and produce longer-lasting effects. Research shows that it also works well for those whose insomnia is experienced in the context of anxiety, depression, and chronic pain. The complete program in this book goes to the root of your insomnia and offers the same techniques used by experienced sleep specialists.    \"Say Kaylee to Insomnia\" by Nick Dodd     \"No More Sleepless Nights\" by Wu Hernandez    CBT-I Online Resources  Path to Better Sleep (free) - https://www.veterantraining.va.gov/insomnia/index.asp   This " "free online Cognitive Behavioral Therapy for Insomnia course, which was developed for veterans, takes you through a sleep \"check-in\" and the various components of CBT-I, including modifying unhelpful behaviors and unhelpful thoughts around sleep.    Insomnia Solved - Kuldeep Heart MD ($79) - http://www.PixleeonLinkurious.[employer]/fix-my-insomnia/   Insomnia Solved is a self-guided CBTI program created by Kuldeep Heart M.D., a board-certified medical doctor, that is priced inexpensively at $79. The complete program includes full access to exclusive multimedia content, including the 154-page eBook and online modules and audiofiles.    REAC Fuel - Roxy Grider, PhD, [facility] ($129) - https://Notifixious/   SleepfitCountercepts is an insomnia program based on Cognitive Behavioral Treatment for Insomnia (CBTi) and is a 6-week self-guided online format. IT costs $219 for a 1-year subscription. You can sign up for the 7-day free trial and learn how CBTi can improve your sleep.    Apps  Insomnia  (free)   Offers a self-guided 5-week training plan designed to change sleep habits.  https://[employer].va.gov/xiomy/insomnia-     Practice sleep hygiene by keeping a good sleep environment:    1.  Removing distractions such as television, computers and other engaging activities.  2.  Keep the sleep room dark, cool and quiet  3.  Commit to a consistent bedtime and wake up time  4.  Avoid staying in bed while awake for greater than 20-30 minutes. If unable to fall asleep during this time then recommend going to a different location in the house to engage in a relaxation activity prior to returning to bed.   5. Recommend finishing meals 3 hours before bedtime especially if you are prone to indigestion or heartburn.  6.  Avoid smoking for at least 3 hours before bedtime.  7.  Recommend engaging in regular physical activity daily (its ok to exercise prior to bedtime).  8.  Avoid stressful situations before bedtime  9.  Avoid napping " during the daytime or limiting the nap to less than 20 minutes.  10. Go to bed only when tired.  11. Limited activities in bed to sleep and sex  12. Recommend engaging in relaxing activities prior to bed such as reading, writing, listening to calming music, meditation, stretching or taking a bath.     RemFresh Melatonin 2mg

## 2024-02-25 DIAGNOSIS — E11.65 TYPE 2 DIABETES MELLITUS WITH HYPERGLYCEMIA, WITH LONG-TERM CURRENT USE OF INSULIN (HCC): ICD-10-CM

## 2024-02-25 DIAGNOSIS — Z79.4 TYPE 2 DIABETES MELLITUS WITH HYPERGLYCEMIA, WITH LONG-TERM CURRENT USE OF INSULIN (HCC): ICD-10-CM

## 2024-02-26 RX ORDER — GLIMEPIRIDE 4 MG/1
TABLET ORAL
Qty: 180 TABLET | Refills: 4 | Status: SHIPPED | OUTPATIENT
Start: 2024-02-26

## 2024-02-29 ENCOUNTER — HOSPITAL ENCOUNTER (OUTPATIENT)
Dept: RADIOLOGY | Facility: MEDICAL CENTER | Age: 69
End: 2024-02-29
Attending: PHYSICIAN ASSISTANT
Payer: COMMERCIAL

## 2024-02-29 ENCOUNTER — HOSPITAL ENCOUNTER (OUTPATIENT)
Dept: LAB | Facility: MEDICAL CENTER | Age: 69
End: 2024-02-29
Attending: NURSE PRACTITIONER
Payer: COMMERCIAL

## 2024-02-29 ENCOUNTER — APPOINTMENT (OUTPATIENT)
Dept: LAB | Facility: MEDICAL CENTER | Age: 69
End: 2024-02-29
Payer: COMMERCIAL

## 2024-02-29 DIAGNOSIS — R60.0 LOCALIZED EDEMA: ICD-10-CM

## 2024-02-29 DIAGNOSIS — N20.0 CALCULUS OF KIDNEY: ICD-10-CM

## 2024-02-29 LAB
ANION GAP SERPL CALC-SCNC: 12 MMOL/L (ref 7–16)
BUN SERPL-MCNC: 13 MG/DL (ref 8–22)
CALCIUM SERPL-MCNC: 9.2 MG/DL (ref 8.5–10.5)
CHLORIDE SERPL-SCNC: 108 MMOL/L (ref 96–112)
CO2 SERPL-SCNC: 23 MMOL/L (ref 20–33)
CREAT SERPL-MCNC: 0.6 MG/DL (ref 0.5–1.4)
GFR SERPLBLD CREATININE-BSD FMLA CKD-EPI: 97 ML/MIN/1.73 M 2
GLUCOSE SERPL-MCNC: 195 MG/DL (ref 65–99)
POTASSIUM SERPL-SCNC: 4.1 MMOL/L (ref 3.6–5.5)
SODIUM SERPL-SCNC: 143 MMOL/L (ref 135–145)

## 2024-02-29 PROCEDURE — 74018 RADEX ABDOMEN 1 VIEW: CPT

## 2024-02-29 PROCEDURE — 36415 COLL VENOUS BLD VENIPUNCTURE: CPT

## 2024-02-29 PROCEDURE — 80048 BASIC METABOLIC PNL TOTAL CA: CPT

## 2024-03-10 ENCOUNTER — PATIENT MESSAGE (OUTPATIENT)
Dept: CARDIOLOGY | Facility: MEDICAL CENTER | Age: 69
End: 2024-03-10
Payer: COMMERCIAL

## 2024-03-11 DIAGNOSIS — Z79.4 TYPE 2 DIABETES MELLITUS WITH HYPERGLYCEMIA, WITH LONG-TERM CURRENT USE OF INSULIN (HCC): ICD-10-CM

## 2024-03-11 DIAGNOSIS — E11.65 TYPE 2 DIABETES MELLITUS WITH HYPERGLYCEMIA, WITH LONG-TERM CURRENT USE OF INSULIN (HCC): ICD-10-CM

## 2024-03-11 RX ORDER — INSULIN GLARGINE 300 U/ML
INJECTION, SOLUTION SUBCUTANEOUS
Qty: 13.5 ML | Refills: 3 | Status: SHIPPED | OUTPATIENT
Start: 2024-03-11 | End: 2024-03-20

## 2024-03-11 RX ORDER — DULAGLUTIDE 4.5 MG/.5ML
INJECTION, SOLUTION SUBCUTANEOUS
Qty: 6 ML | Refills: 3 | Status: SHIPPED | OUTPATIENT
Start: 2024-03-11 | End: 2024-03-20

## 2024-03-12 NOTE — PROGRESS NOTES
Chief Complaint   Patient presents with    Follow-Up     F/V Dx: Nonspecific abnormal electrocardiogram (ECG) (EKG)       Subjective     Ledy Hui is a 68 y.o. female patient initially seen by Dr. Ridley for follow up.    Patient was last seen by myself on 2/13/2024 after being sent by her PCP due to new systolic murmur with c/o BLE swelling. She was started on lasix 20 mg daily and an echo was ordered.     TTE showed preserved LV function with no significant valvular abnormity, some mild concentric LVH and mildly dilated LV.     PMH pertinent for RA, T2DM, HTN, HLD, JASPER and obesity.     Today patient states that she only took the lasix for two days and her BLE resolved and she has not needed to take any more doses of lasix. Denies having any cardiac concerns or complaints. 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    Bronchitis     Essential hypertension, benign     High cholesterol     Hyperlipidemia     Mixed hyperlipidemia     Organic sleep apnea, unspecified     Osteopenia     Renal disorder 04/2019    kidney stones    Sleep apnea     Type 2 diabetes mellitus with microalbuminuria, with long-term current use of insulin ([facility]) 02/28/2023    Type II or unspecified type diabetes mellitus without mention of complication, not stated as uncontrolled     oral meds     Past Surgical History:   Procedure Laterality Date    UT CYSTOSCOPY,INSERT URETERAL STENT Bilateral 07/05/2021    Procedure: CYSTOSCOPY, WITH URETERAL STENT INSERTION - POSSIBLE STENT;  Surgeon: Itlao Fair M.D.;  Location: SURGERY [facility];  Service: Urology    UT CYSTO/URETERO/PYELOSCOPY, DX Bilateral 07/05/2021    Procedure: URETEROSCOPY;  Surgeon: Italo Fair M.D.;  Location: [facility];  Service: Urology    LASERTRIPSY Bilateral 07/05/2021    Procedure: LITHOTRIPSY,  USING LASER.;  Surgeon: Italo Fair M.D.;  Location: SURGERY [facility];  Service: Urology    CYSTOSCOPY N/A 2019    Procedure: CYSTOSCOPY;  Surgeon: Espinoza Orr M.D.;  Location: SURGERY [facility];  Service: Urology    URETEROSCOPY Right 2019    Procedure: URETEROSCOPY;  Surgeon: Espinoza Orr M.D.;  Location: SURGERY [facility];  Service: Urology    LASERTRIPSY Right 2019    Procedure: LITHOTRIPSY, USING LASER;  Surgeon: Espinoza Orr M.D.;  Location: SURGERY [facility];  Service: Urology    STENT PLACEMENT Right 2019    Procedure: STENT PLACEMENT;  Surgeon: Espinoza Orr M.D.;  Location: SURGERY [facility];  Service: Urology    OTHER  2019    lithotripsy    COLONOSCOPY WITH POLYP  10/2007    tubular adenoma    OTHER ORTHOPEDIC SURGERY N/A 1998    laminectomy    EMBOLECTOMY      remove blood clot from spinal cord    LAMINOTOMY       Family History   Problem Relation Age of Onset    Arthritis Mother     Lung Disease Mother         COPD    Hypertension Mother     Hyperlipidemia Mother     Heart Disease Father          @ 41 from heart attack    Hyperlipidemia Father     Heart Disease Brother         CAD   S/P CABG    Cancer Maternal Grandmother         Pancreatic cancer    Cancer Brother         Prostate cancer     Social History     Socioeconomic History    Marital status:      Spouse name: Not on file    Number of children: Not on file    Years of education: Not on file    Highest education level: Associate degree: academic program   Occupational History    Not on file   Tobacco Use    Smoking status: Never    Smokeless tobacco: Never   Vaping Use    Vaping Use: Never used   Substance and Sexual Activity    Alcohol use: Never    Drug use: Never    Sexual activity: Not Currently     Partners: Male     Birth control/protection: Post-Menopausal   Other Topics Concern    Not on file   Social History Narrative    Not on file      Social Determinants of Health     Financial Resource Strain: Low Risk  (1/23/2024)    Overall Financial Resource Strain (CARDIA)     Difficulty of Paying Living Expenses: Not hard at all   Food Insecurity: No Food Insecurity (1/23/2024)    Hunger Vital Sign     Worried About Running Out of Food in the Last Year: Never true     Ran Out of Food in the Last Year: Never true   Transportation Needs: No Transportation Needs (1/23/2024)    PRAPARE - Transportation     Lack of Transportation (Medical): No     Lack of Transportation (Non-Medical): No   Physical Activity: Insufficiently Active (12/11/2022)    Exercise Vital Sign     Days of Exercise per Week: 2 days     Minutes of Exercise per Session: 30 min   Stress: No Stress Concern Present (12/11/2022)    [employer] - Occupational Stress Questionnaire     Feeling of Stress : Not at all   Social Connections: Moderately Isolated (12/11/2022)    Social Connection and Isolation Panel [NHANES]     Frequency of Communication with Friends and Family: More than three times a week     Frequency of Social Gatherings with Friends and Family: Once a week     Attends Mandaen Services: Never     Active Member of Clubs or Organizations: No     Attends Club or Organization Meetings: Never     Marital Status:    Intimate Partner Violence: Not on file   Housing Stability: Low Risk  (1/23/2024)    Housing Stability Vital Sign     Unable to Pay for Housing in the Last Year: No     Number of Places Lived in the Last Year: 1     Unstable Housing in the Last Year: No     Allergies   Allergen Reactions    Amoxicillin Vomiting     Per patient happened only 1 time.      Outpatient Encounter Medications as of 3/13/2024   Medication Sig Dispense Refill    TRULICITY 4.5 MG/0.5ML Solution Pen-injector INJECT THE CONTENTS OF ONE PEN  SUBCUTANEOUSLY WEEKLY AS  DIRECTED 6 mL 3    TOUJEO SOLOSTAR 300 UNIT/ML Solution Pen-injector INJECT 50 UNITS UNDER THE SKIN   "DAILY 13.5 mL 3    glimepiride (AMARYL) 4 MG Tab TAKE 1 TABLET BY MOUTH TWICE A  Tablet 4    amlodipine-valsartan (EXFORGE)  MG per tablet TAKE 1 TABLET BY MOUTH EVERY DAY 90 Tablet 3    furosemide (LASIX) 20 MG Tab Take 1 Tablet by mouth every day. 60 Tablet 1    Insulin Lispro-aabc, 1 U Dial, (CHRISUMTONY SOFIAPEN) 100 UNIT/ML Solution Pen-injector Inject 4 Units under the skin 3 times a day before meals. 5 pens 15 mL 11    Continuous Blood Gluc Sensor (FREESTYLE ETELVINA 2 SENSOR) Misc USE 1 EACH EVERY 14 DAYS. 6 Each 3    metformin (GLUCOPHAGE) 1000 MG tablet TAKE 1 TABLET BY MOUTH TWICE A DAY WITH MEALS 180 Tablet 2    B Complex Vitamins (VITAMIN B COMPLEX PO) Take  by mouth. Once daily      VITAMIN D PO Take  by mouth. Once dialy      rosuvastatin (CRESTOR) 20 MG Tab Take 1 Tablet by mouth every day. 100 Tablet 3    Continuous Blood Gluc  (FREESTYLE ETELVINA 2 READER) Device 1 Each continuous. 1 Each 0    Multiple Vitamin (MULTIVITAMIN ADULT PO) Take  by mouth.      aspirin 81 MG tablet Take 81 mg by mouth every day.       No facility-administered encounter medications on file as of 3/13/2024.     ROS           Objective     /76 (BP Location: Left arm, Patient Position: Sitting, BP Cuff Size: Large adult)   Pulse 80   Resp 14   Ht 1.6 m (5' 3\")   Wt 96.2 kg (212 lb)   LMP 01/01/2009   SpO2 95%   BMI 37.55 kg/m²     Physical Exam  Constitutional:       General: She is not in acute distress.     Appearance: She is well-developed.   HENT:      Head: Normocephalic.   Eyes:      Extraocular Movements: Extraocular movements intact.   Neck:      Vascular: No carotid bruit or JVD.   Cardiovascular:      Rate and Rhythm: Normal rate and regular rhythm.      Heart sounds: Murmur heard.      Systolic murmur is present with a grade of 2/6.   Pulmonary:      Effort: No respiratory distress.      Breath sounds: Normal breath sounds.   Abdominal:      General: There is no distension.   Musculoskeletal: "      Cervical back: Normal range of motion.      Right lower leg: No edema.      Left lower leg: No edema.   Skin:     General: Skin is warm and dry.   Neurological:      Mental Status: She is alert and oriented to person, place, and time.   Psychiatric:         Mood and Affect: Mood normal.         Behavior: Behavior normal.         Thought Content: Thought content normal.                Assessment & Plan     1. Localized edema        2. Essential hypertension        3. Murmur        4. JASPER on CPAP        5. Nonspecific abnormal electrocardiogram (ECG) (EKG)        6. LVH (left ventricular hypertrophy)        7. Type 2 diabetes mellitus with hyperglycemia, with long-term current use of insulin (HCC)              Medical Decision Making: Today's Assessment/Status/Plan:        Systolic Murmur:  - No significant valvular abnormality on TTE. I am not hearing any carotid bruit that could be radiating.    BLE edema:  - Resoled after two doses of lasix.  - Continue lasix 20 mg prn, call if having to take frequently.     HLD:  - LDL well controlled in September (63).  - Continue Crestor 20 mg daily.      LAFB:  - Low risk MPI in 2021  - TTE in October 2021 showed preserved LV function with no WMA.  - Recent EKG showed no significant changes.      HTN:  - Well controlled.  - Mild concentric LVH on TTE.   - Continue amlodipine-valsartan  mg daily.      DM:  - A1C improving. Follwed by PCP.     JASPER:  - Compliant with CPAP use. Followed by pulmonary.     Patient will follow up with Dr. Ridley as scheduled below or earlier if needed. Encouraged patient to contact our office should any questions or concerns arise in the mean time.     Future Appointments   Date Time Provider Department Center   3/21/2024  9:10 AM KYLE Kan   8/21/2024  8:00 AM SHANIQUA Rousseau [facility] None   9/18/2024  8:45 AM Jaswant Ridley M.D. [facility] None

## 2024-03-13 ENCOUNTER — OFFICE VISIT (OUTPATIENT)
Dept: CARDIOLOGY | Facility: MEDICAL CENTER | Age: 69
End: 2024-03-13
Attending: NURSE PRACTITIONER
Payer: COMMERCIAL

## 2024-03-13 VITALS
OXYGEN SATURATION: 95 % | DIASTOLIC BLOOD PRESSURE: 76 MMHG | RESPIRATION RATE: 14 BRPM | HEIGHT: 63 IN | WEIGHT: 212 LBS | BODY MASS INDEX: 37.56 KG/M2 | SYSTOLIC BLOOD PRESSURE: 122 MMHG | HEART RATE: 80 BPM

## 2024-03-13 DIAGNOSIS — R01.1 MURMUR: ICD-10-CM

## 2024-03-13 DIAGNOSIS — R60.0 LOCALIZED EDEMA: ICD-10-CM

## 2024-03-13 DIAGNOSIS — I10 ESSENTIAL HYPERTENSION: ICD-10-CM

## 2024-03-13 DIAGNOSIS — Z79.4 TYPE 2 DIABETES MELLITUS WITH HYPERGLYCEMIA, WITH LONG-TERM CURRENT USE OF INSULIN (HCC): ICD-10-CM

## 2024-03-13 DIAGNOSIS — G47.33 OSA ON CPAP: ICD-10-CM

## 2024-03-13 DIAGNOSIS — E11.65 TYPE 2 DIABETES MELLITUS WITH HYPERGLYCEMIA, WITH LONG-TERM CURRENT USE OF INSULIN (HCC): ICD-10-CM

## 2024-03-13 DIAGNOSIS — R94.31 NONSPECIFIC ABNORMAL ELECTROCARDIOGRAM (ECG) (EKG): ICD-10-CM

## 2024-03-13 DIAGNOSIS — I51.7 LVH (LEFT VENTRICULAR HYPERTROPHY): ICD-10-CM

## 2024-03-13 PROCEDURE — 3078F DIAST BP <80 MM HG: CPT | Performed by: NURSE PRACTITIONER

## 2024-03-13 PROCEDURE — 99214 OFFICE O/P EST MOD 30 MIN: CPT | Performed by: NURSE PRACTITIONER

## 2024-03-13 PROCEDURE — 3074F SYST BP LT 130 MM HG: CPT | Performed by: NURSE PRACTITIONER

## 2024-03-13 PROCEDURE — 99213 OFFICE O/P EST LOW 20 MIN: CPT | Performed by: NURSE PRACTITIONER

## 2024-03-13 ASSESSMENT — FIBROSIS 4 INDEX: FIB4 SCORE: 0.76

## 2024-03-14 DIAGNOSIS — E55.9 VITAMIN D DEFICIENCY: ICD-10-CM

## 2024-03-14 DIAGNOSIS — E78.5 DYSLIPIDEMIA: ICD-10-CM

## 2024-03-14 DIAGNOSIS — E11.65 TYPE 2 DIABETES MELLITUS WITH HYPERGLYCEMIA, WITH LONG-TERM CURRENT USE OF INSULIN (HCC): ICD-10-CM

## 2024-03-14 DIAGNOSIS — Z79.4 TYPE 2 DIABETES MELLITUS WITH HYPERGLYCEMIA, WITH LONG-TERM CURRENT USE OF INSULIN (HCC): ICD-10-CM

## 2024-03-15 ENCOUNTER — HOSPITAL ENCOUNTER (OUTPATIENT)
Dept: LAB | Facility: MEDICAL CENTER | Age: 69
End: 2024-03-15
Attending: INTERNAL MEDICINE
Payer: COMMERCIAL

## 2024-03-15 DIAGNOSIS — Z79.4 TYPE 2 DIABETES MELLITUS WITH HYPERGLYCEMIA, WITH LONG-TERM CURRENT USE OF INSULIN (HCC): ICD-10-CM

## 2024-03-15 DIAGNOSIS — E11.65 TYPE 2 DIABETES MELLITUS WITH HYPERGLYCEMIA, WITH LONG-TERM CURRENT USE OF INSULIN (HCC): ICD-10-CM

## 2024-03-15 DIAGNOSIS — E55.9 VITAMIN D DEFICIENCY: ICD-10-CM

## 2024-03-15 DIAGNOSIS — E78.5 DYSLIPIDEMIA: ICD-10-CM

## 2024-03-15 LAB
25(OH)D3 SERPL-MCNC: 45 NG/ML (ref 30–100)
ANION GAP SERPL CALC-SCNC: 15 MMOL/L (ref 7–16)
BUN SERPL-MCNC: 14 MG/DL (ref 8–22)
CALCIUM SERPL-MCNC: 9.6 MG/DL (ref 8.5–10.5)
CHLORIDE SERPL-SCNC: 104 MMOL/L (ref 96–112)
CHOLEST SERPL-MCNC: 146 MG/DL (ref 100–199)
CO2 SERPL-SCNC: 24 MMOL/L (ref 20–33)
CREAT SERPL-MCNC: 0.61 MG/DL (ref 0.5–1.4)
CREAT UR-MCNC: 39.17 MG/DL
EST. AVERAGE GLUCOSE BLD GHB EST-MCNC: 194 MG/DL
FASTING STATUS PATIENT QL REPORTED: NORMAL
GFR SERPLBLD CREATININE-BSD FMLA CKD-EPI: 97 ML/MIN/1.73 M 2
GLUCOSE SERPL-MCNC: 125 MG/DL (ref 65–99)
HBA1C MFR BLD: 8.4 % (ref 4–5.6)
HDLC SERPL-MCNC: 60 MG/DL
LDLC SERPL CALC-MCNC: 63 MG/DL
MICROALBUMIN UR-MCNC: 37.2 MG/DL
MICROALBUMIN/CREAT UR: 950 MG/G (ref 0–30)
POTASSIUM SERPL-SCNC: 3.9 MMOL/L (ref 3.6–5.5)
SODIUM SERPL-SCNC: 143 MMOL/L (ref 135–145)
TRIGL SERPL-MCNC: 114 MG/DL (ref 0–149)

## 2024-03-15 PROCEDURE — 82043 UR ALBUMIN QUANTITATIVE: CPT

## 2024-03-15 PROCEDURE — 82306 VITAMIN D 25 HYDROXY: CPT

## 2024-03-15 PROCEDURE — 83036 HEMOGLOBIN GLYCOSYLATED A1C: CPT

## 2024-03-15 PROCEDURE — 80061 LIPID PANEL: CPT

## 2024-03-15 PROCEDURE — 80048 BASIC METABOLIC PNL TOTAL CA: CPT

## 2024-03-15 PROCEDURE — 36415 COLL VENOUS BLD VENIPUNCTURE: CPT

## 2024-03-15 PROCEDURE — 82570 ASSAY OF URINE CREATININE: CPT

## 2024-03-20 DIAGNOSIS — E11.65 TYPE 2 DIABETES MELLITUS WITH HYPERGLYCEMIA, WITH LONG-TERM CURRENT USE OF INSULIN (HCC): ICD-10-CM

## 2024-03-20 DIAGNOSIS — Z79.4 TYPE 2 DIABETES MELLITUS WITH HYPERGLYCEMIA, WITH LONG-TERM CURRENT USE OF INSULIN (HCC): ICD-10-CM

## 2024-03-20 RX ORDER — FLUCONAZOLE 150 MG/1
150 TABLET ORAL DAILY
Qty: 1 TABLET | Refills: 0 | Status: SHIPPED | OUTPATIENT
Start: 2024-03-20

## 2024-03-20 RX ORDER — INSULIN GLARGINE 300 U/ML
INJECTION, SOLUTION SUBCUTANEOUS
Qty: 13.5 ML | Refills: 3 | Status: SHIPPED | OUTPATIENT
Start: 2024-03-20

## 2024-03-20 RX ORDER — DULAGLUTIDE 4.5 MG/.5ML
INJECTION, SOLUTION SUBCUTANEOUS
Qty: 6 ML | Refills: 3 | Status: SHIPPED | OUTPATIENT
Start: 2024-03-20

## 2024-03-20 NOTE — PROGRESS NOTES
"RN-CDE Note    Subjective:   Endocrinology Clinic Progress Note  PCP: Link Polanco III, M.D.    HPI:  Zena Hui is a 68 y.o. old patient who is seen today by the Diabetes Nurse Specialist for review of her uncontrolled type 2 diabetes on insulin.    Recent changes in health: diagnosed with heart murmer.  Occasional pedal edema.    DM:   Last A1c:   Lab Results   Component Value Date/Time    HBA1C 8.4 (H) 03/15/2024 06:25 AM      Previous A1c was 8.5 on 1/2/24  A1C GOAL: < 7    Diabetes Medications:   Toujeo 50 units per day  Lyumjev 4 units with meals  Trulicity 4.5 mg weekly  Metformin 1000 mg bid  Glimepiride 4 mg bid      Exercise: doing adriano chi and walking several times a week.   Diet: \"healthy\" diet  in general  Patient's body mass index is 37.41 kg/m². Exercise and nutrition counseling were performed at this visit.    Glucose monitoring frequency: using cgm     Hypoglycemic episodes: no  Last Retinal Exam: on file and up-to-date  Daily Foot Exam: Yes  denies problems.   Foot Exam:  Monofilament: current.   Lab Results   Component Value Date/Time    MALBCRT 950 (H) 03/15/2024 06:25 AM    MICROALBUR 37.2 03/15/2024 06:25 AM        ACR Albumin/Creatinine Ratio goal <30     HTN:   Blood pressure goal <130/<80 .   Currently Rx ACE/ARB: No     Dyslipidemia:    Lab Results   Component Value Date/Time    CHOLSTRLTOT 146 03/15/2024 06:25 AM    LDL 63 03/15/2024 06:25 AM    HDL 60 03/15/2024 06:25 AM    TRIGLYCERIDE 114 03/15/2024 06:25 AM         Currently Rx Statin: Yes     She  reports that she has never smoked. She has never used smokeless tobacco.      Plan:     Discussed and educated on:   - All medications, side effects and compliance (discussed carefully)  - Annual eye examinations at Ophthalmology  - Foot Care:   - HbA1C:   - Home glucose monitoring emphasized  - Weight control and daily exercise    Recommended medication changes: increase basal insulin and bolus insulin    "

## 2024-03-21 ENCOUNTER — OFFICE VISIT (OUTPATIENT)
Dept: ENDOCRINOLOGY | Facility: MEDICAL CENTER | Age: 69
End: 2024-03-21
Attending: INTERNAL MEDICINE
Payer: COMMERCIAL

## 2024-03-21 VITALS
HEIGHT: 63 IN | HEART RATE: 89 BPM | DIASTOLIC BLOOD PRESSURE: 62 MMHG | WEIGHT: 211.2 LBS | RESPIRATION RATE: 16 BRPM | BODY MASS INDEX: 37.42 KG/M2 | SYSTOLIC BLOOD PRESSURE: 126 MMHG | OXYGEN SATURATION: 94 %

## 2024-03-21 DIAGNOSIS — E55.9 VITAMIN D DEFICIENCY: ICD-10-CM

## 2024-03-21 DIAGNOSIS — I10 ESSENTIAL HYPERTENSION: ICD-10-CM

## 2024-03-21 DIAGNOSIS — E11.65 TYPE 2 DIABETES MELLITUS WITH HYPERGLYCEMIA, WITH LONG-TERM CURRENT USE OF INSULIN (HCC): ICD-10-CM

## 2024-03-21 DIAGNOSIS — E78.5 DYSLIPIDEMIA: ICD-10-CM

## 2024-03-21 DIAGNOSIS — E08.21 DIABETIC NEPHROPATHY ASSOCIATED WITH DIABETES MELLITUS DUE TO UNDERLYING CONDITION (HCC): ICD-10-CM

## 2024-03-21 DIAGNOSIS — R63.2 BINGE EATING: ICD-10-CM

## 2024-03-21 DIAGNOSIS — Z79.4 TYPE 2 DIABETES MELLITUS WITH HYPERGLYCEMIA, WITH LONG-TERM CURRENT USE OF INSULIN (HCC): ICD-10-CM

## 2024-03-21 PROCEDURE — 3078F DIAST BP <80 MM HG: CPT | Performed by: INTERNAL MEDICINE

## 2024-03-21 PROCEDURE — 3074F SYST BP LT 130 MM HG: CPT | Performed by: INTERNAL MEDICINE

## 2024-03-21 PROCEDURE — 99214 OFFICE O/P EST MOD 30 MIN: CPT | Performed by: INTERNAL MEDICINE

## 2024-03-21 PROCEDURE — 99215 OFFICE O/P EST HI 40 MIN: CPT | Performed by: INTERNAL MEDICINE

## 2024-03-21 RX ORDER — ROSUVASTATIN CALCIUM 20 MG/1
20 TABLET, COATED ORAL DAILY
Qty: 30 TABLET | Refills: 13 | Status: SHIPPED | OUTPATIENT
Start: 2024-03-21

## 2024-03-21 ASSESSMENT — FIBROSIS 4 INDEX: FIB4 SCORE: 0.76

## 2024-03-21 NOTE — PROGRESS NOTES
"CHIEF COMPLAINT: Patient is here for follow up of Type 2 Diabetes Mellitus    HPI:     Zena Hui is a 68 y.o. female with Type 2 Diabetes Mellitus here for follow up.    Labs from 3/15/2024 show A1c is 8.4%  Labs from 1/2/2024 show A1c was 8.5%  Labs from 9/19/2023 show A1c was 7.9%  Labs from 5/18/2023 show A1c was 8.3%  Labs from 8/15/2022 show A1c was 9.7%    She tried and failed Farxiga due to recurrent UTIs  She has been wearing a Christina 2 CGM for over 6 mos  She completed an overnight DST on 5/2023 to screen for hypercortisolism and her 8 am cortisol was suppressed at 1.0 Dex level was 545  She reports that Juli worked but her insurance would not cover it         On follow up she is taking  Toujeo 50u daily  Lyumjev 4u QAC  Trulicity 4.5mg weekly  Metformin 1000mg bid  Glimepiride 4mg bid      She denies side effects on her medications  She has a tendency to binge eat  She states that \"she eats food to feel better\"  She reports that ever since she was 10 y/o she discovered that cooking for people makes her happy  She has an appt  with psychologist or therapist (Dr. Melva Greenfield) in the future for insomnia  We discussed that she should share the binge eating issues with her psychiatrist         Download of her Christina 2 CGM shows  Average  (196) with TIR 38% (37%)          She has hyperlipidemia and is on Crestor 20 mg daily  She denies a history of coronary artery disease and cerebrovascular disease   Latest Reference Range & Units 03/15/24 06:25   Cholesterol,Tot 100 - 199 mg/dL 146   Triglycerides 0 - 149 mg/dL 114   HDL >=40 mg/dL 60   LDL <100 mg/dL 63         She does have diabetic kidney disease  She is on an ARB (telmisartan)  She cant tolerate SGLT2 inhibitors   Her blood pressure is fairly controlled   Latest Reference Range & Units 03/15/24 06:25   Micro Alb Creat Ratio 0 - 30 mg/g 950 (H)   Creatinine, Urine mg/dL 39.17   Microalbumin, Urine Random mg/dL 37.2           Her last eye exam " was on Oct 2023 with Dr. Lantigua but we dont have records           BG Diary:  CGM was downloaded and reviewed     Weight has decreased 5 pounds over last 3 mos    Diabetes Complications   Retinopathy: No known retinopathy.  Last eye exam: Oct 2023  Neuropathy: Denies paresthesias or numbness in hands or feet. Denies any foot wounds.  Exercise: Minimal.  Diet: Fair.  Patient's medications, allergies, and social histories were reviewed and updated as appropriate.    ROS:     CONS:     No fever, no chills   EYES:     No diplopia, no blurry vision   CV:           No chest pain, no palpitations   PULM:     No SOB, no cough, no hemoptysis.   GI:            No nausea, no vomiting, no diarrhea, no constipation   ENDO:     No polyuria, no polydipsia, no heat intolerance, no cold intolerance       Past Medical History:  Problem List:  2024-02: Insomnia  2024-02: Murmur  2024-02: Mild peripheral edema  2024-02: Rheumatoid arthritis with rheumatoid factor of right hand   without organ or systems involvement ([facility])  2024-01: Dyslipidemia associated with type 2 diabetes mellitus ([facility])  2023-12: Anxiety with flying  2023-10: Plantar fasciitis of left foot  2023-10: Encounter for screening mammogram for malignant neoplasm of   breast  2023-08: Microalbuminuria due to type 2 diabetes mellitus ([facility])  2023-08: Rheumatoid factor positive  2023-06: Seasonal allergic rhinitis due to pollen  2023-05: Diabetic nephropathy associated with diabetes mellitus due   to underlying condition ([facility])  2023-05: Other constipation  2023-05: Arthralgia  2023-02: Encounter to establish care  2023-02: Type 2 diabetes mellitus with microalbuminuria ([facility])  2023-02: Type 2 diabetes mellitus with microalbuminuria, with long-  term current use of insulin ([facility])  2022-08: BMI 37.0-37.9, adult  2022-08: Diabetes mellitus with coincident hypertension ([facility])  2022-08: Hyperlipidemia associated with type 2 diabetes mellitus ([facility])  2022-08: Uncontrolled type 2  diabetes mellitus with hyperglycemia   ([facility])  2022-08: Microalbuminuria due to type 2 diabetes mellitus ([facility])  2022-05: Dyslipidemia  2022-05: Long-term insulin use ([facility])  2021-11: Severe obesity (BMI 35.0-39.9) with comorbidity ([facility])  2021-11: Sedative, hypnotic, or anxiolytic dependence ([facility])  2019-07: Body mass index (BMI) 37.0-37.9, adult  2012-01: Type 2 diabetes mellitus with hyperglycemia, with long-term   current use of insulin ([facility])  2010-03: Vitamin d deficiency  2009-06: Diabetes  2009-06: Elevated cholesterol  2009-06: Essential hypertension  2009-06: Osteopenia  2009-06: JASPER on CPAP        Past Surgical History:  Past Surgical History:   Procedure Laterality Date    TX CYSTOSCOPY,INSERT URETERAL STENT Bilateral 07/05/2021    Procedure: CYSTOSCOPY, WITH URETERAL STENT INSERTION - POSSIBLE STENT;  Surgeon: Italo Fair M.D.;  Location: [facility];  Service: Urology    TX CYSTO/URETERO/PYELOSCOPY, DX Bilateral 07/05/2021    Procedure: URETEROSCOPY;  Surgeon: Italo Fair M.D.;  Location: [facility];  Service: Urology    LASERTRIPSY Bilateral 07/05/2021    Procedure: LITHOTRIPSY, USING LASER.;  Surgeon: Italo Fair M.D.;  Location: [facility];  Service: Urology    CYSTOSCOPY N/A 07/05/2019    Procedure: CYSTOSCOPY;  Surgeon: Espinoza Orr M.D.;  Location: [facility];  Service: Urology    URETEROSCOPY Right 07/05/2019    Procedure: URETEROSCOPY;  Surgeon: Espinoza Orr M.D.;  Location: [facility];  Service: Urology    LASERTRIPSY Right 07/05/2019    Procedure: LITHOTRIPSY, USING LASER;  Surgeon: Espinoza Orr M.D.;  Location: [facility];  Service: Urology    STENT PLACEMENT Right 07/05/2019    Procedure: STENT PLACEMENT;  Surgeon: Espinoza Orr M.D.;  Location: [facility];  Service: Urology    OTHER  07/2019    lithotripsy    COLONOSCOPY WITH POLYP  10/2007    tubular adenoma    OTHER ORTHOPEDIC SURGERY  "N/A 04/1998    laminectomy    EMBOLECTOMY  1998    remove blood clot from spinal cord    LAMINOTOMY          Allergies:  Amoxicillin     Social History:  Social History     Tobacco Use    Smoking status: Never    Smokeless tobacco: Never   Vaping Use    Vaping Use: Never used   Substance Use Topics    Alcohol use: Never    Drug use: Never        Family History:   family history includes Arthritis in her mother; Cancer in her brother and maternal grandmother; Heart Disease in her brother and father; Hyperlipidemia in her father and mother; Hypertension in her mother; Lung Disease in her mother.      PHYSICAL EXAM:   OBJECTIVE:  Vital signs: /62 (BP Location: Left arm, Patient Position: Sitting, BP Cuff Size: Large adult)   Pulse 89   Resp 16   Ht 1.6 m (5' 3\")   Wt 95.8 kg (211 lb 3.2 oz)   LMP 01/01/2009   SpO2 94%   BMI 37.41 kg/m²   GENERAL: Well-developed, well-nourished in no apparent distress.   EYE:  No ocular asymmetry, PERRLA  HENT: Pink, moist mucous membranes.    NECK: No thyromegaly.   CARDIOVASCULAR:  No murmurs  LUNGS: Clear breath sounds  ABDOMEN: Soft, nontender   EXTREMITIES: No clubbing, cyanosis, or edema.   NEUROLOGICAL: No gross focal motor abnormalities   LYMPH: No cervical adenopathy palpated.   SKIN: No rashes, lesions.     Labs:  Lab Results   Component Value Date/Time    HBA1C 8.4 (H) 03/15/2024 06:25 AM        Lab Results   Component Value Date/Time    WBC 10.0 11/18/2022 06:18 AM    WBC 7.0 07/20/2010 08:50 AM    RBC 5.47 (H) 11/18/2022 06:18 AM    RBC 4.96 07/20/2010 08:50 AM    HEMOGLOBIN 14.9 11/18/2022 06:18 AM    MCV 87.6 11/18/2022 06:18 AM    MCV 90 07/20/2010 08:50 AM    MCH 27.2 11/18/2022 06:18 AM    MCH 28.0 07/20/2010 08:50 AM    MCHC 31.1 (L) 11/18/2022 06:18 AM    RDW 43.9 11/18/2022 06:18 AM    RDW 15.2 (H) 07/20/2010 08:50 AM    MPV 9.7 11/18/2022 06:18 AM       Lab Results   Component Value Date/Time    SODIUM 143 03/15/2024 06:25 AM    POTASSIUM 3.9 " 03/15/2024 06:25 AM    CHLORIDE 104 03/15/2024 06:25 AM    CO2 24 03/15/2024 06:25 AM    ANION 15.0 03/15/2024 06:25 AM    GLUCOSE 125 (H) 03/15/2024 06:25 AM    BUN 14 03/15/2024 06:25 AM    CREATININE 0.61 03/15/2024 06:25 AM    CREATININE 0.77 07/20/2010 08:50 AM    CALCIUM 9.6 03/15/2024 06:25 AM    ASTSGOT 15 02/06/2024 08:29 AM    ALTSGPT 12 02/06/2024 08:29 AM    TBILIRUBIN 0.4 02/06/2024 08:29 AM    ALBUMIN 4.2 02/06/2024 08:29 AM    TOTPROTEIN 7.6 02/06/2024 08:29 AM    GLOBULIN 3.4 02/06/2024 08:29 AM    AGRATIO 1.2 02/06/2024 08:29 AM       Lab Results   Component Value Date/Time    CHOLSTRLTOT 135 05/05/2022 0653    TRIGLYCERIDE 128 05/05/2022 0653    HDL 50 05/05/2022 0653    LDL 59 05/05/2022 0653       Lab Results   Component Value Date/Time    MALBCRT 950 (H) 03/15/2024 06:25 AM    MICROALBUR 37.2 03/15/2024 06:25 AM        Lab Results   Component Value Date/Time    TSHULTRASEN 4.130 08/10/2022 0631     No results found for: FREEDIR  No results found for: FREET3  No results found for: THYSTIMIG        ASSESSMENT/PLAN:     1. Type 2 diabetes mellitus with hyperglycemia, with long-term current use of insulin (HCC)  Uncontrolled secondary to hyperglycemia and noncompliance with diet  CHM was downloaded and reviewed   Increase Toujeo to 55u daily  Increase Lyumjev to 8u QAC  Continue metformin  Continue glimepiride  Continue Trulicity  Recommend that she follow-up with our diabetes nurse Lisa in 3 months    2. Diabetic nephropathy associated with diabetes mellitus due to underlying condition (HCC)  Uncontrolled secondary to uncontrolled diabetes  She cannot tolerate an SGLT2 inhibitor  Persistent albuminuria despite taking ARB  We discussed Kerendia but she doesn't take it   Recommend monitoring  urine albumin    3. Dyslipidemia  Stable continue Crestor  Repeat fasting lipids in 12 months    4. Essential hypertension  Stable  Continue blood pressure medications  Repeat urine albumin in 3 months    5.  Vitamin D deficiency  Stable   Vitamin D labs were reviewed with patient  Continue current supplements  Continue monitoring levels       6. Long-term insulin use (HCC)  Patient is on long-term basal insulin therapy plus other oral agents and a GLP-1 for type 2 diabetes management      7. Binge eating  Recommend she see psychiatry for binge eating or stress eating         No follow-ups on file.      Total time spent on day of service was over 60 minutes which included obtaining a detailed history and physical exam, ordering labs, coordinating care and scheduling future follow-up    Thank you kindly for allowing me to participate in the diabetes care plan for this patient.    Trent Timmons MD, FACE, NU      CC:   Carrington Gonzalez D.O.

## 2024-03-21 NOTE — TELEPHONE ENCOUNTER
Received request via: Pharmacy    Was the patient seen in the last year in this department? Yes    Does the patient have an active prescription (recently filled or refills available) for medication(s) requested? No    [facility]    Does the patient have senior living Plus and need 100 day supply (blood pressure, diabetes and cholesterol meds only)? Patient does not have SCP

## 2024-03-23 ENCOUNTER — PATIENT MESSAGE (OUTPATIENT)
Dept: ENDOCRINOLOGY | Facility: MEDICAL CENTER | Age: 69
End: 2024-03-23
Payer: COMMERCIAL

## 2024-03-25 ENCOUNTER — TELEPHONE (OUTPATIENT)
Dept: ENDOCRINOLOGY | Facility: MEDICAL CENTER | Age: 69
End: 2024-03-25
Payer: COMMERCIAL

## 2024-03-25 NOTE — TELEPHONE ENCOUNTER
Called patient at [phone] and advised her of the information obtained by Edith at [facility]. Patient was thankful for the assistance.    Thank you,  Lakisha Davenport, [facility]  Endocrinology Pharmacy Coordinator

## 2024-04-07 DIAGNOSIS — R60.0 LOCALIZED EDEMA: ICD-10-CM

## 2024-04-09 RX ORDER — FUROSEMIDE 20 MG/1
20 TABLET ORAL DAILY
Qty: 90 TABLET | Refills: 1 | Status: SHIPPED | OUTPATIENT
Start: 2024-04-09

## 2024-05-14 ENCOUNTER — HOSPITAL ENCOUNTER (OUTPATIENT)
Dept: RADIOLOGY | Facility: MEDICAL CENTER | Age: 69
End: 2024-05-14
Attending: PHYSICIAN ASSISTANT
Payer: COMMERCIAL

## 2024-05-14 DIAGNOSIS — N20.0 CALCULUS OF KIDNEY: ICD-10-CM

## 2024-07-03 DIAGNOSIS — Z79.4 TYPE 2 DIABETES MELLITUS WITH HYPERGLYCEMIA, WITH LONG-TERM CURRENT USE OF INSULIN (HCC): ICD-10-CM

## 2024-07-03 DIAGNOSIS — E11.65 TYPE 2 DIABETES MELLITUS WITH HYPERGLYCEMIA, WITH LONG-TERM CURRENT USE OF INSULIN (HCC): ICD-10-CM

## 2024-07-06 RX ORDER — DULAGLUTIDE 4.5 MG/.5ML
INJECTION, SOLUTION SUBCUTANEOUS
Qty: 6 ML | Refills: 3 | Status: SHIPPED | OUTPATIENT
Start: 2024-07-06

## 2024-08-19 ENCOUNTER — APPOINTMENT (OUTPATIENT)
Dept: MEDICAL GROUP | Facility: PHYSICIAN GROUP | Age: 69
End: 2024-08-19
Payer: MEDICARE

## 2024-08-19 VITALS
BODY MASS INDEX: 36.68 KG/M2 | RESPIRATION RATE: 16 BRPM | HEART RATE: 78 BPM | WEIGHT: 207 LBS | TEMPERATURE: 97.8 F | OXYGEN SATURATION: 96 % | DIASTOLIC BLOOD PRESSURE: 66 MMHG | HEIGHT: 63 IN | SYSTOLIC BLOOD PRESSURE: 126 MMHG

## 2024-08-19 DIAGNOSIS — R80.9 MICROALBUMINURIA DUE TO TYPE 2 DIABETES MELLITUS (HCC): ICD-10-CM

## 2024-08-19 DIAGNOSIS — E11.29 MICROALBUMINURIA DUE TO TYPE 2 DIABETES MELLITUS (HCC): ICD-10-CM

## 2024-08-19 DIAGNOSIS — M05.741 RHEUMATOID ARTHRITIS WITH RHEUMATOID FACTOR OF RIGHT HAND WITHOUT ORGAN OR SYSTEMS INVOLVEMENT (HCC): ICD-10-CM

## 2024-08-19 PROCEDURE — 3078F DIAST BP <80 MM HG: CPT | Performed by: FAMILY MEDICINE

## 2024-08-19 PROCEDURE — 99213 OFFICE O/P EST LOW 20 MIN: CPT | Performed by: FAMILY MEDICINE

## 2024-08-19 PROCEDURE — 3074F SYST BP LT 130 MM HG: CPT | Performed by: FAMILY MEDICINE

## 2024-08-19 ASSESSMENT — FIBROSIS 4 INDEX: FIB4 SCORE: 0.77

## 2024-08-19 NOTE — PROGRESS NOTES
[facility] Follow-up Visit    Date of Visit: 8/21/2024     CC:  Follow-up for JASPER management      HPI:  Zena Hui is a very pleasant 67 y.o. year old female never smoker, with a PMHx of JASPER on CPAP, elevated BMI, DSL, T2DM, HTN who presented to the [facility] for a regular follow up. Last seen in the office on 2/21/2024 with myself.     Patient presents for compliance.      Sleep more restful with CPAP use    Denies morning headaches.    Denies daytime drowsiness / driving drowsy.     Denies any issues falling asleep.      Snoring / Gasping / Apneas    Palpitations    Dry mouth    Denies any symptoms of RLS, narcolepsy or any nightmares, sleep walking or acting out of dreams.    Aerophagia    Mask leak / Skin irritation      Goes to bed:  Wakes up:  Naps (frequency and duration):  Awakenings:  Issues falling back to sleep?      DME provider:  [employer]  Device:  [employer] AirSense 10  Settings:  AutoCPAP 5-12 CWP  When:  March 2021  Mask:   FFM  Chin strap: No     Cleaning regimen: Once a week with dish soap and water    Compliance:  Compliance data reviewed showing ***% usage > 4hours in last 30 *** days. Average AHI *** events/hour. 95% pressure *** CWP. 95% leaks *** L/min. Patient continues to use and benefit from machine. ***          Sleep History:  S 7/30/2020-      Patient Active Problem List    Diagnosis Date Noted    JASPER on CPAP 06/17/2009    Insomnia 02/21/2024    Binge eating 03/21/2024    Murmur 02/06/2024    Mild peripheral edema 02/06/2024    Rheumatoid arthritis with rheumatoid factor of right hand without organ or systems involvement (HCC) 02/06/2024    Dyslipidemia associated with type 2 diabetes mellitus (HCC) 01/23/2024    Anxiety with flying 12/06/2023    Plantar fasciitis of left foot 10/04/2023    Microalbuminuria due to type 2 diabetes mellitus (HCC) 08/11/2023    Rheumatoid factor positive 08/11/2023    Seasonal allergic rhinitis due to pollen 06/19/2023    Diabetic  nephropathy associated with diabetes mellitus due to underlying condition ([facility]) 05/18/2023    Other constipation 05/16/2023    Arthralgia 05/16/2023    BMI 37.0-37.9, adult 08/01/2022    Long-term insulin use ([facility]) 05/09/2022    Severe obesity (BMI 35.0-39.9) with comorbidity ([facility]) 11/03/2021    Type 2 diabetes mellitus with hyperglycemia, with long-term current use of insulin ([facility]) 01/12/2012    Vitamin d deficiency 03/11/2010    Essential hypertension 06/17/2009    Osteopenia 06/17/2009     Past Medical History:   Diagnosis Date    Arthritis 2021    fingers    Blood clotting disorder ([facility])     in spinal cord    Bronchitis     Essential hypertension, benign     High cholesterol     Hyperlipidemia     Mixed hyperlipidemia     Organic sleep apnea, unspecified     Osteopenia     Renal disorder 04/2019    kidney stones    Sleep apnea     Type 2 diabetes mellitus with microalbuminuria, with long-term current use of insulin ([facility]) 02/28/2023    Type II or unspecified type diabetes mellitus without mention of complication, not stated as uncontrolled     oral meds      Past Surgical History:   Procedure Laterality Date    ND CYSTOSCOPY,INSERT URETERAL STENT Bilateral 07/05/2021    Procedure: CYSTOSCOPY, WITH URETERAL STENT INSERTION - POSSIBLE STENT;  Surgeon: Italo Fair M.D.;  Location: [facility];  Service: Urology    ND CYSTO/URETERO/PYELOSCOPY, DX Bilateral 07/05/2021    Procedure: URETEROSCOPY;  Surgeon: Italo Fair M.D.;  Location: [facility];  Service: Urology    LASERTRIPSY Bilateral 07/05/2021    Procedure: LITHOTRIPSY, USING LASER.;  Surgeon: Italo Fair M.D.;  Location: [facility];  Service: Urology    CYSTOSCOPY N/A 07/05/2019    Procedure: CYSTOSCOPY;  Surgeon: Espinoza Orr M.D.;  Location: [facility];  Service: Urology    URETEROSCOPY Right 07/05/2019    Procedure: URETEROSCOPY;  Surgeon: Espinoza Orr M.D.;  Location: [facility];   Service: Urology    LASERTRIPSY Right 2019    Procedure: LITHOTRIPSY, USING LASER;  Surgeon: Espinoza Orr M.D.;  Location: [facility] [place];  Service: Urology    STENT PLACEMENT Right 2019    Procedure: STENT PLACEMENT;  Surgeon: Espinoza Orr M.D.;  Location: SURGERY [facility] [place];  Service: Urology    OTHER  2019    lithotripsy    COLONOSCOPY WITH POLYP  10/2007    tubular adenoma    OTHER ORTHOPEDIC SURGERY N/A 1998    laminectomy    EMBOLECTOMY      remove blood clot from spinal cord    LAMINOTOMY       Family History   Problem Relation Age of Onset    Arthritis Mother     Lung Disease Mother         COPD    Hypertension Mother     Hyperlipidemia Mother     Heart Disease Father          @ 41 from heart attack    Hyperlipidemia Father     Heart Disease Brother         CAD   S/P CABG    Cancer Maternal Grandmother         Pancreatic cancer    Cancer Brother         Prostate cancer     Social History     Socioeconomic History    Marital status:      Spouse name: Not on file    Number of children: Not on file    Years of education: Not on file    Highest education level: Associate degree: academic program   Occupational History    Not on file   Tobacco Use    Smoking status: Never    Smokeless tobacco: Never   Vaping Use    Vaping status: Never Used   Substance and Sexual Activity    Alcohol use: Never    Drug use: Never    Sexual activity: Not Currently     Partners: Male     Birth control/protection: Post-Menopausal   Other Topics Concern    Not on file   Social History Narrative    Not on file     Social Determinants of Health     Financial Resource Strain: Low Risk  (2024)    Overall Financial Resource Strain (CARDIA)     Difficulty of Paying Living Expenses: Not hard at all   Food Insecurity: No Food Insecurity (2024)    Hunger Vital Sign     Worried About Running Out of Food in the Last Year: Never true     Ran Out of Food in the Last Year: Never  true   Transportation Needs: No Transportation Needs (1/23/2024)    PRAPARE - Transportation     Lack of Transportation (Medical): No     Lack of Transportation (Non-Medical): No   Physical Activity: Insufficiently Active (12/11/2022)    Exercise Vital Sign     Days of Exercise per Week: 2 days     Minutes of Exercise per Session: 30 min   Stress: No Stress Concern Present (12/11/2022)    [place] [employer] - Occupational Stress Questionnaire     Feeling of Stress : Not at all   Social Connections: Moderately Isolated (12/11/2022)    Social Connection and Isolation Panel [NHANES]     Frequency of Communication with Friends and Family: More than three times a week     Frequency of Social Gatherings with Friends and Family: Once a week     Attends Yarsanism Services: Never     Active Member of Clubs or Organizations: No     Attends Club or Organization Meetings: Never     Marital Status:    Intimate Partner Violence: Not on file   Housing Stability: Low Risk  (1/23/2024)    Housing Stability Vital Sign     Unable to Pay for Housing in the Last Year: No     Number of Places Lived in the Last Year: 1     Unstable Housing in the Last Year: No     Current Outpatient Medications   Medication Sig Dispense Refill    TRULICITY 4.5 MG/0.5ML Solution Pen-injector INJECT 4.5MG UNDER SKIN ONCE WEEKLY 6 mL 3    furosemide (LASIX) 20 MG Tab TAKE 1 TABLET BY MOUTH EVERY DAY 90 Tablet 1    rosuvastatin (CRESTOR) 20 MG Tab TAKE 1 TABLET BY MOUTH EVERY DAY 30 Tablet 13    TOUJEO SOLOSTAR 300 UNIT/ML Solution Pen-injector INJECT 50 UNITS UNDER THE SKIN  DAILY 13.5 mL 3    glimepiride (AMARYL) 4 MG Tab TAKE 1 TABLET BY MOUTH TWICE A  Tablet 4    amlodipine-valsartan (EXFORGE)  MG per tablet TAKE 1 TABLET BY MOUTH EVERY DAY 90 Tablet 3    Insulin Lispro-aabc, 1 U Dial, (LYUMJEV KWIKPEN) 100 UNIT/ML Solution Pen-injector Inject 4 Units under the skin 3 times a day before meals. 5 pens 15 mL 11     Continuous Blood Gluc Sensor (FREESTYLE ETELVINA 2 SENSOR) Misc USE 1 EACH EVERY 14 DAYS. 6 Each 3    metformin (GLUCOPHAGE) 1000 MG tablet TAKE 1 TABLET BY MOUTH TWICE A DAY WITH MEALS 180 Tablet 2    B Complex Vitamins (VITAMIN B COMPLEX PO) Take  by mouth. Once daily      VITAMIN D PO Take  by mouth. Once dialy      Multiple Vitamin (MULTIVITAMIN ADULT PO) Take  by mouth.      aspirin 81 MG tablet Take 81 mg by mouth every day.       No current facility-administered medications for this visit.      ALLERGIES: Amoxicillin    ROS:  Constitutional: Denies fever, chills, sweats,  weight loss, fatigue  Cardiovascular: Denies chest pain, tightness, palpitations, swelling in legs/feet  Respiratory: Denies shortness of breath, cough, sputum, wheezing, painful breathing   Sleep: per HPI  Gastrointestinal: Denies  difficulty swallowing, nausea, abdominal pain, diarrhea, constipation, heartburn.  Musculoskeletal: Denies painful joints, sore muscles,       PHYSICAL EXAM:  [facility] 01/01/2009   Appearance: Well-nourished, well-developed, no acute distress  Eyes:  No scleral icterus , EOMI  ENMT: No redness of the oropharynx  Lung auscultation:  No wheezes rhonchi rubs or rales  Cardiac: No murmurs, rubs, or gallops; regular rhythm, normal rate; no edema  Musculoskeletal:  Grossly normal; gait and station normal; digits and nails normal  Skin:  No rashes, petechiae, cyanosis  Neurologic: without focal signs; oriented to person, time, place, and purpose; judgement intact  Psychiatric:  No depression, anxiety, agitation  Mallampati score: Class III    Assessment and Plan:    The medical record was reviewed.    Diagnostic and titration nocturnal polysomnogram's, home sleep apnea tests, continuous nocturnal oximetry results, multiple sleep latency tests, and compliance reports reviewed.    Problem List Items Addressed This Visit    None    Have advised the patient to follow up with the appropriate healthcare practitioners for all other  medical problems and issues.    No follow-ups on file.    Please note portions of this record was created using voice recognition software. I have made every reasonable attempt to correct obvious errors, but I expect that there are errors of grammar and possibly content I did not discover before finalizing the note.    Time spent in record review prior to patient arrival, reviewing results, and in face-to-face encounter totaled 20 min.  __________  GINGER Harris  Pulmonary & Sleep Medicine  [facility]

## 2024-08-19 NOTE — PROGRESS NOTES
Subjective:     CC: Here to discuss a referral to rheumatology.    HPI:   Zena presents today with the following medical concerns:    Rheumatoid arthritis with rheumatoid factor of right hand without organ or systems involvement ([facility])  This is a chronic problem.  Patient states she would now like to try to get into a [facility] as she would like to keep all the care through us.  We had done a referral last year and they wanted some lab and x-rays ordered first.  Those will be done and the referral be made.  Her problem is primarily in her hands.    Microalbuminuria due to type 2 diabetes mellitus ([facility])  This is a chronic problem.  She is seeing Dr. Beyer next week for adjustment of her regimen and ongoing care.    Past Medical History:   Diagnosis Date    Arthritis 2021    fingers    Blood clotting disorder ([facility])     in spinal cord    Bronchitis     Essential hypertension, benign     High cholesterol     Hyperlipidemia     Mixed hyperlipidemia     Organic sleep apnea, unspecified     Osteopenia     Renal disorder 04/2019    kidney stones    Sleep apnea     Type 2 diabetes mellitus with microalbuminuria, with long-term current use of insulin ([facility]) 02/28/2023    Type II or unspecified type diabetes mellitus without mention of complication, not stated as uncontrolled     oral meds       Social History     Tobacco Use    Smoking status: Never    Smokeless tobacco: Never   Vaping Use    Vaping status: Never Used   Substance Use Topics    Alcohol use: Never    Drug use: Never       Current Outpatient Medications Ordered in Epic   Medication Sig Dispense Refill    TRULICITY 4.5 MG/0.5ML Solution Pen-injector INJECT 4.5MG UNDER SKIN ONCE WEEKLY 6 mL 3    furosemide (LASIX) 20 MG Tab TAKE 1 TABLET BY MOUTH EVERY DAY 90 Tablet 1    rosuvastatin (CRESTOR) 20 MG Tab TAKE 1 TABLET BY MOUTH EVERY DAY 30 Tablet 13    TOUJEO SOLOSTAR 300 UNIT/ML Solution Pen-injector INJECT 50 UNITS UNDER THE SKIN  DAILY 13.5 mL 3  "   glimepiride (AMARYL) 4 MG Tab TAKE 1 TABLET BY MOUTH TWICE A  Tablet 4    amlodipine-valsartan (EXFORGE)  MG per tablet TAKE 1 TABLET BY MOUTH EVERY DAY 90 Tablet 3    Insulin Lispro-aabc, 1 U Dial, (LYUMJEV KWIKPEN) 100 UNIT/ML Solution Pen-injector Inject 4 Units under the skin 3 times a day before meals. 5 pens 15 mL 11    Continuous Blood Gluc Sensor (FREESTYLE ETELVINA 2 SENSOR) Misc USE 1 EACH EVERY 14 DAYS. 6 Each 3    metformin (GLUCOPHAGE) 1000 MG tablet TAKE 1 TABLET BY MOUTH TWICE A DAY WITH MEALS 180 Tablet 2    B Complex Vitamins (VITAMIN B COMPLEX PO) Take  by mouth. Once daily      VITAMIN D PO Take  by mouth. Once dialy      Multiple Vitamin (MULTIVITAMIN ADULT PO) Take  by mouth.      aspirin 81 MG tablet Take 81 mg by mouth every day.       No current [facility]-ordered facility-administered medications on file.       Allergies:  Amoxicillin    Health Maintenance: Completed    ROS:  Gen: no fevers/chills, no changes in weight  Eyes: no changes in vision  ENT: no sore throat, no hearing loss, no bloody nose  Pulm: no sob, no cough  CV: no chest pain, no palpitations  GI: no nausea/vomiting, no diarrhea  : no dysuria  Skin: no rash  Neuro: no headaches, no numbness/tingling  Heme/Lymph: no easy bruising      Objective:       Exam:  /66 (BP Location: Right arm, Patient Position: Sitting, BP Cuff Size: Adult)   Pulse 78   Temp 36.6 °C (97.8 °F) (Temporal)   Resp 16   Ht 1.6 m (5' 3\")   Wt 93.9 kg (207 lb)   LMP 01/01/2009   SpO2 96%   BMI 36.67 kg/m²  Body mass index is 36.67 kg/m².    Gen: Alert and oriented, No apparent distress.  Neck: Neck is supple without lymphadenopathy.  There is a small firm normal-sized lymph node to the left anterior cervical chain.  (She had had recent dental work on the left side of her mouth)  Lungs: Normal effort,   Ext: No clubbing, cyanosis.  She does have some swelling to the fingers on both hands.  Also discomfort with range of motion of the " left thumb.      Labs: Ordered    Assessment & Plan:     69 y.o. female with the following -     1. Rheumatoid arthritis with rheumatoid factor of right hand without organ or systems involvement (HCC)  This is a chronic problem.  X-rays of her hands ordered to assess that.  Dominant place that is bothering her with her RA.  Labs also ordered that were requested.  Referral to rheumatology is asked at [facility].  - DX-HAND 2- LEFT; Future  - DX-HAND 2- RIGHT; Future  - Sed Rate; Future  - CRP QUANTITIVE (NON-CARDIAC); Future  - CCP ANTIBODIES, IGG/IGA; Future  - Referral to Rheumatology    2. Microalbuminuria due to type 2 diabetes mellitus (HCC)  This is a chronic problem.  Continue care and adjustment of medication regimen through Dr. Beyer.      Return in about 6 months (around 2/19/2025) for Long.    Please note that this dictation was created using voice recognition software. I have made every reasonable attempt to correct obvious errors, but I expect that there are errors of grammar and possibly content that I did not discover before finalizing the note.

## 2024-08-19 NOTE — ASSESSMENT & PLAN NOTE
This is a chronic problem.  She is seeing Dr. Beyer next week for adjustment of her regimen and ongoing care.

## 2024-08-19 NOTE — ASSESSMENT & PLAN NOTE
This is a chronic problem.  Patient states she would now like to try to get into a [facility] as she would like to keep all the care through us.  We had done a referral last year and they wanted some lab and x-rays ordered first.  Those will be done and the referral be made.  Her problem is primarily in her hands.

## 2024-08-20 DIAGNOSIS — E11.65 TYPE 2 DIABETES MELLITUS WITH HYPERGLYCEMIA, WITH LONG-TERM CURRENT USE OF INSULIN (HCC): ICD-10-CM

## 2024-08-20 DIAGNOSIS — Z79.4 TYPE 2 DIABETES MELLITUS WITH HYPERGLYCEMIA, WITH LONG-TERM CURRENT USE OF INSULIN (HCC): ICD-10-CM

## 2024-08-21 ENCOUNTER — APPOINTMENT (OUTPATIENT)
Dept: SLEEP MEDICINE | Facility: MEDICAL CENTER | Age: 69
End: 2024-08-21
Payer: MEDICARE

## 2024-08-22 ENCOUNTER — HOSPITAL ENCOUNTER (OUTPATIENT)
Dept: RADIOLOGY | Facility: MEDICAL CENTER | Age: 69
End: 2024-08-22
Attending: FAMILY MEDICINE
Payer: MEDICARE

## 2024-08-22 ENCOUNTER — HOSPITAL ENCOUNTER (OUTPATIENT)
Dept: LAB | Facility: MEDICAL CENTER | Age: 69
End: 2024-08-22
Attending: FAMILY MEDICINE
Payer: MEDICARE

## 2024-08-22 ENCOUNTER — HOSPITAL ENCOUNTER (OUTPATIENT)
Facility: MEDICAL CENTER | Age: 69
End: 2024-08-22
Attending: INTERNAL MEDICINE
Payer: MEDICARE

## 2024-08-22 DIAGNOSIS — M05.741 RHEUMATOID ARTHRITIS WITH RHEUMATOID FACTOR OF RIGHT HAND WITHOUT ORGAN OR SYSTEMS INVOLVEMENT (HCC): ICD-10-CM

## 2024-08-22 DIAGNOSIS — E11.65 TYPE 2 DIABETES MELLITUS WITH HYPERGLYCEMIA, WITH LONG-TERM CURRENT USE OF INSULIN (HCC): ICD-10-CM

## 2024-08-22 DIAGNOSIS — Z79.4 TYPE 2 DIABETES MELLITUS WITH HYPERGLYCEMIA, WITH LONG-TERM CURRENT USE OF INSULIN (HCC): ICD-10-CM

## 2024-08-22 LAB
CREAT UR-MCNC: 48.26 MG/DL
CRP SERPL HS-MCNC: 0.32 MG/DL (ref 0–0.75)
ERYTHROCYTE [SEDIMENTATION RATE] IN BLOOD BY WESTERGREN METHOD: 7 MM/HOUR (ref 0–25)
MICROALBUMIN UR-MCNC: 47.7 MG/DL
MICROALBUMIN/CREAT UR: 988 MG/G (ref 0–30)

## 2024-08-22 PROCEDURE — 85652 RBC SED RATE AUTOMATED: CPT

## 2024-08-22 PROCEDURE — 82043 UR ALBUMIN QUANTITATIVE: CPT

## 2024-08-22 PROCEDURE — 86200 CCP ANTIBODY: CPT

## 2024-08-22 PROCEDURE — 82570 ASSAY OF URINE CREATININE: CPT

## 2024-08-22 PROCEDURE — 73120 X-RAY EXAM OF HAND: CPT | Mod: RT

## 2024-08-22 PROCEDURE — 73120 X-RAY EXAM OF HAND: CPT | Mod: LT

## 2024-08-22 PROCEDURE — 36415 COLL VENOUS BLD VENIPUNCTURE: CPT

## 2024-08-22 PROCEDURE — 86140 C-REACTIVE PROTEIN: CPT

## 2024-08-24 LAB — CCP IGA+IGG SERPL IA-ACNC: 3 UNITS (ref 0–19)

## 2024-08-27 ENCOUNTER — NON-PROVIDER VISIT (OUTPATIENT)
Dept: ENDOCRINOLOGY | Facility: MEDICAL CENTER | Age: 69
End: 2024-08-27
Attending: INTERNAL MEDICINE
Payer: MEDICARE

## 2024-08-27 ENCOUNTER — PHARMACY VISIT (OUTPATIENT)
Dept: PHARMACY | Facility: MEDICAL CENTER | Age: 69
End: 2024-08-27
Payer: COMMERCIAL

## 2024-08-27 VITALS
HEIGHT: 63 IN | BODY MASS INDEX: 37.03 KG/M2 | SYSTOLIC BLOOD PRESSURE: 128 MMHG | DIASTOLIC BLOOD PRESSURE: 64 MMHG | HEART RATE: 84 BPM | OXYGEN SATURATION: 97 % | WEIGHT: 209 LBS

## 2024-08-27 DIAGNOSIS — E11.65 TYPE 2 DIABETES MELLITUS WITH HYPERGLYCEMIA, WITH LONG-TERM CURRENT USE OF INSULIN (HCC): ICD-10-CM

## 2024-08-27 DIAGNOSIS — Z79.4 TYPE 2 DIABETES MELLITUS WITH HYPERGLYCEMIA, WITH LONG-TERM CURRENT USE OF INSULIN (HCC): ICD-10-CM

## 2024-08-27 LAB
HBA1C MFR BLD: 8.9 % (ref ?–5.8)
POCT INT CON NEG: NEGATIVE
POCT INT CON POS: POSITIVE

## 2024-08-27 PROCEDURE — 95249 CONT GLUC MNTR PT PROV EQP: CPT | Performed by: INTERNAL MEDICINE

## 2024-08-27 PROCEDURE — RXMED WILLOW AMBULATORY MEDICATION CHARGE: Performed by: INTERNAL MEDICINE

## 2024-08-27 PROCEDURE — 99212 OFFICE O/P EST SF 10 MIN: CPT | Performed by: INTERNAL MEDICINE

## 2024-08-27 PROCEDURE — 83036 HEMOGLOBIN GLYCOSYLATED A1C: CPT

## 2024-08-27 RX ORDER — ROSUVASTATIN CALCIUM 20 MG/1
20 TABLET, COATED ORAL DAILY
Qty: 100 TABLET | Refills: 0 | OUTPATIENT
Start: 2024-08-27

## 2024-08-27 RX ORDER — SEMAGLUTIDE 0.68 MG/ML
0.5 INJECTION, SOLUTION SUBCUTANEOUS
Qty: 3 ML | Refills: 0 | Status: SHIPPED | OUTPATIENT
Start: 2024-08-27

## 2024-08-27 RX ORDER — AMLODIPINE AND VALSARTAN 10; 320 MG/1; MG/1
1 TABLET ORAL DAILY
Qty: 100 TABLET | Refills: 0 | Status: CANCELLED | OUTPATIENT
Start: 2024-08-27

## 2024-08-27 RX ORDER — SEMAGLUTIDE 1.34 MG/ML
1 INJECTION, SOLUTION SUBCUTANEOUS
Qty: 9 ML | Refills: 3 | Status: SHIPPED | OUTPATIENT
Start: 2024-08-27

## 2024-08-27 ASSESSMENT — FIBROSIS 4 INDEX: FIB4 SCORE: 0.77

## 2024-08-27 NOTE — PROGRESS NOTES
RN-CDE Note    Subjective:   Endocrinology Clinic Progress Note  PCP: Link Polanco III, M.D.    HPI:  Zena Hui is a 69 y.o. old patient who is seen today by the Diabetes Nurse Specialist for review of Type 2 Diabetes.  Recent changes in health: She has been helping her daughter get ready to move to [place].  She has been eating out a lot.  DM:   Last A1c:   Lab Results   Component Value Date/Time    HBA1C 8.9 (A) 08/27/2024 08:32 AM      Previous A1c was 8.4 on 3/15/24  A1C GOAL: < 7    Diabetes Medications:   Toujeo 50 units daily  Lyumjev 8 units TID  Metformin 1000 mg BID  Glimepiride 4 mg BID  Trulicity 4.5 mg weekly    Exercise: walking around the [place] and going to the gym with .  Diet: Has been eating out a lot.  Breakfast is eggs, fruit and vegetables.  She likes cream of wheat.  Patient's body mass index is 37.02 kg/m². Exercise and nutrition counseling were performed at this visit.    Glucose monitoring frequency: See Christina download    Hypoglycemic episodes: no  Last Retinal Exam: on file and up-to-date  Daily Foot Exam: Yes   Foot Exam:  Monofilament: done  Monofilament testing with a 10 gram force: sensation intact: intact bilaterally  Visual Inspection: Feet without maceration, ulcers, fissures.  Pedal pulses: intact bilaterally   Lab Results   Component Value Date/Time    MALBCRT 988 (H) 08/22/2024 07:40 AM    MICROALBUR 47.7 08/22/2024 07:40 AM        ACR Albumin/Creatinine Ratio goal <30     HTN:   Blood pressure goal <130/<80 .   Currently Rx ACE/ARB: Yes     Dyslipidemia:    Lab Results   Component Value Date/Time    CHOLSTRLTOT 146 03/15/2024 06:25 AM    LDL 63 03/15/2024 06:25 AM    HDL 60 03/15/2024 06:25 AM    TRIGLYCERIDE 114 03/15/2024 06:25 AM         Currently Rx Statin: Yes     She  reports that she has never smoked. She has never used smokeless tobacco.      Plan:     Discussed and educated on:   - All medications, side effects and compliance (discussed carefully)  -  Annual eye examinations at Ophthalmology  - Home glucose monitoring emphasized  - Weight control and daily exercise    Recommended medication changes: Continue Toujeo 50 units daily, Metformin 1000 mg BID, and Glimepiride 4 mg BID.  For her Lyumjev she will use a 1:5 carbohydrate plus 1:30>150 correction before her meals.  She will stop Trulicity 4.5 mg and start Ozempic.  She will start with .5 mg weekly for 4 weeks and then increase to 1 mg weekly.  A prescription for a sample pen was sent to the [facility].  She will follow up with me in 3 months and Dr. Timmons in 6 months.  She will consider going on Kerendia if her urine micro alb/creat ratio stays high.  She is in the doughnut hole and her medications are expensive right now.

## 2024-08-27 NOTE — TELEPHONE ENCOUNTER
Received request via: Pharmacy    Was the patient seen in the last year in this department? Yes    Does the patient have an active prescription (recently filled or refills available) for medication(s) requested? Yes.     Pharmacy Name: [facility]    Does the patient have USP Plus and need 100-day supply? (This applies to ALL medications) Yes, quantity updated to 100 days

## 2024-08-27 NOTE — TELEPHONE ENCOUNTER
Received request via: Pharmacy    Was the patient seen in the last year in this department? Yes    Does the patient have an active prescription (recently filled or refills available) for medication(s) requested? Yes.     Pharmacy Name[facility]    Does the patient have alf Plus and need 100-day supply? (This applies to ALL medications) Yes, quantity updated to 100 days

## 2024-09-09 DIAGNOSIS — E11.69 DYSLIPIDEMIA ASSOCIATED WITH TYPE 2 DIABETES MELLITUS (HCC): ICD-10-CM

## 2024-09-09 DIAGNOSIS — E78.5 DYSLIPIDEMIA ASSOCIATED WITH TYPE 2 DIABETES MELLITUS (HCC): ICD-10-CM

## 2024-09-09 RX ORDER — ROSUVASTATIN CALCIUM 20 MG/1
20 TABLET, COATED ORAL DAILY
Qty: 100 TABLET | Refills: 1 | Status: SHIPPED | OUTPATIENT
Start: 2024-09-09

## 2024-09-09 ASSESSMENT — RHEUMATOLOGY NEW PATIENT QUESTIONNAIRE
MARK ALL THE AREAS OF PAIN: 106708933
DRY MOUTH: Y
JOINT SWELLING: Y
PRESCRIPTION, OVER THE COUNTER, OR HERBAL MEDICATIONS: SEE LIST
TENDON PAIN: Y
DRY EYES: Y
JOINT PAIN: SAME WITH ACTIVITY
RATE_1TO10: 5
LYMPH NODE SWELLING: NECK
DURATION: <30 MINS
CHARACTERISTIC: SAME WITH ACTIVITY

## 2024-09-10 ENCOUNTER — HOSPITAL ENCOUNTER (OUTPATIENT)
Dept: LAB | Facility: MEDICAL CENTER | Age: 69
End: 2024-09-10
Attending: STUDENT IN AN ORGANIZED HEALTH CARE EDUCATION/TRAINING PROGRAM
Payer: MEDICARE

## 2024-09-10 ENCOUNTER — OFFICE VISIT (OUTPATIENT)
Dept: RHEUMATOLOGY | Facility: MEDICAL CENTER | Age: 69
End: 2024-09-10
Attending: STUDENT IN AN ORGANIZED HEALTH CARE EDUCATION/TRAINING PROGRAM
Payer: MEDICARE

## 2024-09-10 VITALS
TEMPERATURE: 98.1 F | WEIGHT: 205 LBS | HEART RATE: 82 BPM | HEIGHT: 63 IN | OXYGEN SATURATION: 97 % | BODY MASS INDEX: 36.32 KG/M2 | DIASTOLIC BLOOD PRESSURE: 68 MMHG | SYSTOLIC BLOOD PRESSURE: 122 MMHG

## 2024-09-10 DIAGNOSIS — R76.8 SEROLOGIC AUTOIMMUNITY: Primary | ICD-10-CM

## 2024-09-10 DIAGNOSIS — M25.50 ARTHRALGIA OF MULTIPLE JOINTS: ICD-10-CM

## 2024-09-10 PROBLEM — M05.741 RHEUMATOID ARTHRITIS WITH RHEUMATOID FACTOR OF RIGHT HAND WITHOUT ORGAN OR SYSTEMS INVOLVEMENT (HCC): Status: RESOLVED | Noted: 2024-02-06 | Resolved: 2024-09-10

## 2024-09-10 LAB — RHEUMATOID FACT SER IA-ACNC: 15 IU/ML (ref 0–14)

## 2024-09-10 PROCEDURE — 36415 COLL VENOUS BLD VENIPUNCTURE: CPT

## 2024-09-10 PROCEDURE — 3074F SYST BP LT 130 MM HG: CPT | Performed by: STUDENT IN AN ORGANIZED HEALTH CARE EDUCATION/TRAINING PROGRAM

## 2024-09-10 PROCEDURE — 83516 IMMUNOASSAY NONANTIBODY: CPT

## 2024-09-10 PROCEDURE — 3078F DIAST BP <80 MM HG: CPT | Performed by: STUDENT IN AN ORGANIZED HEALTH CARE EDUCATION/TRAINING PROGRAM

## 2024-09-10 PROCEDURE — 99212 OFFICE O/P EST SF 10 MIN: CPT | Performed by: STUDENT IN AN ORGANIZED HEALTH CARE EDUCATION/TRAINING PROGRAM

## 2024-09-10 PROCEDURE — 86431 RHEUMATOID FACTOR QUANT: CPT

## 2024-09-10 PROCEDURE — 99204 OFFICE O/P NEW MOD 45 MIN: CPT | Performed by: STUDENT IN AN ORGANIZED HEALTH CARE EDUCATION/TRAINING PROGRAM

## 2024-09-10 ASSESSMENT — FIBROSIS 4 INDEX: FIB4 SCORE: 0.77

## 2024-09-10 NOTE — PATIENT INSTRUCTIONS
Thank you for visiting our clinic today.     Summary of your visit:    Follow up TBD.     [facility]  Below are important guidelines to help you navigate your health care needs and assist us in caring for you safely and  effectively. We encourage you to carefully read and understand this information and adhere to them accordingly.    [employer] Messaging and Phone Calls:   Diagnosis and Treatment - For a detailed explanation of your condition and treatment plan from today’s visit, refer  to the visit note on [employer] via the following steps:  o Log in to [employer] and click on “Visits” at the top.  o Scroll down to “Past Visits” under Appointments.  o Click on “View Notes” under the appropriate visit date.   Questions or Concerns - MyChart messaging is for non-urgent matters that do not require immediate attention and  should be brief with no more than two questions or concerns. If you have multiple questions or concerns, we ask that  you schedule an appointment to have them properly addressed.   Response to Messages - [employer] messages are addressed throughout the week depending on clinical availability,  so we ask that you allow up to one week for a response.   Phone Calls and Voicemails - Phone calls and voicemail messages are reserved for time-sensitive matters that  cannot wait to be addressed via [employer]. We ask that you refrain from calling the office multiple times or leaving  multiple voicemails regarding the same issue as doing so may lead to delays in response time.   Urgent Issues - For urgent medical matters or medical emergencies that cannot wait, you are advised to go to your  nearest Urgent Care or Emergency Department for immediate attention.    Laboratory Tests and Imaging Studies:   Future Lab and Imaging Orders - We ask that you get your lab tests and imaging studies done no later than one  week before your follow-up visit unless instructed otherwise.   Results Communication -  You may see some test results marked as “abnormal” that are not necessarily significant  or concerning. If there are significant abnormalities on your test results that warrant further action, you will be notified  via MyChart or phone call, otherwise they will be addressed at your follow-up visit.    Prescriptions and Refill Requests:   General Prescriptions (e.g. prednisone, hydroxychloroquine, leflunomide, methotrexate, etc.) - These are sent  to Retail Pharmacies, so all refill requests of these medications should be directed to your local pharmacy.   Specialty Prescriptions (e.g. Enbrel, Humira, Cosentyx, Xeljanz, etc.) - These are sent to Specialty Pharmacies,  so all refill requests of these medications should be directed to your designated specialty pharmacy.   Infusion Prescriptions (e.g. Remicade, Simponi Aria, Rituxan, Saphnelo, etc.) - These are sent to Outpatient  Infusion Centers, so all scheduling requests of these medications should be directed to your local infusion center.    Medication Risks and Adverse Effects:   Immunosuppressed Status - Steroids and antirheumatic drugs are immunosuppressants, so they increase the risk  of infections and can have side effects on various organ systems in your body, though most of them are uncommon.   Potential Side Effects - Be sure to read the drug package inserts to learn about the potential side effects of your  medications before you start taking them and take them exactly as prescribed unless instructed otherwise.   In Case of Side Effects - If you experience any significant side effects, stop taking the medication immediately and  promptly notify the prescriber. Depending on the severity of the side effects, consider going to an Urgent Care or  Emergency Department for immediate attention.    Immunizations and Health Screening:   Vaccinations - If you are on immunosuppressive therapy, it is important that you are up to date on  age-appropriate  immunizations, particularly shingles and pneumonia vaccines, which you can request from your primary care provider  or from us at your next appointment.   Screening Tests - It is also important that you are up to date on age-appropriate screening tests, such as pap  smear, mammography, and colonoscopy, which you can request from your primary care provider.    Educational and Supportive Resources:   [facility] ([email or web address]-[email or web address]) - Visit our website to learn more about  your condition and other rheumatic diseases, and gain access to many helpful resources for them.   Disposal of Old Medications (www.[place].gov/everyday-takeback-day) - Visit the Drug Enforcement Administration  website to find a nearby location where you can properly dispose of old medications you no longer need.   Disposal of Used [place] (www.safeneedledisposal.org) - Visit the Safe Needle Disposal Organization website to  find a nearby location where you can properly dispose of used needles from your injectable medications.  Revised 6/14/2024

## 2024-09-10 NOTE — PROGRESS NOTES
"    [facility] RHEUMATOLOGY  [address] [place], [place] [address] [address], [address], [place] [address]  Phone: [phone] ? Fax: [phone]  [facility]/Health-Services/Rheumatology    NEW PATIENT VISIT NOTE      DATE OF SERVICE: 09/10/2024         Subjective     REFERRING PRACTITIONER:  Link Polanco III, M.D.  [address] [place] [address]    PATIENT IDENTIFICATION:  Zena Hui  [address] [facility] [place] [address]    YOB: 1955    MEDICAL RECORD NUMBER: [id number]         CHIEF COMPLAINT:   Chief Complaint   Patient presents with    New Patient     Rheumatoid Arthritis       HISTORY OF PRESENT ILLNESS:  Zena Hui is a 69 y.o. female with pertinent history notable for T2DM, hypertension, and HLD amongst other comorbidities, who presents for rheumatologic evaluation of rheumatoid arthritis in the setting of low positive RF.     Patient reports chronic left hand pain that started in 10/2023. Describes \"sharp pinching pain\" along the MCPs that occur randomly and mainly in the evenings but occasionally in the mornings. Pain in the joints were normally triggered by the cold weather but she returns to baseline when the weather is warmer. The recent left hand joint symptoms have lasted since last winter. The left 3rd and 5th digits do not fully flex but this has been for many years. She had a traumatic dislocation of the L 3rd digit about 15-18 years ago, treated with reduction. ~ 10-12 years ago, had steroid injection to the left thumb which was very effective. There is associated swelling along the L MCP joints, less in the R hand which is essentially asymptomatic. Reports remote history of lumbar laminectomy/ embolectomy in 1998 (done due to \"blood clot that paralyzed\" her) so has a dull ache on the lateral aspect of the lower lumbar spine with movement but not bothersome with ambulation. Reports left lower back soreness in the mornings that last 1-2 mins. She has mild chronic dry eyes, not associated with " "gritty sensation and does require eye drops. She has dry mouth without associated nocturnal disturbance, change in taste, dry coughs or dysphagia. Denies Raynaud's phenomenon, malar/photosensitive rashes, stroke, DVT/PE, multiple miscarriages, episodes of red painful photophobic eyes, mucosal ulcerations, non-scarring alopecia, psoriasis, podagra, chest pains or dyspnea on exertion. No family history of autoimmune rheumatic diseases but her mother had arthritis.     Reports other aspects of symptoms and medical history as noted on the questionnaire below or scanned under media tab.    Pertinent treatments: no current analgesics  Pertinent positive labs: 2023, RF (17)  Pertinent negative labs: 2024, anti-CCP, ESR/CRP; 2023, PB  Pertinent imagin/2024 bilateral hand XR: R with findings consistent with OA vs gout which is most prominent at the 1st CMC joint. Surrounding bone erosions are noted which are likely due to subchondral cysts rather than inflammatory or erosive arthropathy. There has been progression of findings at this joint since the prior radiograph. Left with findings consistent with mild to moderate OA.  10/2023 R knee XR:  Moderate OA and small joint effusion.    [facility] [place] Form    2024  3:30 PM PDT - Filed by Patient   Demographic Information   Marital Status:    Occupation: Retired    Highest Level of Education: A.S. degree   MAIN REASON FOR VISIT Pain in  hands   HISTORY OF PRESENT ILLNESS   Date of symptom onset: 10/23   Preceding incident/ailment:    Describe/list your symptoms: stiffness, swelling and pain mostly in left hand/fingers but also swelling in right hands   Exacerbating factors:    Alleviating factors: Cortisone shot in left thumb helped with \"ratchet thumb\" 10-12 yrs ago   Helpful medications:    Ineffective medications:    Severity of pain (scale of 1-10): 5   Personal/emotional stressors:    Yasmany All The Areas Of Pain    REVIEW " "OF SYMPTOMS    General   Fevers    Chills    Night sweats    Malaise    Fatigue    Unintentional weight loss    Musculoskeletal   Joint pain Same with activity   Morning stiffness duration <30 mins   Morning stiffness characteristic Same with activity   Joint swelling Yes   Joint instability    Tendon pain Yes   Muscle pain    Body aches    Dermatologic   Hair loss with bald spots    Hair shedding    Skin thickening    Skin plaques    Sunlight-induced skin rash    Cold-induced color changes (white, purple, red on rewarming)    Neurologic/Psychiatric   Weakness    Spasms    Tingling    Burning    Numbness    Insomnia    Anxiety    Depression    Head/Eyes   Headaches    Temple pain    Dizziness    Dry eyes Yes   Eye pain    Eye redness    Blurry vision    Vision loss    Ears/Nose   Ear pain    Ringing in ears    Vertigo    Hearing loss    Nasal ulcers    Nosebleeds    Sinus pain    Nasal congestion    Snoring    Mouth/Throat   Oral ulcers    Bleeding gums    Dry mouth Yes   Cavities    Sore throat    Sticking in throat    Difficulty speaking    Neck/Lymphatics   Thyroid pain    Thyroid swelling    Lymph node swelling Neck   Cardiac/Respiratory   Chest pain with breathing    Dry cough    Cough with bloody phlegm    Shortness of breath    Fast heartbeats    Irregular heartbeats    Gastrointestinal   Nausea    Vomiting    Difficulty swallowing    Heartburn    Abdominal pain    Bloody stool    Mucus stool    Genitourinary   Pelvic pain    Genital ulcers    Abnormal discharge    Burning urination    Frothy urine    Blood in urine    MEDICAL/PERSONAL HISTORY DETAILS   Current Medical Problems: See chart      Dislocated middle finger on left hand 15 (?) yrs ago       \"ratchet thumb\" on left hand maybe 12 yrs ago, relieved with cortisone shont   Past/Resolved Medical Problems: \"Ratchet thumb\" - cortisone shot   Surgeries/Procedures (include month/year): 4/98    Laminectomy blood to relieve blood clot paralysis (waist down) "   Prescription/Over-The-Counter/Herbal Medications: See list   Medication/Food/Material Allergies (include reactions): none   Immunizations (include month/year) see chart   Alcohol/Tobacco/Recreational Drugs:    Family Medical History (father, mother, siblings only): father - heart attack 41 yrs old          Mother - arthritis, COPD, dementia                brother- prostrate cancer         REVIEW OF SYSTEMS:  Except as noted in the history above, relevant review of systems with emphasis on autoimmune rheumatic diseases was otherwise negative.      ACTIVE PROBLEM LIST:  Patient Active Problem List    Diagnosis Date Noted    Binge eating 03/21/2024    Insomnia 02/21/2024    Murmur 02/06/2024    Mild peripheral edema 02/06/2024    Dyslipidemia associated with type 2 diabetes mellitus ([facility]) 01/23/2024    Anxiety with flying 12/06/2023    Plantar fasciitis of left foot 10/04/2023    Microalbuminuria due to type 2 diabetes mellitus ([facility]) 08/11/2023    Rheumatoid factor positive 08/11/2023    Seasonal allergic rhinitis due to pollen 06/19/2023    Diabetic nephropathy associated with diabetes mellitus due to underlying condition ([facility]) 05/18/2023    Other constipation 05/16/2023    Arthralgia 05/16/2023    BMI 37.0-37.9, adult 08/01/2022    Long-term insulin use ([facility]) 05/09/2022    Severe obesity (BMI 35.0-39.9) with comorbidity ([facility]) 11/03/2021    Type 2 diabetes mellitus with hyperglycemia, with long-term current use of insulin ([facility]) 01/12/2012    Vitamin d deficiency 03/11/2010    Essential hypertension 06/17/2009    Osteopenia 06/17/2009    JASPER on CPAP 06/17/2009       PAST MEDICAL HISTORY:  Past Medical History:   Diagnosis Date    Arthritis 2021    fingers    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    Bronchitis     Essential hypertension, benign     High cholesterol     Hyperlipidemia     Mixed hyperlipidemia     Organic sleep apnea, unspecified     Osteopenia     Renal disorder 04/2019    kidney stones     Sleep apnea     Type 2 diabetes mellitus with microalbuminuria, with long-term current use of insulin ([facility]) 02/28/2023    Type II or unspecified type diabetes mellitus without mention of complication, not stated as uncontrolled     oral meds       PAST SURGICAL HISTORY:  Past Surgical History:   Procedure Laterality Date    ME CYSTOSCOPY,INSERT URETERAL STENT Bilateral 07/05/2021    Procedure: CYSTOSCOPY, WITH URETERAL STENT INSERTION - POSSIBLE STENT;  Surgeon: Italo Fair M.D.;  Location: SURGERY [facility];  Service: Urology    ME CYSTO/URETERO/PYELOSCOPY, DX Bilateral 07/05/2021    Procedure: URETEROSCOPY;  Surgeon: Italo Fair M.D.;  Location: SURGERY [facility];  Service: Urology    LASERTRIPSY Bilateral 07/05/2021    Procedure: LITHOTRIPSY, USING LASER.;  Surgeon: Italo Fair M.D.;  Location: SURGERY [facility];  Service: Urology    CYSTOSCOPY N/A 07/05/2019    Procedure: CYSTOSCOPY;  Surgeon: Espinoza Orr M.D.;  Location: SURGERY [facility];  Service: Urology    URETEROSCOPY Right 07/05/2019    Procedure: URETEROSCOPY;  Surgeon: Espinoza Orr M.D.;  Location: SURGERY [facility];  Service: Urology    LASERTRIPSY Right 07/05/2019    Procedure: LITHOTRIPSY, USING LASER;  Surgeon: Espinoza Orr M.D.;  Location: SURGERY [facility];  Service: Urology    STENT PLACEMENT Right 07/05/2019    Procedure: STENT PLACEMENT;  Surgeon: Espinoza Orr M.D.;  Location: [facility];  Service: Urology    OTHER  07/2019    lithotripsy    COLONOSCOPY WITH POLYP  10/2007    tubular adenoma    OTHER ORTHOPEDIC SURGERY N/A 04/1998    laminectomy    EMBOLECTOMY  1998    remove blood clot from spinal cord    LAMINOTOMY         MEDICATIONS:  Current Outpatient Medications   Medication Sig    rosuvastatin (CRESTOR) 20 MG Tab Take 1 Tablet by mouth every day.    Semaglutide,0.25 or 0.5MG/DOS, (OZEMPIC, 0.25 OR 0.5 MG/DOSE,) 2 MG/3ML Solution Pen-injector Inject 0.5 mg under the  skin every 7 days.    TRULICITY 4.5 MG/0.5ML Solution Pen-injector INJECT 4.5MG UNDER SKIN ONCE WEEKLY    furosemide (LASIX) 20 MG Tab TAKE 1 TABLET BY MOUTH EVERY DAY    TOUJEO SOLOSTAR 300 UNIT/ML Solution Pen-injector INJECT 50 UNITS UNDER THE SKIN  DAILY    glimepiride (AMARYL) 4 MG Tab TAKE 1 TABLET BY MOUTH TWICE A DAY    amlodipine-valsartan (EXFORGE)  MG per tablet TAKE 1 TABLET BY MOUTH EVERY DAY    Insulin Lispro-aabc, 1 U Dial, (LYUMJEV KWIKPEN) 100 UNIT/ML Solution Pen-injector Inject 4 Units under the skin 3 times a day before meals. 5 pens    Continuous Blood Gluc Sensor (FREESTYLE ETELVINA 2 SENSOR) Misc USE 1 EACH EVERY 14 DAYS.    metformin (GLUCOPHAGE) 1000 MG tablet TAKE 1 TABLET BY MOUTH TWICE A DAY WITH MEALS    B Complex Vitamins (VITAMIN B COMPLEX PO) Take  by mouth. Once daily    VITAMIN D PO Take  by mouth. Once dialy    Multiple Vitamin (MULTIVITAMIN ADULT PO) Take  by mouth.    aspirin 81 MG tablet Take 81 mg by mouth every day.    Semaglutide, 1 MG/DOSE, (OZEMPIC, 1 MG/DOSE,) 4 MG/3ML Solution Pen-injector Inject 1 mg under the skin every 7 days.       ALLERGIES:   Allergies   Allergen Reactions    Amoxicillin Vomiting     Per patient happened only 1 time.        IMMUNIZATIONS:   Immunization History   Administered Date(s) Administered    Comirnaty (Covid-19 Vaccine, Mrna, 2448-2052 Formula) 10/04/2023, 03/25/2024    INFLUENZA TIV (IM) 10/12/2011    Influenza (IM) Preservative Free - HISTORICAL DATA 11/03/2012, 09/19/2015    Influenza Seasonal Injectable - Historical Data 10/12/2011, 12/22/2011, 09/12/2013, 11/11/2014, 01/20/2018    Influenza Vaccine Adult HD 09/30/2021, 09/12/2022, 09/19/2023    Influenza Vaccine Pediatric Split - Historical Data 10/23/2007    Influenza Vaccine Quad Inj (Pf) 08/27/2019, 09/08/2020    PFIZER BIVALENT SARS-COV-2 VACCINE (12+) 10/10/2022    PFIZER NUÑEZ CAP SARS-COV-2 VACCINATION (12+) 04/05/2022    PFIZER PURPLE CAP SARS-COV-2 VACCINATION (12+)  01/09/2021, 01/30/2021, 09/15/2021    Pneumococcal Conjugate Vaccine (PCV20) 03/10/2023    Pneumococcal polysaccharide vaccine (PPSV-23) 09/12/2013    RSV AREXVY VACCINE 10/04/2023    Tdap Vaccine 07/23/2007, 02/25/2010, 10/22/2019    Zoster Vaccine Recombinant (RZV) (SHINGRIX) 10/22/2019, 11/29/2019, 03/04/2020       SOCIAL HISTORY:   Social History     Socioeconomic History    Marital status:     Highest education level: Associate degree: academic program   Tobacco Use    Smoking status: Never    Smokeless tobacco: Never   Vaping Use    Vaping status: Never Used   Substance and Sexual Activity    Alcohol use: Never    Drug use: Never    Sexual activity: Not Currently     Partners: Male     Birth control/protection: Post-Menopausal     Social Determinants of Health     Financial Resource Strain: Low Risk  (1/23/2024)    Overall Financial Resource Strain (CARDIA)     Difficulty of Paying Living Expenses: Not hard at all   Food Insecurity: No Food Insecurity (1/23/2024)    Hunger Vital Sign     Worried About Running Out of Food in the Last Year: Never true     Ran Out of Food in the Last Year: Never true   Transportation Needs: No Transportation Needs (1/23/2024)    PRAPARE - Transportation     Lack of Transportation (Medical): No     Lack of Transportation (Non-Medical): No   Physical Activity: Insufficiently Active (12/11/2022)    Exercise Vital Sign     Days of Exercise per Week: 2 days     Minutes of Exercise per Session: 30 min   Stress: No Stress Concern Present (12/11/2022)    [employer] - Occupational Stress Questionnaire     Feeling of Stress : Not at all   Social Connections: Moderately Isolated (12/11/2022)    Social Connection and Isolation Panel [NHANES]     Frequency of Communication with Friends and Family: More than three times a week     Frequency of Social Gatherings with Friends and Family: Once a week     Attends Sabianism Services: Never     Active Member of  "Clubs or Organizations: No     Attends Club or Organization Meetings: Never     Marital Status:    Housing Stability: Low Risk  (2024)    Housing Stability Vital Sign     Unable to Pay for Housing in the Last Year: No     Number of Places Lived in the Last Year: 1     Unstable Housing in the Last Year: No       FAMILY HISTORY:  Family History   Problem Relation Age of Onset    Arthritis Mother     Lung Disease Mother         COPD    Hypertension Mother     Hyperlipidemia Mother     Heart Disease Father          @ 41 from heart attack    Hyperlipidemia Father     Heart Disease Brother         CAD   S/P CABG    Cancer Maternal Grandmother         Pancreatic cancer    Cancer Brother         Prostate cancer            Objective     Vital Signs: /68 (BP Location: Left arm, Patient Position: Sitting, BP Cuff Size: Adult)   Pulse 82   Temp 36.7 °C (98.1 °F) (Temporal)   Ht 1.6 m (5' 3\")   Wt 93 kg (205 lb)   SpO2 97% Body mass index is 36.31 kg/m².    General: Appears well and comfortable  Eyes: No scleral or conjunctival lesions  ENT: No apparent oral or nasal lesions  Head/Neck: No apparent scalp or neck lesions  Cardiovascular: Regular rate and rhythm; no pericardial rubs  Respiratory: Breathing quiet and unlabored; no rales or pleural rubs  Gastrointestinal: No apparent organomegaly or abdominal masses  Integumentary: No significant cutaneous lesions or dyspigmentation  Musculoskeletal:   Mild tenderness at L 3rd MCP and 4/5 PIP joints  Soft tissue swelling present at bilateral MCP joints (L > R)  L 3rd and 5th digit with limited flexion   No significant swelling, tenderness, warmth or limitations of motion at other joints examined  Neurologic: No focal sensory or motor deficits  Psychiatric: Mood and affect appropriate    LABORATORY RESULTS REVIEWED AND INTERPRETED BY ME:  Lab Results   Component Value Date/Time    CREACTPROT 0.32 2024 07:32 AM    SEDRATEWES 7 2024 07:32 AM "     Lab Results   Component Value Date/Time    RHEUMFACTN 17 (H) 05/16/2023 11:35 AM     Lab Results   Component Value Date/Time    ANTINUCAB None Detected 05/16/2023 11:35 AM     Lab Results   Component Value Date/Time    TSHULTRASEN 1.950 02/06/2024 08:29 AM    FREET4 1.26 09/13/2023 09:45 AM     Lab Results   Component Value Date/Time    25HYDROXY 45 03/15/2024 06:25 AM     Lab Results   Component Value Date/Time    WBC 10.0 11/18/2022 06:18 AM    WBC 7.0 07/20/2010 08:50 AM    RBC 5.47 (H) 11/18/2022 06:18 AM    RBC 4.96 07/20/2010 08:50 AM    HEMOGLOBIN 14.9 11/18/2022 06:18 AM    HEMATOCRIT 47.9 (H) 11/18/2022 06:18 AM    MCV 87.6 11/18/2022 06:18 AM    MCV 90 07/20/2010 08:50 AM    MCH 27.2 11/18/2022 06:18 AM    MCH 28.0 07/20/2010 08:50 AM    MCHC 31.1 (L) 11/18/2022 06:18 AM    RDW 43.9 11/18/2022 06:18 AM    RDW 15.2 (H) 07/20/2010 08:50 AM    PLATELETCT 388 11/18/2022 06:18 AM    MPV 9.7 11/18/2022 06:18 AM    NEUTS 6.32 11/18/2022 06:18 AM    NEUTS 4.3 07/20/2010 08:50 AM    LYMPHOCYTES 26.90 11/18/2022 06:18 AM    MONOCYTES 5.60 11/18/2022 06:18 AM    EOSINOPHILS 3.00 11/18/2022 06:18 AM    BASOPHILS 0.80 11/18/2022 06:18 AM    HYPOCHROMIA 1+ 03/27/2013 12:18 PM     Lab Results   Component Value Date/Time    ASTSGOT 15 02/06/2024 08:29 AM    ALTSGPT 12 02/06/2024 08:29 AM    ALKPHOSPHAT 131 (H) 02/06/2024 08:29 AM    TBILIRUBIN 0.4 02/06/2024 08:29 AM    TOTPROTEIN 7.6 02/06/2024 08:29 AM    ALBUMIN 4.2 02/06/2024 08:29 AM     Lab Results   Component Value Date/Time    SODIUM 143 03/15/2024 06:25 AM    POTASSIUM 3.9 03/15/2024 06:25 AM    CHLORIDE 104 03/15/2024 06:25 AM    CO2 24 03/15/2024 06:25 AM    GLUCOSE 125 (H) 03/15/2024 06:25 AM    BUN 14 03/15/2024 06:25 AM    CREATININE 0.61 03/15/2024 06:25 AM    CREATININE 0.77 07/20/2010 08:50 AM    BUNCREATRAT 18 07/20/2010 08:50 AM    GLOMRATE >59 07/20/2010 08:50 AM    CALCIUM 9.6 03/15/2024 06:25 AM     Lab Results   Component Value Date/Time     MICROALBUR 47.7 08/22/2024 07:40 AM    MALBCRT 988 (H) 08/22/2024 07:40 AM     Lab Results   Component Value Date/Time    COLORURINE Yellow 06/21/2021 02:33 PM    SPECGRAVITY 1.033 06/21/2021 02:33 PM    PHURINE 5.0 06/21/2021 02:33 PM    GLUCOSEUR >=1000 (A) 06/21/2021 02:33 PM    KETONES Trace (A) 06/21/2021 02:33 PM    PROTEINURIN 30 (A) 06/21/2021 02:33 PM     Lab Results   Component Value Date/Time    CHOLSTRLTOT 146 03/15/2024 06:25 AM    LDL 63 03/15/2024 06:25 AM    HDL 60 03/15/2024 06:25 AM    TRIGLYCERIDE 114 03/15/2024 06:25 AM    HBA1C 8.9 (A) 08/27/2024 08:32 AM       RADIOLOGY RESULTS REVIEWED AND INTERPRETED BY ME: See above      All relevant laboratory and imaging results reported on this note were reviewed and interpreted by me.         Assessment & Plan     Zena Hui is a 69 y.o. female with history as noted above whose presentation merits the following clinical impressions and recommendations:    1. Serologic autoimmunity  RF (17)  Serologic evidence of immunologic activity without definitive clinical evidence of underlying rheumatoid arthritis. Though positive RF may be associated with rheumatoid arthritis, in this case the level is low and is considered to be clinically insignificant as it may actually be a false positive, especially in the absence of inflammatory symptoms by history or overt synovitis on exam. Notably, aside from rheumatoid arthritis, positive RF may be detected in some individuals with certain bacterial or viral infections, inflammatory lung disease, primary biliary cholangitis, B-cell lymphomas, and in some healthy individuals, so does not always suggest the presence of underlying rheumatoid arthritis. That said, the possibility of evolving seropositive rheumatoid arthritis cannot be entirely excluded, so if symptoms of chronic inflammatory arthritis develop and/or progress, clinical follow-up for re-evaluation would be reasonable. In the meantime, reasonable to  undertake the additional more specific testing noted below for exclusionary and risk stratification purposes.   - RHEUMATOID ARTHRITIS FACTOR; Future  - CARBAMYLATED PROTEIN AB, IGG; Future  - HEP B SURFACE ANTIGEN; Future  - HEP C VIRUS ANTIBODY; Future  - Quantiferon Gold Plus TB (4 tube); Future    2. Arthralgia of multiple joints  Presentation is compatible with biomechanical arthralgia secondary to osteoarthritis, but the possibility of concomitant evolving low-grade inflammatory arthritis cannot be entirely excluded.   - Consider trial of Voltaren 1% gel 3-4 times daily as needed, topical lidocaine or capsaicin  - May benefit from paraffin wax bath for the hands, arthritis gloves, splinting for the CMC joints  - Extra strength Tylenol or oral NSAIDs prn   - Consider intra-articular steroid injection if that becomes necessary     The above assessment and plan were discussed with the patient who acknowledged understanding of the plan.    FOLLOW-UP: Return TBD.         Thank you for the opportunity to participate in the care of Zena Hui.    Nahed Tong D.O.  Rheumatologist

## 2024-09-12 LAB — CARBAMYLATED PROTEIN ANTIBODY, IGG Q6043: 2 UNITS (ref 0–19)

## 2024-09-17 DIAGNOSIS — E11.65 TYPE 2 DIABETES MELLITUS WITH HYPERGLYCEMIA, WITH LONG-TERM CURRENT USE OF INSULIN (HCC): ICD-10-CM

## 2024-09-17 DIAGNOSIS — Z79.4 TYPE 2 DIABETES MELLITUS WITH HYPERGLYCEMIA, WITH LONG-TERM CURRENT USE OF INSULIN (HCC): ICD-10-CM

## 2024-09-17 DIAGNOSIS — M25.50 ARTHRALGIA, UNSPECIFIED JOINT: ICD-10-CM

## 2024-09-17 RX ORDER — SEMAGLUTIDE 1.34 MG/ML
1 INJECTION, SOLUTION SUBCUTANEOUS
Qty: 9 ML | Refills: 3 | Status: SHIPPED | OUTPATIENT
Start: 2024-09-17

## 2024-10-29 DIAGNOSIS — E78.5 DYSLIPIDEMIA ASSOCIATED WITH TYPE 2 DIABETES MELLITUS (HCC): ICD-10-CM

## 2024-10-29 DIAGNOSIS — E11.69 DYSLIPIDEMIA ASSOCIATED WITH TYPE 2 DIABETES MELLITUS (HCC): ICD-10-CM

## 2024-10-29 DIAGNOSIS — I10 ESSENTIAL HYPERTENSION: ICD-10-CM

## 2024-10-29 RX ORDER — AMLODIPINE AND VALSARTAN 10; 320 MG/1; MG/1
1 TABLET ORAL DAILY
Qty: 100 TABLET | Refills: 1 | Status: SHIPPED | OUTPATIENT
Start: 2024-10-29

## 2024-10-29 RX ORDER — ROSUVASTATIN CALCIUM 20 MG/1
20 TABLET, COATED ORAL DAILY
Qty: 100 TABLET | Refills: 1 | Status: SHIPPED | OUTPATIENT
Start: 2024-10-29

## 2024-11-05 ENCOUNTER — OFFICE VISIT (OUTPATIENT)
Dept: CARDIOLOGY | Facility: MEDICAL CENTER | Age: 69
End: 2024-11-05
Attending: INTERNAL MEDICINE
Payer: MEDICARE

## 2024-11-05 VITALS
HEART RATE: 78 BPM | HEIGHT: 63 IN | SYSTOLIC BLOOD PRESSURE: 118 MMHG | WEIGHT: 208 LBS | RESPIRATION RATE: 16 BRPM | BODY MASS INDEX: 36.86 KG/M2 | DIASTOLIC BLOOD PRESSURE: 70 MMHG | OXYGEN SATURATION: 94 %

## 2024-11-05 DIAGNOSIS — G47.33 OSA ON CPAP: ICD-10-CM

## 2024-11-05 DIAGNOSIS — E11.65 TYPE 2 DIABETES MELLITUS WITH HYPERGLYCEMIA, WITH LONG-TERM CURRENT USE OF INSULIN (HCC): ICD-10-CM

## 2024-11-05 DIAGNOSIS — I10 ESSENTIAL HYPERTENSION: ICD-10-CM

## 2024-11-05 DIAGNOSIS — Z79.4 TYPE 2 DIABETES MELLITUS WITH HYPERGLYCEMIA, WITH LONG-TERM CURRENT USE OF INSULIN (HCC): ICD-10-CM

## 2024-11-05 DIAGNOSIS — I51.7 LVH (LEFT VENTRICULAR HYPERTROPHY): ICD-10-CM

## 2024-11-05 PROCEDURE — 99213 OFFICE O/P EST LOW 20 MIN: CPT | Performed by: INTERNAL MEDICINE

## 2024-11-05 PROCEDURE — 3074F SYST BP LT 130 MM HG: CPT | Performed by: INTERNAL MEDICINE

## 2024-11-05 PROCEDURE — 3078F DIAST BP <80 MM HG: CPT | Performed by: INTERNAL MEDICINE

## 2024-11-05 ASSESSMENT — FIBROSIS 4 INDEX: FIB4 SCORE: 0.77

## 2024-11-05 NOTE — PROGRESS NOTES
Chief Complaint   Patient presents with    Hypertension     Follow up        Subjective:   Ledy Hui is a 69 y.o. female who presents today for follow-up regarding abnormal EKG obtained preoperative setting.  She has a history of DM two with an A1c of 10.2, HTN, HLP, JASPER, obesity.  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      It has been over 3 years since have seen her back having been seen by other providers in the interim.  Initial evaluation revealed no evidence of underlying CAD.  She feels well aside from some edema that was treated and now improved.  She is exercising 3 times a week and has no changes.    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    Bronchitis     Essential hypertension, benign     High cholesterol     Hyperlipidemia     Mixed hyperlipidemia     Organic sleep apnea, unspecified     Osteopenia     Renal disorder 04/2019    kidney stones    Sleep apnea     Type 2 diabetes mellitus with microalbuminuria, with long-term current use of insulin ([facility]) 02/28/2023    Type II or unspecified type diabetes mellitus without mention of complication, not stated as uncontrolled     oral meds     Past Surgical History:   Procedure Laterality Date    KS CYSTOSCOPY,INSERT URETERAL STENT Bilateral 07/05/2021    Procedure: CYSTOSCOPY, WITH URETERAL STENT INSERTION - POSSIBLE STENT;  Surgeon: Italo Fair M.D.;  Location: SURGERY [facility];  Service: Urology    KS CYSTO/URETERO/PYELOSCOPY, DX Bilateral 07/05/2021    Procedure: URETEROSCOPY;  Surgeon: Italo Fair M.D.;  Location: [facility];  Service: Urology    LASERTRIPSY Bilateral 07/05/2021    Procedure: LITHOTRIPSY, USING LASER.;  Surgeon: Italo Fair M.D.;  Location: [facility];  Service: Urology    CYSTOSCOPY N/A 07/05/2019    Procedure: CYSTOSCOPY;  Surgeon: Espinoza Orr M.D.;  Location: SURGERY  [facility];  Service: Urology    URETEROSCOPY Right 2019    Procedure: URETEROSCOPY;  Surgeon: Espinoza Orr M.D.;  Location: SURGERY [facility];  Service: Urology    LASERTRIPSY Right 2019    Procedure: LITHOTRIPSY, USING LASER;  Surgeon: Espinoza Orr M.D.;  Location: SURGERY [facility];  Service: Urology    STENT PLACEMENT Right 2019    Procedure: STENT PLACEMENT;  Surgeon: Espinoza Orr M.D.;  Location: SURGERY [facility];  Service: Urology    OTHER  2019    lithotripsy    COLONOSCOPY WITH POLYP  10/2007    tubular adenoma    OTHER ORTHOPEDIC SURGERY N/A 1998    laminectomy    EMBOLECTOMY      remove blood clot from spinal cord    LAMINOTOMY       Family History   Problem Relation Age of Onset    Arthritis Mother     Lung Disease Mother         COPD    Hypertension Mother     Hyperlipidemia Mother     Heart Disease Father          @ 41 from heart attack    Hyperlipidemia Father     Heart Disease Brother         CAD   S/P CABG    Cancer Maternal Grandmother         Pancreatic cancer    Cancer Brother         Prostate cancer     Social History     Socioeconomic History    Marital status:      Spouse name: Not on file    Number of children: Not on file    Years of education: Not on file    Highest education level: Associate degree: academic program   Occupational History    Not on file   Tobacco Use    Smoking status: Never    Smokeless tobacco: Never   Vaping Use    Vaping status: Never Used   Substance and Sexual Activity    Alcohol use: Never    Drug use: Never    Sexual activity: Not Currently     Partners: Male     Birth control/protection: Post-Menopausal   Other Topics Concern    Not on file   Social History Narrative    Not on file     Social Drivers of Health     Financial Resource Strain: Low Risk  (2024)    Overall Financial Resource Strain (CARDIA)     Difficulty of Paying Living Expenses: Not hard at all   Food Insecurity: No  Food Insecurity (1/23/2024)    Hunger Vital Sign     Worried About Running Out of Food in the Last Year: Never true     Ran Out of Food in the Last Year: Never true   Transportation Needs: No Transportation Needs (1/23/2024)    PRAPARE - Transportation     Lack of Transportation (Medical): No     Lack of Transportation (Non-Medical): No   Physical Activity: Insufficiently Active (12/11/2022)    Exercise Vital Sign     Days of Exercise per Week: 2 days     Minutes of Exercise per Session: 30 min   Stress: No Stress Concern Present (12/11/2022)    [employer] [place] [employer] - Occupational Stress Questionnaire     Feeling of Stress : Not at all   Social Connections: Moderately Isolated (12/11/2022)    Social Connection and Isolation Panel [NHANES]     Frequency of Communication with Friends and Family: More than three times a week     Frequency of Social Gatherings with Friends and Family: Once a week     Attends Restorationist Services: Never     Active Member of Clubs or Organizations: No     Attends Club or Organization Meetings: Never     Marital Status:    Intimate Partner Violence: Not on file   Housing Stability: Low Risk  (1/23/2024)    Housing Stability Vital Sign     Unable to Pay for Housing in the Last Year: No     Number of Places Lived in the Last Year: 1     Unstable Housing in the Last Year: No     Allergies   Allergen Reactions    Amoxicillin Vomiting     Per patient happened only 1 time.      Outpatient Encounter Medications as of 11/5/2024   Medication Sig Dispense Refill    amlodipine-valsartan (EXFORGE)  MG per tablet Take 1 Tablet by mouth every day. 100 Tablet 1    rosuvastatin (CRESTOR) 20 MG Tab Take 1 Tablet by mouth every day. 100 Tablet 1    Semaglutide, 1 MG/DOSE, (OZEMPIC, 1 MG/DOSE,) 4 MG/3ML Solution Pen-injector Inject 1 mg under the skin every 7 days. 9 mL 3    TOUJEO SOLOSTAR 300 UNIT/ML Solution Pen-injector INJECT 50 UNITS UNDER THE SKIN  DAILY 13.5 mL 3     "glimepiride (AMARYL) 4 MG Tab TAKE 1 TABLET BY MOUTH TWICE A  Tablet 4    Insulin Lispro-aabc, 1 U Dial, (LYUMJEV KWIKPEN) 100 UNIT/ML Solution Pen-injector Inject 4 Units under the skin 3 times a day before meals. 5 pens 15 mL 11    metformin (GLUCOPHAGE) 1000 MG tablet TAKE 1 TABLET BY MOUTH TWICE A DAY WITH MEALS 180 Tablet 2    B Complex Vitamins (VITAMIN B COMPLEX PO) Take  by mouth. Once daily      VITAMIN D PO Take  by mouth. Once dialy      Multiple Vitamin (MULTIVITAMIN ADULT PO) Take  by mouth.      aspirin 81 MG tablet Take 81 mg by mouth every day.      [DISCONTINUED] rosuvastatin (CRESTOR) 20 MG Tab Take 1 Tablet by mouth every day. 100 Tablet 1    [DISCONTINUED] Semaglutide,0.25 or 0.5MG/DOS, (OZEMPIC, 0.25 OR 0.5 MG/DOSE,) 2 MG/3ML Solution Pen-injector Inject 0.5 mg under the skin every 7 days. 3 mL 0    [DISCONTINUED] TRULICITY 4.5 MG/0.5ML Solution Pen-injector INJECT 4.5MG UNDER SKIN ONCE WEEKLY 6 mL 3    [DISCONTINUED] furosemide (LASIX) 20 MG Tab TAKE 1 TABLET BY MOUTH EVERY DAY 90 Tablet 1    [DISCONTINUED] amlodipine-valsartan (EXFORGE)  MG per tablet TAKE 1 TABLET BY MOUTH EVERY DAY 90 Tablet 3    Continuous Blood Gluc Sensor (FREESTYLE ETELVINA 2 SENSOR) Misc USE 1 EACH EVERY 14 DAYS. 6 Each 3     No facility-administered encounter medications on file as of 11/5/2024.     Review of Systems   All other systems reviewed and are negative.       Objective:   /70 (BP Location: Left arm, Patient Position: Sitting)   Pulse 78   Resp 16   Ht 1.6 m (5' 3\")   Wt 94.3 kg (208 lb)   LMP 01/01/2009   SpO2 94%   BMI 36.85 kg/m²     Physical Exam  Vitals reviewed.   Constitutional:       General: She is not in acute distress.     Appearance: She is well-developed. She is not diaphoretic.   HENT:      Head: Normocephalic and atraumatic.      Right Ear: External ear normal.      Left Ear: External ear normal.      Mouth/Throat:      Pharynx: No oropharyngeal exudate.   Eyes:      " General: No scleral icterus.        Right eye: No discharge.         Left eye: No discharge.      Conjunctiva/sclera: Conjunctivae normal.      Pupils: Pupils are equal, round, and reactive to light.   Neck:      Thyroid: No thyromegaly.      Vascular: No JVD.      Trachea: No tracheal deviation.   Cardiovascular:      Rate and Rhythm: Normal rate and regular rhythm.      Chest Wall: PMI is not displaced.      Pulses:           Carotid pulses are 2+ on the right side and 2+ on the left side.       Radial pulses are 2+ on the right side and 2+ on the left side.        Popliteal pulses are 2+ on the right side and 2+ on the left side.        Dorsalis pedis pulses are 2+ on the right side and 2+ on the left side.        Posterior tibial pulses are 2+ on the right side and 2+ on the left side.      Heart sounds: Normal heart sounds, S1 normal and S2 normal. No murmur heard.     No friction rub. No gallop. No S3 or S4 sounds.   Pulmonary:      Effort: Pulmonary effort is normal. No respiratory distress.      Breath sounds: Normal breath sounds. No wheezing or rales.   Chest:      Chest wall: No tenderness.   Abdominal:      General: Bowel sounds are normal. There is no distension.      Palpations: Abdomen is soft.      Tenderness: There is no abdominal tenderness.   Musculoskeletal:         General: No swelling or tenderness. Normal range of motion.      Cervical back: Normal range of motion and neck supple.   Skin:     General: Skin is warm and dry.      Findings: No erythema or rash.   Neurological:      Mental Status: She is alert and oriented to person, place, and time.      Cranial Nerves: No cranial nerve deficit (Cranial nerves II through XII grossly intact).   Psychiatric:         Behavior: Behavior normal.         Thought Content: Thought content normal.         Judgment: Judgment normal.       LABS:  Lab Results   Component Value Date/Time    CHOLSTRLTOT 146 03/15/2024 06:25 AM    LDL 63 03/15/2024 06:25 AM     "HDL 60 03/15/2024 06:25 AM    TRIGLYCERIDE 114 03/15/2024 06:25 AM       Lab Results   Component Value Date/Time    WBC 10.0 11/18/2022 06:18 AM    WBC 7.0 07/20/2010 08:50 AM    RBC 5.47 (H) 11/18/2022 06:18 AM    RBC 4.96 07/20/2010 08:50 AM    HEMOGLOBIN 14.9 11/18/2022 06:18 AM    HEMATOCRIT 47.9 (H) 11/18/2022 06:18 AM    MCV 87.6 11/18/2022 06:18 AM    MCV 90 07/20/2010 08:50 AM    NEUTSPOLYS 63.20 11/18/2022 06:18 AM    LYMPHOCYTES 26.90 11/18/2022 06:18 AM    MONOCYTES 5.60 11/18/2022 06:18 AM    EOSINOPHILS 3.00 11/18/2022 06:18 AM    BASOPHILS 0.80 11/18/2022 06:18 AM    HYPOCHROMIA 1+ 03/27/2013 12:18 PM     Lab Results   Component Value Date/Time    SODIUM 143 03/15/2024 06:25 AM    POTASSIUM 3.9 03/15/2024 06:25 AM    CHLORIDE 104 03/15/2024 06:25 AM    CO2 24 03/15/2024 06:25 AM    GLUCOSE 125 (H) 03/15/2024 06:25 AM    BUN 14 03/15/2024 06:25 AM    CREATININE 0.61 03/15/2024 06:25 AM    CREATININE 0.77 07/20/2010 08:50 AM    BUNCREATRAT 18 07/20/2010 08:50 AM    GLOMRATE >59 07/20/2010 08:50 AM     Lab Results   Component Value Date    HBA1C 8.9 (A) 08/27/2024      Lab Results   Component Value Date/Time    ALKPHOSPHAT 131 (H) 02/06/2024 08:29 AM    ASTSGOT 15 02/06/2024 08:29 AM    ALTSGPT 12 02/06/2024 08:29 AM    TBILIRUBIN 0.4 02/06/2024 08:29 AM      No results found for: \"BNPBTYPENAT\"   No results found for: \"TSH\"  No results found for: \"PROTHROMBTM\", \"INR\"     EKG (6/28/2021):  I have personally reviewed the EKG this visit and discussed with the patient.  Sinus rhythm 81 bpm.  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        Assessment:     1. Essential hypertension        2. LVH (left ventricular hypertrophy)        3. JASPER on CPAP        4. Type 2 diabetes mellitus with hyperglycemia, with long-term current use of insulin (HCC)            Medical Decision Making:  Today's Assessment / Status / Plan:     Doing well no established CAD current medical therapy follow-up routinely.    "

## 2024-11-08 DIAGNOSIS — E11.65 TYPE 2 DIABETES MELLITUS WITH HYPERGLYCEMIA, WITH LONG-TERM CURRENT USE OF INSULIN (HCC): ICD-10-CM

## 2024-11-08 DIAGNOSIS — Z79.4 TYPE 2 DIABETES MELLITUS WITH HYPERGLYCEMIA, WITH LONG-TERM CURRENT USE OF INSULIN (HCC): ICD-10-CM

## 2024-11-27 ENCOUNTER — NON-PROVIDER VISIT (OUTPATIENT)
Dept: ENDOCRINOLOGY | Facility: MEDICAL CENTER | Age: 69
End: 2024-11-27
Attending: PSYCHIATRY & NEUROLOGY
Payer: MEDICARE

## 2024-11-27 VITALS
DIASTOLIC BLOOD PRESSURE: 68 MMHG | SYSTOLIC BLOOD PRESSURE: 108 MMHG | HEIGHT: 63 IN | BODY MASS INDEX: 37.49 KG/M2 | RESPIRATION RATE: 17 BRPM | HEART RATE: 75 BPM | OXYGEN SATURATION: 95 % | WEIGHT: 211.6 LBS

## 2024-11-27 DIAGNOSIS — E11.65 TYPE 2 DIABETES MELLITUS WITH HYPERGLYCEMIA, WITH LONG-TERM CURRENT USE OF INSULIN (HCC): ICD-10-CM

## 2024-11-27 DIAGNOSIS — Z79.4 TYPE 2 DIABETES MELLITUS WITH HYPERGLYCEMIA, WITH LONG-TERM CURRENT USE OF INSULIN (HCC): ICD-10-CM

## 2024-11-27 LAB
HBA1C MFR BLD: 8.1 % (ref ?–5.8)
POCT INT CON NEG: NEGATIVE
POCT INT CON POS: POSITIVE

## 2024-11-27 PROCEDURE — 83036 HEMOGLOBIN GLYCOSYLATED A1C: CPT

## 2024-11-27 PROCEDURE — 99213 OFFICE O/P EST LOW 20 MIN: CPT | Performed by: INTERNAL MEDICINE

## 2024-11-27 PROCEDURE — 95249 CONT GLUC MNTR PT PROV EQP: CPT | Performed by: INTERNAL MEDICINE

## 2024-11-27 NOTE — PROGRESS NOTES
"RN-CDE Note    Subjective:   Endocrinology Clinic Progress Note  PCP: Link Polanco III, M.D.    HPI:  Zena Hui is a 69 y.o. old patient who is seen today by the Diabetes Nurse Specialist for review of her uncontrolled type 2 diabetes with long term use of insulin.    Recent changes in health:  denies any changes in health.  Has had some increased stress due to several deaths in family.   DM:   Last A1c:   Lab Results   Component Value Date/Time    HBA1C 8.1 (A) 11/27/2024 08:01 AM      Previous A1c was 8.9 on 8/27/2024  A1C GOAL: < 7    Diabetes Medications:   Toujeo 50 units per day  Lyumjev 8 unit base plus 1:15 cho  Metformin 1000 mg bid  Glimepiride 4 mg bid  Ozempic 1 mg weekly       Exercise: walking around the [place] (2 miles) at least 5 times a week and going to the gym.   Diet: \"healthy\" diet  in general  Patient's body mass index is 37.48 kg/m². Exercise and nutrition counseling were performed at this visit.    Glucose monitoring frequency:  using Freestyle Christina        Hypoglycemic episodes: no     Last Retinal Exam: states completed at [facility], will send in paperwork  Monofilament: current.   Lab Results   Component Value Date/Time    MALBCRT 988 (H) 08/22/2024 07:40 AM    MICROALBUR 47.7 08/22/2024 07:40 AM        ACR Albumin/Creatinine Ratio goal <30     HTN:   Blood pressure goal <130/<80   Currently Rx ACE/ARB:  Valsartan 320 mg daily    Dyslipidemia:    Lab Results   Component Value Date/Time    CHOLSTRLTOT 146 03/15/2024 06:25 AM    LDL 63 03/15/2024 06:25 AM    HDL 60 03/15/2024 06:25 AM    TRIGLYCERIDE 114 03/15/2024 06:25 AM         Currently Rx Statin:  Rosuvastatin 20 mg daily    She  reports that she has never smoked. She has never used smokeless tobacco.    Plan:     Discussed and educated on:   - All medications, side effects and compliance (discussed carefully)  - Annual eye examinations at Ophthalmology  - HbA1C: target  - Home glucose monitoring emphasized  - Weight control and " daily exercise    Recommended medication changes: increase Lyumjev with breakfast to base dose of 10.

## 2024-12-02 DIAGNOSIS — Z79.4 TYPE 2 DIABETES MELLITUS WITH HYPERGLYCEMIA, WITH LONG-TERM CURRENT USE OF INSULIN (HCC): ICD-10-CM

## 2024-12-02 DIAGNOSIS — E11.65 TYPE 2 DIABETES MELLITUS WITH HYPERGLYCEMIA, WITH LONG-TERM CURRENT USE OF INSULIN (HCC): ICD-10-CM

## 2024-12-03 ENCOUNTER — TELEPHONE (OUTPATIENT)
Dept: ENDOCRINOLOGY | Facility: MEDICAL CENTER | Age: 69
End: 2024-12-03
Payer: MEDICARE

## 2024-12-03 DIAGNOSIS — E11.65 TYPE 2 DIABETES MELLITUS WITH HYPERGLYCEMIA, WITH LONG-TERM CURRENT USE OF INSULIN (HCC): ICD-10-CM

## 2024-12-03 DIAGNOSIS — Z79.4 TYPE 2 DIABETES MELLITUS WITH HYPERGLYCEMIA, WITH LONG-TERM CURRENT USE OF INSULIN (HCC): ICD-10-CM

## 2024-12-03 RX ORDER — INSULIN LISPRO-AABC 100 [IU]/ML
10 INJECTION, SOLUTION SUBCUTANEOUS
Qty: 45 ML | Refills: 2 | Status: CANCELLED | OUTPATIENT
Start: 2024-12-03

## 2024-12-03 RX ORDER — INSULIN LISPRO-AABC 100 [IU]/ML
10 INJECTION, SOLUTION SUBCUTANEOUS
Qty: 45 ML | Refills: 2 | Status: SHIPPED | OUTPATIENT
Start: 2024-12-03 | End: 2024-12-03 | Stop reason: SDUPTHER

## 2024-12-03 RX ORDER — INSULIN LISPRO-AABC 100 [IU]/ML
12 INJECTION, SOLUTION SUBCUTANEOUS
Qty: 45 ML | Refills: 2 | Status: SHIPPED | OUTPATIENT
Start: 2024-12-03

## 2024-12-03 NOTE — TELEPHONE ENCOUNTER
Pt called to receive an update on her Rx refill request because her insulin has been increased and she is out and her pharmacy can't renew it unless she gets a new Rx stating the dose increase.    I let her know a new Rx made and should be released to her pharmacy by end of day for 10 units 3 times a day.    She wanted it to be increased to 12 units so she knows she can be safe.    I let her know I would put in a new request and to give it 48-72 hrs to hear back.    She said ok.

## 2024-12-03 NOTE — TELEPHONE ENCOUNTER
Received request via: Patient    Was the patient seen in the last year in this department? Yes    Does the patient have an active prescription (recently filled or refills available) for medication(s) requested? No    Pharmacy Name: [facility]     Does the patient have custodial Plus and need 100-day supply? (This applies to ALL medications) Yes, quantity updated to 100 days

## 2024-12-04 ENCOUNTER — HOSPITAL ENCOUNTER (OUTPATIENT)
Facility: MEDICAL CENTER | Age: 69
End: 2024-12-05
Attending: EMERGENCY MEDICINE | Admitting: INTERNAL MEDICINE
Payer: MEDICARE

## 2024-12-04 ENCOUNTER — APPOINTMENT (OUTPATIENT)
Dept: RADIOLOGY | Facility: MEDICAL CENTER | Age: 69
End: 2024-12-04
Attending: EMERGENCY MEDICINE
Payer: MEDICARE

## 2024-12-04 ENCOUNTER — ANESTHESIA (OUTPATIENT)
Dept: SURGERY | Facility: MEDICAL CENTER | Age: 69
End: 2024-12-04
Payer: MEDICARE

## 2024-12-04 ENCOUNTER — APPOINTMENT (OUTPATIENT)
Dept: RADIOLOGY | Facility: MEDICAL CENTER | Age: 69
End: 2024-12-04
Attending: UROLOGY
Payer: MEDICARE

## 2024-12-04 ENCOUNTER — ANESTHESIA EVENT (OUTPATIENT)
Dept: SURGERY | Facility: MEDICAL CENTER | Age: 69
End: 2024-12-04
Payer: MEDICARE

## 2024-12-04 DIAGNOSIS — N30.00 ACUTE CYSTITIS WITHOUT HEMATURIA: ICD-10-CM

## 2024-12-04 DIAGNOSIS — N20.1 URETEROLITHIASIS: ICD-10-CM

## 2024-12-04 PROBLEM — R74.8 ELEVATED LIPASE: Status: ACTIVE | Noted: 2024-12-04

## 2024-12-04 PROBLEM — D72.829 LEUKOCYTOSIS: Status: ACTIVE | Noted: 2024-12-04

## 2024-12-04 PROBLEM — R82.90 ABNORMAL URINALYSIS: Status: ACTIVE | Noted: 2024-12-04

## 2024-12-04 PROBLEM — R73.9 HYPERGLYCEMIA: Status: ACTIVE | Noted: 2024-12-04

## 2024-12-04 LAB
ALBUMIN SERPL BCP-MCNC: 4.5 G/DL (ref 3.2–4.9)
ALBUMIN/GLOB SERPL: 1.6 G/DL
ALP SERPL-CCNC: 138 U/L (ref 30–99)
ALT SERPL-CCNC: 21 U/L (ref 2–50)
AMORPHOUS CRYSTALS 1764: PRESENT /HPF
ANION GAP SERPL CALC-SCNC: 16 MMOL/L (ref 7–16)
APPEARANCE UR: CLEAR
AST SERPL-CCNC: 20 U/L (ref 12–45)
BACTERIA #/AREA URNS HPF: ABNORMAL /HPF
BASOPHILS # BLD AUTO: 0.4 % (ref 0–1.8)
BASOPHILS # BLD: 0.07 K/UL (ref 0–0.12)
BILIRUB SERPL-MCNC: 0.7 MG/DL (ref 0.1–1.5)
BILIRUB UR QL STRIP.AUTO: NEGATIVE
BUN SERPL-MCNC: 15 MG/DL (ref 8–22)
CALCIUM ALBUM COR SERPL-MCNC: 8.8 MG/DL (ref 8.5–10.5)
CALCIUM SERPL-MCNC: 9.2 MG/DL (ref 8.5–10.5)
CASTS URNS QL MICRO: ABNORMAL /LPF (ref 0–2)
CHLORIDE SERPL-SCNC: 102 MMOL/L (ref 96–112)
CO2 SERPL-SCNC: 22 MMOL/L (ref 20–33)
COLOR UR: YELLOW
CREAT SERPL-MCNC: 0.68 MG/DL (ref 0.5–1.4)
EKG IMPRESSION: NORMAL
EOSINOPHIL # BLD AUTO: 0.16 K/UL (ref 0–0.51)
EOSINOPHIL NFR BLD: 1 % (ref 0–6.9)
EPITHELIAL CELLS 1715: ABNORMAL /HPF (ref 0–5)
EPITHELIAL CELLS RENAL  1715R: PRESENT /HPF
ERYTHROCYTE [DISTWIDTH] IN BLOOD BY AUTOMATED COUNT: 43.9 FL (ref 35.9–50)
GFR SERPLBLD CREATININE-BSD FMLA CKD-EPI: 94 ML/MIN/1.73 M 2
GLOBULIN SER CALC-MCNC: 2.9 G/DL (ref 1.9–3.5)
GLUCOSE BLD STRIP.AUTO-MCNC: 200 MG/DL (ref 65–99)
GLUCOSE BLD STRIP.AUTO-MCNC: 286 MG/DL (ref 65–99)
GLUCOSE SERPL-MCNC: 290 MG/DL (ref 65–99)
GLUCOSE UR STRIP.AUTO-MCNC: >=1000 MG/DL
HCT VFR BLD AUTO: 43.8 % (ref 37–47)
HGB BLD-MCNC: 14.3 G/DL (ref 12–16)
IMM GRANULOCYTES # BLD AUTO: 0.06 K/UL (ref 0–0.11)
IMM GRANULOCYTES NFR BLD AUTO: 0.4 % (ref 0–0.9)
KETONES UR STRIP.AUTO-MCNC: 15 MG/DL
LEUKOCYTE ESTERASE UR QL STRIP.AUTO: NEGATIVE
LIPASE SERPL-CCNC: 179 U/L (ref 11–82)
LYMPHOCYTES # BLD AUTO: 1.66 K/UL (ref 1–4.8)
LYMPHOCYTES NFR BLD: 10.6 % (ref 22–41)
MCH RBC QN AUTO: 27.2 PG (ref 27–33)
MCHC RBC AUTO-ENTMCNC: 32.6 G/DL (ref 32.2–35.5)
MCV RBC AUTO: 83.4 FL (ref 81.4–97.8)
MICRO URNS: ABNORMAL
MONOCYTES # BLD AUTO: 0.66 K/UL (ref 0–0.85)
MONOCYTES NFR BLD AUTO: 4.2 % (ref 0–13.4)
NEUTROPHILS # BLD AUTO: 13.07 K/UL (ref 1.82–7.42)
NEUTROPHILS NFR BLD: 83.4 % (ref 44–72)
NITRITE UR QL STRIP.AUTO: NEGATIVE
NRBC # BLD AUTO: 0 K/UL
NRBC BLD-RTO: 0 /100 WBC (ref 0–0.2)
PH UR STRIP.AUTO: 5.5 [PH] (ref 5–8)
PLATELET # BLD AUTO: 342 K/UL (ref 164–446)
PMV BLD AUTO: 9.4 FL (ref 9–12.9)
POTASSIUM SERPL-SCNC: 3.6 MMOL/L (ref 3.6–5.5)
PROT SERPL-MCNC: 7.4 G/DL (ref 6–8.2)
PROT UR QL STRIP: 300 MG/DL
RBC # BLD AUTO: 5.25 M/UL (ref 4.2–5.4)
RBC # URNS HPF: ABNORMAL /HPF (ref 0–2)
RBC UR QL AUTO: ABNORMAL
SODIUM SERPL-SCNC: 140 MMOL/L (ref 135–145)
SP GR UR STRIP.AUTO: 1.02
UROBILINOGEN UR STRIP.AUTO-MCNC: 0.2 EU/DL
WBC # BLD AUTO: 15.7 K/UL (ref 4.8–10.8)
WBC #/AREA URNS HPF: ABNORMAL /HPF

## 2024-12-04 PROCEDURE — 700101 HCHG RX REV CODE 250: Performed by: ANESTHESIOLOGY

## 2024-12-04 PROCEDURE — 700111 HCHG RX REV CODE 636 W/ 250 OVERRIDE (IP): Mod: JZ,JG | Performed by: INTERNAL MEDICINE

## 2024-12-04 PROCEDURE — 700105 HCHG RX REV CODE 258: Performed by: EMERGENCY MEDICINE

## 2024-12-04 PROCEDURE — 700105 HCHG RX REV CODE 258: Performed by: INTERNAL MEDICINE

## 2024-12-04 PROCEDURE — 36415 COLL VENOUS BLD VENIPUNCTURE: CPT

## 2024-12-04 PROCEDURE — 74176 CT ABD & PELVIS W/O CONTRAST: CPT

## 2024-12-04 PROCEDURE — 160028 HCHG SURGERY MINUTES - 1ST 30 MINS LEVEL 3: Performed by: UROLOGY

## 2024-12-04 PROCEDURE — 700111 HCHG RX REV CODE 636 W/ 250 OVERRIDE (IP): Mod: JZ | Performed by: EMERGENCY MEDICINE

## 2024-12-04 PROCEDURE — 160048 HCHG OR STATISTICAL LEVEL 1-5: Performed by: UROLOGY

## 2024-12-04 PROCEDURE — 160009 HCHG ANES TIME/MIN: Performed by: UROLOGY

## 2024-12-04 PROCEDURE — 160002 HCHG RECOVERY MINUTES (STAT): Performed by: UROLOGY

## 2024-12-04 PROCEDURE — 96366 THER/PROPH/DIAG IV INF ADDON: CPT | Mod: XU

## 2024-12-04 PROCEDURE — 81001 URINALYSIS AUTO W/SCOPE: CPT

## 2024-12-04 PROCEDURE — 93005 ELECTROCARDIOGRAM TRACING: CPT | Mod: TC | Performed by: UROLOGY

## 2024-12-04 PROCEDURE — A9270 NON-COVERED ITEM OR SERVICE: HCPCS | Performed by: INTERNAL MEDICINE

## 2024-12-04 PROCEDURE — G0378 HOSPITAL OBSERVATION PER HR: HCPCS

## 2024-12-04 PROCEDURE — 96365 THER/PROPH/DIAG IV INF INIT: CPT | Mod: XU

## 2024-12-04 PROCEDURE — 96372 THER/PROPH/DIAG INJ SC/IM: CPT | Mod: XU

## 2024-12-04 PROCEDURE — 96375 TX/PRO/DX INJ NEW DRUG ADDON: CPT | Mod: XU

## 2024-12-04 PROCEDURE — 700102 HCHG RX REV CODE 250 W/ 637 OVERRIDE(OP): Performed by: INTERNAL MEDICINE

## 2024-12-04 PROCEDURE — C1769 GUIDE WIRE: HCPCS | Performed by: UROLOGY

## 2024-12-04 PROCEDURE — 85025 COMPLETE CBC W/AUTO DIFF WBC: CPT

## 2024-12-04 PROCEDURE — 80053 COMPREHEN METABOLIC PANEL: CPT

## 2024-12-04 PROCEDURE — 700111 HCHG RX REV CODE 636 W/ 250 OVERRIDE (IP): Performed by: ANESTHESIOLOGY

## 2024-12-04 PROCEDURE — 99285 EMERGENCY DEPT VISIT HI MDM: CPT

## 2024-12-04 PROCEDURE — 82962 GLUCOSE BLOOD TEST: CPT

## 2024-12-04 PROCEDURE — 700105 HCHG RX REV CODE 258: Performed by: ANESTHESIOLOGY

## 2024-12-04 PROCEDURE — 160035 HCHG PACU - 1ST 60 MINS PHASE I: Performed by: UROLOGY

## 2024-12-04 PROCEDURE — 99223 1ST HOSP IP/OBS HIGH 75: CPT | Performed by: INTERNAL MEDICINE

## 2024-12-04 PROCEDURE — 83690 ASSAY OF LIPASE: CPT

## 2024-12-04 PROCEDURE — C2617 STENT, NON-COR, TEM W/O DEL: HCPCS | Performed by: UROLOGY

## 2024-12-04 DEVICE — STENT UROLOGICAL POLARIS 6X24 ULTRA: Type: IMPLANTABLE DEVICE | Site: URETER | Status: FUNCTIONAL

## 2024-12-04 RX ORDER — OXYCODONE HYDROCHLORIDE 10 MG/1
10 TABLET ORAL
Status: DISCONTINUED | OUTPATIENT
Start: 2024-12-04 | End: 2024-12-05 | Stop reason: HOSPADM

## 2024-12-04 RX ORDER — EPHEDRINE SULFATE 50 MG/ML
5 INJECTION, SOLUTION INTRAVENOUS
Status: DISCONTINUED | OUTPATIENT
Start: 2024-12-04 | End: 2024-12-04 | Stop reason: HOSPADM

## 2024-12-04 RX ORDER — ONDANSETRON 4 MG/1
4 TABLET, ORALLY DISINTEGRATING ORAL EVERY 4 HOURS PRN
Status: DISCONTINUED | OUTPATIENT
Start: 2024-12-04 | End: 2024-12-05 | Stop reason: HOSPADM

## 2024-12-04 RX ORDER — AMOXICILLIN 250 MG
2 CAPSULE ORAL EVERY EVENING
Status: DISCONTINUED | OUTPATIENT
Start: 2024-12-04 | End: 2024-12-05 | Stop reason: HOSPADM

## 2024-12-04 RX ORDER — ROSUVASTATIN CALCIUM 20 MG/1
20 TABLET, COATED ORAL EVERY EVENING
Status: DISCONTINUED | OUTPATIENT
Start: 2024-12-04 | End: 2024-12-05 | Stop reason: HOSPADM

## 2024-12-04 RX ORDER — LABETALOL HYDROCHLORIDE 5 MG/ML
10 INJECTION, SOLUTION INTRAVENOUS EVERY 4 HOURS PRN
Status: DISCONTINUED | OUTPATIENT
Start: 2024-12-04 | End: 2024-12-05 | Stop reason: HOSPADM

## 2024-12-04 RX ORDER — CEFAZOLIN SODIUM 1 G/50ML
1 INJECTION, SOLUTION INTRAVENOUS EVERY 8 HOURS
Status: DISCONTINUED | OUTPATIENT
Start: 2024-12-04 | End: 2024-12-05 | Stop reason: HOSPADM

## 2024-12-04 RX ORDER — ACETAMINOPHEN 325 MG/1
650 TABLET ORAL EVERY 6 HOURS PRN
Status: DISCONTINUED | OUTPATIENT
Start: 2024-12-04 | End: 2024-12-05 | Stop reason: HOSPADM

## 2024-12-04 RX ORDER — OXYCODONE HCL 5 MG/5 ML
5 SOLUTION, ORAL ORAL
Status: DISCONTINUED | OUTPATIENT
Start: 2024-12-04 | End: 2024-12-04 | Stop reason: HOSPADM

## 2024-12-04 RX ORDER — AMLODIPINE BESYLATE 10 MG/1
10 TABLET ORAL EVERY EVENING
Status: DISCONTINUED | OUTPATIENT
Start: 2024-12-04 | End: 2024-12-05 | Stop reason: HOSPADM

## 2024-12-04 RX ORDER — SODIUM CHLORIDE 9 MG/ML
1000 INJECTION, SOLUTION INTRAVENOUS ONCE
Status: COMPLETED | OUTPATIENT
Start: 2024-12-04 | End: 2024-12-04

## 2024-12-04 RX ORDER — HYDROMORPHONE HYDROCHLORIDE 1 MG/ML
0.1 INJECTION, SOLUTION INTRAMUSCULAR; INTRAVENOUS; SUBCUTANEOUS
Status: DISCONTINUED | OUTPATIENT
Start: 2024-12-04 | End: 2024-12-04 | Stop reason: HOSPADM

## 2024-12-04 RX ORDER — DEXTROSE MONOHYDRATE 25 G/50ML
25 INJECTION, SOLUTION INTRAVENOUS
Status: DISCONTINUED | OUTPATIENT
Start: 2024-12-04 | End: 2024-12-05 | Stop reason: HOSPADM

## 2024-12-04 RX ORDER — KETOROLAC TROMETHAMINE 15 MG/ML
INJECTION, SOLUTION INTRAMUSCULAR; INTRAVENOUS PRN
Status: DISCONTINUED | OUTPATIENT
Start: 2024-12-04 | End: 2024-12-04 | Stop reason: SURG

## 2024-12-04 RX ORDER — ALBUTEROL SULFATE 5 MG/ML
2.5 SOLUTION RESPIRATORY (INHALATION)
Status: DISCONTINUED | OUTPATIENT
Start: 2024-12-04 | End: 2024-12-04 | Stop reason: HOSPADM

## 2024-12-04 RX ORDER — KETOROLAC TROMETHAMINE 15 MG/ML
15 INJECTION, SOLUTION INTRAMUSCULAR; INTRAVENOUS EVERY 6 HOURS PRN
Status: DISCONTINUED | OUTPATIENT
Start: 2024-12-04 | End: 2024-12-05 | Stop reason: HOSPADM

## 2024-12-04 RX ORDER — LIDOCAINE HYDROCHLORIDE 20 MG/ML
INJECTION, SOLUTION EPIDURAL; INFILTRATION; INTRACAUDAL; PERINEURAL PRN
Status: DISCONTINUED | OUTPATIENT
Start: 2024-12-04 | End: 2024-12-04 | Stop reason: SURG

## 2024-12-04 RX ORDER — SODIUM CHLORIDE, SODIUM LACTATE, POTASSIUM CHLORIDE, CALCIUM CHLORIDE 600; 310; 30; 20 MG/100ML; MG/100ML; MG/100ML; MG/100ML
INJECTION, SOLUTION INTRAVENOUS CONTINUOUS
Status: DISCONTINUED | OUTPATIENT
Start: 2024-12-04 | End: 2024-12-04 | Stop reason: HOSPADM

## 2024-12-04 RX ORDER — HYDRALAZINE HYDROCHLORIDE 20 MG/ML
5 INJECTION INTRAMUSCULAR; INTRAVENOUS
Status: DISCONTINUED | OUTPATIENT
Start: 2024-12-04 | End: 2024-12-04 | Stop reason: HOSPADM

## 2024-12-04 RX ORDER — SODIUM CHLORIDE, SODIUM LACTATE, POTASSIUM CHLORIDE, CALCIUM CHLORIDE 600; 310; 30; 20 MG/100ML; MG/100ML; MG/100ML; MG/100ML
INJECTION, SOLUTION INTRAVENOUS
Status: DISCONTINUED | OUTPATIENT
Start: 2024-12-04 | End: 2024-12-04 | Stop reason: SURG

## 2024-12-04 RX ORDER — POLYETHYLENE GLYCOL 3350 17 G/17G
1 POWDER, FOR SOLUTION ORAL
Status: DISCONTINUED | OUTPATIENT
Start: 2024-12-04 | End: 2024-12-05 | Stop reason: HOSPADM

## 2024-12-04 RX ORDER — LABETALOL HYDROCHLORIDE 5 MG/ML
5 INJECTION, SOLUTION INTRAVENOUS
Status: DISCONTINUED | OUTPATIENT
Start: 2024-12-04 | End: 2024-12-04 | Stop reason: HOSPADM

## 2024-12-04 RX ORDER — HALOPERIDOL 5 MG/ML
1 INJECTION INTRAMUSCULAR
Status: DISCONTINUED | OUTPATIENT
Start: 2024-12-04 | End: 2024-12-04 | Stop reason: HOSPADM

## 2024-12-04 RX ORDER — ONDANSETRON 2 MG/ML
INJECTION INTRAMUSCULAR; INTRAVENOUS PRN
Status: DISCONTINUED | OUTPATIENT
Start: 2024-12-04 | End: 2024-12-04 | Stop reason: SURG

## 2024-12-04 RX ORDER — SODIUM CHLORIDE 9 MG/ML
INJECTION, SOLUTION INTRAVENOUS CONTINUOUS
Status: DISCONTINUED | OUTPATIENT
Start: 2024-12-04 | End: 2024-12-05 | Stop reason: HOSPADM

## 2024-12-04 RX ORDER — INSULIN LISPRO 100 [IU]/ML
3-14 INJECTION, SOLUTION INTRAVENOUS; SUBCUTANEOUS EVERY 6 HOURS
Status: DISCONTINUED | OUTPATIENT
Start: 2024-12-04 | End: 2024-12-05 | Stop reason: HOSPADM

## 2024-12-04 RX ORDER — CEFAZOLIN SODIUM 1 G/3ML
INJECTION, POWDER, FOR SOLUTION INTRAMUSCULAR; INTRAVENOUS PRN
Status: DISCONTINUED | OUTPATIENT
Start: 2024-12-04 | End: 2024-12-04 | Stop reason: SURG

## 2024-12-04 RX ORDER — ONDANSETRON 2 MG/ML
4 INJECTION INTRAMUSCULAR; INTRAVENOUS EVERY 4 HOURS PRN
Status: DISCONTINUED | OUTPATIENT
Start: 2024-12-04 | End: 2024-12-05 | Stop reason: HOSPADM

## 2024-12-04 RX ORDER — CEFAZOLIN SODIUM 1 G/50ML
1 INJECTION, SOLUTION INTRAVENOUS ONCE
Status: COMPLETED | OUTPATIENT
Start: 2024-12-04 | End: 2024-12-04

## 2024-12-04 RX ORDER — OXYCODONE HCL 5 MG/5 ML
10 SOLUTION, ORAL ORAL
Status: DISCONTINUED | OUTPATIENT
Start: 2024-12-04 | End: 2024-12-04 | Stop reason: HOSPADM

## 2024-12-04 RX ORDER — ONDANSETRON 2 MG/ML
4 INJECTION INTRAMUSCULAR; INTRAVENOUS
Status: DISCONTINUED | OUTPATIENT
Start: 2024-12-04 | End: 2024-12-04 | Stop reason: HOSPADM

## 2024-12-04 RX ORDER — HYDROMORPHONE HYDROCHLORIDE 1 MG/ML
0.4 INJECTION, SOLUTION INTRAMUSCULAR; INTRAVENOUS; SUBCUTANEOUS
Status: DISCONTINUED | OUTPATIENT
Start: 2024-12-04 | End: 2024-12-04 | Stop reason: HOSPADM

## 2024-12-04 RX ORDER — HYDROMORPHONE HYDROCHLORIDE 1 MG/ML
0.2 INJECTION, SOLUTION INTRAMUSCULAR; INTRAVENOUS; SUBCUTANEOUS
Status: DISCONTINUED | OUTPATIENT
Start: 2024-12-04 | End: 2024-12-04 | Stop reason: HOSPADM

## 2024-12-04 RX ORDER — DEXAMETHASONE SODIUM PHOSPHATE 4 MG/ML
INJECTION, SOLUTION INTRA-ARTICULAR; INTRALESIONAL; INTRAMUSCULAR; INTRAVENOUS; SOFT TISSUE PRN
Status: DISCONTINUED | OUTPATIENT
Start: 2024-12-04 | End: 2024-12-04 | Stop reason: SURG

## 2024-12-04 RX ORDER — VALSARTAN 80 MG/1
320 TABLET ORAL EVERY EVENING
Status: DISCONTINUED | OUTPATIENT
Start: 2024-12-04 | End: 2024-12-05 | Stop reason: HOSPADM

## 2024-12-04 RX ORDER — ONDANSETRON 2 MG/ML
4 INJECTION INTRAMUSCULAR; INTRAVENOUS ONCE
Status: COMPLETED | OUTPATIENT
Start: 2024-12-04 | End: 2024-12-04

## 2024-12-04 RX ORDER — DIPHENHYDRAMINE HYDROCHLORIDE 50 MG/ML
12.5 INJECTION INTRAMUSCULAR; INTRAVENOUS
Status: DISCONTINUED | OUTPATIENT
Start: 2024-12-04 | End: 2024-12-04 | Stop reason: HOSPADM

## 2024-12-04 RX ORDER — HYDROMORPHONE HYDROCHLORIDE 1 MG/ML
0.5 INJECTION, SOLUTION INTRAMUSCULAR; INTRAVENOUS; SUBCUTANEOUS
Status: DISCONTINUED | OUTPATIENT
Start: 2024-12-04 | End: 2024-12-05 | Stop reason: HOSPADM

## 2024-12-04 RX ORDER — AMLODIPINE AND VALSARTAN 10; 320 MG/1; MG/1
1 TABLET ORAL DAILY
Status: DISCONTINUED | OUTPATIENT
Start: 2024-12-04 | End: 2024-12-04

## 2024-12-04 RX ORDER — OXYCODONE HYDROCHLORIDE 5 MG/1
5 TABLET ORAL
Status: DISCONTINUED | OUTPATIENT
Start: 2024-12-04 | End: 2024-12-05 | Stop reason: HOSPADM

## 2024-12-04 RX ADMIN — SODIUM CHLORIDE 1000 ML: 9 INJECTION, SOLUTION INTRAVENOUS at 07:15

## 2024-12-04 RX ADMIN — ROSUVASTATIN CALCIUM 20 MG: 20 TABLET, FILM COATED ORAL at 17:43

## 2024-12-04 RX ADMIN — ONDANSETRON 4 MG: 2 INJECTION INTRAMUSCULAR; INTRAVENOUS at 07:18

## 2024-12-04 RX ADMIN — PROPOFOL 150 MG: 10 INJECTION, EMULSION INTRAVENOUS at 14:11

## 2024-12-04 RX ADMIN — SODIUM CHLORIDE: 9 INJECTION, SOLUTION INTRAVENOUS at 23:13

## 2024-12-04 RX ADMIN — FENTANYL CITRATE 50 MCG: 50 INJECTION, SOLUTION INTRAMUSCULAR; INTRAVENOUS at 14:19

## 2024-12-04 RX ADMIN — FENTANYL CITRATE 50 MCG: 50 INJECTION, SOLUTION INTRAMUSCULAR; INTRAVENOUS at 07:18

## 2024-12-04 RX ADMIN — LIDOCAINE HYDROCHLORIDE 50 MG: 20 INJECTION, SOLUTION EPIDURAL; INFILTRATION; INTRACAUDAL; PERINEURAL at 14:11

## 2024-12-04 RX ADMIN — FENTANYL CITRATE 50 MCG: 50 INJECTION, SOLUTION INTRAMUSCULAR; INTRAVENOUS at 14:12

## 2024-12-04 RX ADMIN — SODIUM CHLORIDE, POTASSIUM CHLORIDE, SODIUM LACTATE AND CALCIUM CHLORIDE: 600; 310; 30; 20 INJECTION, SOLUTION INTRAVENOUS at 14:05

## 2024-12-04 RX ADMIN — KETOROLAC TROMETHAMINE 15 MG: 15 INJECTION, SOLUTION INTRAMUSCULAR; INTRAVENOUS at 14:27

## 2024-12-04 RX ADMIN — CEFAZOLIN SODIUM 1 G: 1 INJECTION, SOLUTION INTRAVENOUS at 09:04

## 2024-12-04 RX ADMIN — VALSARTAN 320 MG: 80 TABLET ORAL at 17:43

## 2024-12-04 RX ADMIN — CEFAZOLIN 2 G: 1 INJECTION, POWDER, FOR SOLUTION INTRAMUSCULAR; INTRAVENOUS at 14:12

## 2024-12-04 RX ADMIN — ONDANSETRON 4 MG: 2 INJECTION INTRAMUSCULAR; INTRAVENOUS at 14:27

## 2024-12-04 RX ADMIN — DEXAMETHASONE SODIUM PHOSPHATE 4 MG: 4 INJECTION INTRA-ARTICULAR; INTRALESIONAL; INTRAMUSCULAR; INTRAVENOUS; SOFT TISSUE at 14:12

## 2024-12-04 RX ADMIN — AMLODIPINE BESYLATE 10 MG: 10 TABLET ORAL at 17:43

## 2024-12-04 RX ADMIN — SODIUM CHLORIDE: 9 INJECTION, SOLUTION INTRAVENOUS at 09:15

## 2024-12-04 RX ADMIN — CEFAZOLIN SODIUM 1 G: 1 INJECTION, SOLUTION INTRAVENOUS at 17:46

## 2024-12-04 RX ADMIN — SENNOSIDES AND DOCUSATE SODIUM 2 TABLET: 50; 8.6 TABLET ORAL at 17:43

## 2024-12-04 RX ADMIN — KETOROLAC TROMETHAMINE 15 MG: 15 INJECTION, SOLUTION INTRAMUSCULAR; INTRAVENOUS at 09:11

## 2024-12-04 RX ADMIN — POLYETHYLENE GLYCOL 3350 1 PACKET: 17 POWDER, FOR SOLUTION ORAL at 17:43

## 2024-12-04 RX ADMIN — INSULIN LISPRO 7 UNITS: 100 INJECTION, SOLUTION INTRAVENOUS; SUBCUTANEOUS at 17:53

## 2024-12-04 ASSESSMENT — PAIN DESCRIPTION - PAIN TYPE
TYPE: ACUTE PAIN

## 2024-12-04 ASSESSMENT — PATIENT HEALTH QUESTIONNAIRE - PHQ9
2. FEELING DOWN, DEPRESSED, IRRITABLE, OR HOPELESS: NOT AT ALL
1. LITTLE INTEREST OR PLEASURE IN DOING THINGS: NOT AT ALL
SUM OF ALL RESPONSES TO PHQ9 QUESTIONS 1 AND 2: 0

## 2024-12-04 ASSESSMENT — LIFESTYLE VARIABLES
ON A TYPICAL DAY WHEN YOU DRINK ALCOHOL HOW MANY DRINKS DO YOU HAVE: 0
EVER HAD A DRINK FIRST THING IN THE MORNING TO STEADY YOUR NERVES TO GET RID OF A HANGOVER: NO
TOTAL SCORE: 0
HAVE PEOPLE ANNOYED YOU BY CRITICIZING YOUR DRINKING: NO
HOW MANY TIMES IN THE PAST YEAR HAVE YOU HAD 5 OR MORE DRINKS IN A DAY: 0
TOTAL SCORE: 0
ALCOHOL_USE: NO
TOTAL SCORE: 0
AVERAGE NUMBER OF DAYS PER WEEK YOU HAVE A DRINK CONTAINING ALCOHOL: 0
EVER FELT BAD OR GUILTY ABOUT YOUR DRINKING: NO
HAVE YOU EVER FELT YOU SHOULD CUT DOWN ON YOUR DRINKING: NO
DOES PATIENT WANT TO STOP DRINKING: NO
CONSUMPTION TOTAL: NEGATIVE

## 2024-12-04 ASSESSMENT — ENCOUNTER SYMPTOMS
CHILLS: 0
FLANK PAIN: 1
SHORTNESS OF BREATH: 0
ABDOMINAL PAIN: 1
HEADACHES: 0
SPUTUM PRODUCTION: 0
CONSTIPATION: 1
MYALGIAS: 0
PALPITATIONS: 0
FEVER: 0
DIZZINESS: 0
FALLS: 0
WEAKNESS: 0
NAUSEA: 0
LOSS OF CONSCIOUSNESS: 0
STRIDOR: 0
TINGLING: 0
VOMITING: 0
COUGH: 0
DIARRHEA: 0
DEPRESSION: 0
FEVER: 1

## 2024-12-04 ASSESSMENT — COGNITIVE AND FUNCTIONAL STATUS - GENERAL
MOBILITY SCORE: 24
DAILY ACTIVITIY SCORE: 24
SUGGESTED CMS G CODE MODIFIER DAILY ACTIVITY: CH
SUGGESTED CMS G CODE MODIFIER MOBILITY: CH
SUGGESTED CMS G CODE MODIFIER MOBILITY: CH
MOBILITY SCORE: 24
SUGGESTED CMS G CODE MODIFIER DAILY ACTIVITY: CH
DAILY ACTIVITIY SCORE: 24

## 2024-12-04 ASSESSMENT — SOCIAL DETERMINANTS OF HEALTH (SDOH)
WITHIN THE LAST YEAR, HAVE YOU BEEN KICKED, HIT, SLAPPED, OR OTHERWISE PHYSICALLY HURT BY YOUR PARTNER OR EX-PARTNER?: PATIENT DECLINED
WITHIN THE LAST YEAR, HAVE TO BEEN RAPED OR FORCED TO HAVE ANY KIND OF SEXUAL ACTIVITY BY YOUR PARTNER OR EX-PARTNER?: PATIENT DECLINED
WITHIN THE LAST YEAR, HAVE YOU BEEN AFRAID OF YOUR PARTNER OR EX-PARTNER?: PATIENT DECLINED
WITHIN THE PAST 12 MONTHS, YOU WORRIED THAT YOUR FOOD WOULD RUN OUT BEFORE YOU GOT THE MONEY TO BUY MORE: NEVER TRUE
WITHIN THE PAST 12 MONTHS, THE FOOD YOU BOUGHT JUST DIDN'T LAST AND YOU DIDN'T HAVE MONEY TO GET MORE: NEVER TRUE
IN THE PAST 12 MONTHS, HAS THE ELECTRIC, GAS, OIL, OR WATER COMPANY THREATENED TO SHUT OFF SERVICE IN YOUR HOME?: NO
WITHIN THE LAST YEAR, HAVE YOU BEEN HUMILIATED OR EMOTIONALLY ABUSED IN OTHER WAYS BY YOUR PARTNER OR EX-PARTNER?: PATIENT DECLINED

## 2024-12-04 ASSESSMENT — FIBROSIS 4 INDEX: FIB4 SCORE: 0.88

## 2024-12-04 NOTE — ASSESSMENT & PLAN NOTE
Lipase is elevated to 173  Patient is asymptomatic, I do not feel there is acute pancreatitis  No additional workup

## 2024-12-04 NOTE — ED NOTES
Bedside report received from off going RN/tech: Nickie, assumed care of patient.  POC discussed with patient. Call light within reach, all needs addressed at this time.       Fall risk interventions in place: Not Applicable (all applicable per [facility] Fall risk assessment)   Continuous monitoring: Cardiac Leads, Pulse Ox, or Blood Pressure  IVF/IV medications: Not Applicable   Oxygen: Room Air  Bedside sitter: Not Applicable   Isolation: Not Applicable

## 2024-12-04 NOTE — ED NOTES
Pt ambulatory to bathroom with steady gait. Urine sample sent. Pt placed on monitor and placed 20 g IV r ac.  Pt blood sent

## 2024-12-04 NOTE — ANESTHESIA PREPROCEDURE EVALUATION
Case: [id number] Date/Time: 12/04/24 1545    Procedures:       CYSTOSCOPY, WITH URETERAL STENT INSERTION (Right)      URETEROSCOPY (Right)    Location: [facility] OR [facility] / SURGERY [facility]    Surgeons: Timur Portillo M.D.            Relevant Problems   ANESTHESIA   (positive) JASPER on CPAP      CARDIAC   (positive) Essential hypertension   (positive) Murmur         (positive) Diabetic nephropathy associated with diabetes mellitus due to underlying condition (HCC)   (positive) Microalbuminuria due to type 2 diabetes mellitus (HCC)      ENDO   (positive) Dyslipidemia associated with type 2 diabetes mellitus (HCC)   (positive) Type 2 diabetes mellitus with hyperglycemia, with long-term current use of insulin (HCC)      Other   (positive) Severe obesity (BMI 35.0-39.9) with comorbidity (HCC)   EKG with old Inf infarct, poss anterior infarct.  NST 2021 normal. Recent echo normal, no sx    Physical Exam    Airway   Mallampati: II  TM distance: >3 FB  Neck ROM: full       Cardiovascular - normal exam  Rhythm: regular  Rate: normal  (-) murmur     Dental - normal exam           Pulmonary - normal exam  Breath sounds clear to auscultation     Abdominal    Neurological - normal exam                   Anesthesia Plan    ASA 2- EMERGENT   ASA physical status emergent criteria: acute deteriorating condition due to infection    Plan - general       Airway plan will be LMA          Induction: intravenous    Postoperative Plan: Postoperative administration of opioids is intended.    Pertinent diagnostic labs and testing reviewed    Informed Consent:    Anesthetic plan and risks discussed with patient.    Use of blood products discussed with: patient whom consented to blood products.

## 2024-12-04 NOTE — ASSESSMENT & PLAN NOTE
Likely reactive with ureterolithiasis  Abnormal urinalysis, urology recommending antibiotics which have been initiated

## 2024-12-04 NOTE — ED TRIAGE NOTES
"Chief Complaint   Patient presents with    Flank Pain     Pt reports flank pain and RLQ pain since 1AM this morning with associated N/V.  Pt states she has a hx of kidney stones.      Abdominal Pain    N/V       Patient to triage ambulatory with a steady gait, AAOx4, Appropriate precautions in place.     Explained wait time and triage process. Placed in phlebotomy waiting. Told to notify ED tech or RN of any changes, verbalized understanding.    BP (!) 180/80   Pulse 96   Temp 36 °C (96.8 °F) (Temporal)   Resp 18   Ht 1.6 m (5' 3\")   Wt 96.8 kg (213 lb 6.5 oz)   LMP 01/01/2009   SpO2 91%   BMI 37.80 kg/m²     "

## 2024-12-04 NOTE — ED NOTES
Pharmacy Medication Reconciliation      ~Medication reconciliation updated and complete per patient at bedside   ~Allergies have been verified and updated   ~No oral ABX within the last 30 days  ~Patient home pharmacy :  [facility]      ~Anticoagulants (rivaroxaban, apixaban, edoxaban, dabigatran, warfarin, enoxaparin) taken in the last 14 days? No

## 2024-12-04 NOTE — ED NOTES
IV antibiotics running. Patient medicated for pain. Patient reports she wears C Pap at home at night. Patient placed on 2L NC while sleeping.

## 2024-12-04 NOTE — H&P
[facility] History & Physical Note    Date of Service  12/4/2024    Primary Care Physician  Link Polanco III, M.D.    Consultants  urology    Specialist Names: Dr Portillo    Code Status  Full Code    Chief Complaint  Chief Complaint   Patient presents with    Flank Pain     Pt reports flank pain and RLQ pain since 1AM this morning with associated N/V.  Pt states she has a hx of kidney stones.      Abdominal Pain    N/V       History of Presenting Illness  Zena Hui is a 69 y.o. female who presented 12/4/2024 with right-sided flank pain with radiation to the right groin.  Patient states she was in her usual state of health whenever she went to bed, awoke around 1 this morning to go to the bathroom.  Patient then began experiencing right sided flank pain that was sharp and moderate in severity.  Patient states the pain then seemed to come around toward the front and goes toward her groin becoming worse in severity.  Patient denied any nausea or vomiting, no fever or chills.  She denied any previous urinary tract infection symptoms.  Patient does have a history of kidney stones, did feel that is what it was once the pain worsened so she presented to the emergency department.  Upon arrival here, she did have a CT scan that did show ureterolithiasis.  ERP did discuss the case with urology.  I did discuss the case including labs with the ER physician as well as the urology team.    I discussed the plan of care with patient.    Review of Systems  Review of Systems   Constitutional:  Negative for chills, fever and malaise/fatigue.   HENT:  Negative for congestion.    Respiratory:  Negative for cough, sputum production, shortness of breath and stridor.    Cardiovascular:  Negative for chest pain, palpitations and leg swelling.   Gastrointestinal:  Positive for abdominal pain and constipation. Negative for diarrhea, nausea and vomiting.   Genitourinary:  Positive for flank pain. Negative for dysuria and  urgency.   Musculoskeletal:  Negative for falls and myalgias.   Neurological:  Negative for dizziness, tingling, loss of consciousness, weakness and headaches.   Psychiatric/Behavioral:  Negative for depression and suicidal ideas.    All other systems reviewed and are negative.      Past Medical History   has a past medical history of Arthritis (2021), Blood clotting disorder (HCC), Bronchitis, Essential hypertension, benign, High cholesterol, Hyperlipidemia, Mixed hyperlipidemia, Organic sleep apnea, unspecified, Osteopenia, Renal disorder (04/2019), Sleep apnea, Type 2 diabetes mellitus with microalbuminuria, with long-term current use of insulin ([facility]) (02/28/2023), and Type II or unspecified type diabetes mellitus without mention of complication, not stated as uncontrolled.    Surgical History   has a past surgical history that includes embolectomy (1998); colonoscopy with polyp (10/2007); cystoscopy (N/A, 07/05/2019); ureteroscopy (Right, 07/05/2019); lasertripsy (Right, 07/05/2019); stent placement (Right, 07/05/2019); other orthopedic surgery (N/A, 04/1998); other (07/2019); pr cystoscopy,insert ureteral stent (Bilateral, 07/05/2021); pr cysto/uretero/pyeloscopy, dx (Bilateral, 07/05/2021); lasertripsy (Bilateral, 07/05/2021); and laminotomy.     Family History  family history includes Arthritis in her mother; Cancer in her brother and maternal grandmother; Heart Disease in her brother and father; Hyperlipidemia in her father and mother; Hypertension in her mother; Lung Disease in her mother.   Family history reviewed with patient. There is no family history that is pertinent to the chief complaint.     Social History   reports that she has never smoked. She has never used smokeless tobacco. She reports that she does not drink alcohol and does not use drugs.    Allergies  No Known Allergies    Medications  Prior to Admission Medications   Prescriptions Last Dose Informant Patient Reported? Taking?   B Complex  Vitamins (VITAMIN B COMPLEX PO) 12/3/2024 Morning Patient Yes Yes   Sig: Take 1 Tablet by mouth every morning. Once daily   Continuous Blood Gluc Sensor (FREESTYLE ETELVINA 2 SENSOR) [facility] [place] 12/3/2024 Morning Patient No Yes   Sig: USE 1 EACH EVERY 14 DAYS.   Insulin Lispro-aabc, 1 U Dial, (LYUMJEELISE KWIKPEN) 100 UNIT/ML Solution Pen-injector 12/2/2024 Patient No No   Sig: Inject 12 Units under the skin 3 times a day before meals. 90 day   Multiple Vitamin (MULTIVITAMIN ADULT PO) 12/3/2024 Morning Patient Yes Yes   Sig: Take 1 Tablet by mouth every morning.   Semaglutide, 1 MG/DOSE, (OZEMPIC, 1 MG/DOSE,) 4 MG/3ML Solution Pen-injector 12/1/2024 Evening Patient No No   Sig: Inject 1 mg under the skin every 7 days.   TOUJEO SOLOSTAR 300 UNIT/ML Solution Pen-injector 12/3/2024 Evening Patient No Yes   Sig: INJECT 50 UNITS UNDER THE SKIN  DAILY   Patient taking differently: Inject 45 Units under the skin every evening.   VITAMIN D PO 12/3/2024 Morning Patient Yes Yes   Sig: Take 1 Tablet by mouth every morning. Once dialy   amlodipine-valsartan (EXFORGE)  MG per tablet 12/3/2024 Evening Patient No Yes   Sig: Take 1 Tablet by mouth every day.   aspirin 81 MG tablet  Patient Yes No   Sig: Take 81 mg by mouth every day.   glimepiride (AMARYL) 4 MG Tab 12/3/2024 Evening Patient No Yes   Sig: TAKE 1 TABLET BY MOUTH TWICE A DAY   metformin (GLUCOPHAGE) 1000 MG tablet 12/3/2024 Evening Patient No Yes   Sig: TAKE 1 TABLET BY MOUTH TWICE A DAY WITH FOOD   rosuvastatin (CRESTOR) 20 MG Tab 12/3/2024 Evening Patient No Yes   Sig: Take 1 Tablet by mouth every day.      Facility-Administered Medications: None       Physical Exam  Temp:  [36 °C (96.8 °F)] 36 °C (96.8 °F)  Pulse:  [76-96] 81  Resp:  [16-18] 17  BP: (161-184)/(71-81) 165/73  SpO2:  [91 %-94 %] 94 %  Blood Pressure : (!) 165/73   Temperature: 36 °C (96.8 °F)   Pulse: 81   Respiration: 17   Pulse Oximetry: 94 %       Physical Exam  Vitals and nursing note reviewed.    Constitutional:       General: She is not in acute distress.     Appearance: She is well-developed. She is obese. She is not diaphoretic.   HENT:      Head: Normocephalic and atraumatic.      Right Ear: External ear normal.      Left Ear: External ear normal.      Nose: Nose normal. No congestion or rhinorrhea.      Mouth/Throat:      Mouth: Mucous membranes are dry.      Pharynx: No oropharyngeal exudate.   Eyes:      General:         Right eye: No discharge.         Left eye: No discharge.   Neck:      Trachea: No tracheal deviation.   Cardiovascular:      Rate and Rhythm: Normal rate and regular rhythm.      Heart sounds: Murmur heard.      No friction rub. No gallop.   Pulmonary:      Effort: Pulmonary effort is normal. No respiratory distress.      Breath sounds: Normal breath sounds. No stridor. No wheezing or rales.   Chest:      Chest wall: No tenderness.   Abdominal:      General: Bowel sounds are normal. There is no distension.      Palpations: Abdomen is soft.      Tenderness: There is abdominal tenderness in the right lower quadrant. There is right CVA tenderness.   Musculoskeletal:         General: No tenderness. Normal range of motion.      Cervical back: Neck supple.      Right lower leg: No edema.      Left lower leg: No edema.   Lymphadenopathy:      Cervical: No cervical adenopathy.   Skin:     General: Skin is warm and dry.      Findings: No erythema or rash.   Neurological:      Mental Status: She is alert and oriented to person, place, and time.      Cranial Nerves: No cranial nerve deficit.   Psychiatric:         Mood and Affect: Mood normal.         Behavior: Behavior normal.         Thought Content: Thought content normal.         Judgment: Judgment normal.         Laboratory:  Recent Labs     12/04/24  0622   WBC 15.7*   RBC 5.25   HEMOGLOBIN 14.3   HEMATOCRIT 43.8   MCV 83.4   MCH 27.2   MCHC 32.6   RDW 43.9   PLATELETCT 342   MPV 9.4     Recent Labs     12/04/24  0622   SODIUM 140  "  POTASSIUM 3.6   CHLORIDE 102   CO2 22   GLUCOSE 290*   BUN 15   CREATININE 0.68   CALCIUM 9.2     Recent Labs     12/04/24  0622   ALTSGPT 21   ASTSGOT 20   ALKPHOSPHAT 138*   TBILIRUBIN 0.7   LIPASE 179*   GLUCOSE 290*         No results for input(s): \"NTPROBNP\" in the last 72 hours.      No results for input(s): \"TROPONINT\" in the last 72 hours.    Imaging:  CT-RENAL COLIC EVALUATION(A/P W/O)   Final Result         1.  Bilateral nephrolithiasis with 5.7 mm right ureteropelvic junction stone   2.  Atherosclerosis and atherosclerotic coronary artery disease   3.  Hepatomegaly   4.  Diverticulosis   5.  Irregular hepatic contour, appearance favoring changes of cirrhosis          EKG:  I have personally reviewed the images and compared with prior images.    Assessment/Plan:  Justification for Admission Status  I anticipate this patient is appropriate for observation status at this time because ureterolithiasis    Patient will need a Med/Surg bed on SURGICAL service .  The need is secondary to ureterolithiasis.    * Ureterolithiasis- (present on admission)  Assessment & Plan  Patient does have bilateral nephrolithiasis however there is a 5.7 mm right ureteropelvic junction stone  Causing significant pain  I have discussed the case with urology, plan is for cystoscopy, right ureteroscopy with JJ stent placement  Keep patient n.p.o.  Start as needed narcotics as well as as needed Toradol  Start IV fluids  Additional recommendations per urology    Abnormal urinalysis- (present on admission)  Assessment & Plan  Mild abnormality with epithelial cells present  No leuk esterase or nitrate  Urology is recommending treatment since there is a retained stone and stent will be placed  Start Ancef  Await culture result    Elevated lipase- (present on admission)  Assessment & Plan  Lipase is elevated to 173  Patient is asymptomatic, I do not feel there is acute pancreatitis  No additional workup    Leukocytosis- (present on " admission)  Assessment & Plan  Likely reactive with ureterolithiasis  Abnormal urinalysis, urology recommending antibiotics which have been initiated    Dyslipidemia associated with type 2 diabetes mellitus (HCC)- (present on admission)  Assessment & Plan  Continue home statin    Severe obesity (BMI 35.0-39.9) with comorbidity ([facility])- (present on admission)  Assessment & Plan  Body mass index is 37.92 kg/m².  Patient would benefit from diet and exercise adjustment    Type 2 diabetes mellitus with hyperglycemia, with long-term current use of insulin ([facility])- (present on admission)  Assessment & Plan  Hold home glimepiride, insulin, metformin, semaglutide, Toujeo  Start insulin sliding scale  Glucose is currently 290, concerned for potential for hypoglycemia considering she will be n.p.o., will avoid long-acting insulin at this time  Adjust as needed    Essential hypertension- (present on admission)  Assessment & Plan  Continue home amlodipine and valsartan  Start as needed labetalol   adjust as needed        VTE prophylaxis: SCDs/TEDs

## 2024-12-04 NOTE — ED PROVIDER NOTES
ED Provider Note    CHIEF COMPLAINT  Chief Complaint   Patient presents with    Flank Pain     Pt reports flank pain and RLQ pain since 1AM this morning with associated N/V.  Pt states she has a hx of kidney stones.      Abdominal Pain    N/V       EXTERNAL RECORDS REVIEWED  Other reviewed an office note from the patient's cardiologist when she presented with an abnormal EKG as well as with hypertension.  It was noted the patient also has obstructive sleep apnea on CPAP as well as type 2 diabetes on insulin.    HPI/ROS      Zena Hui is a 69 y.o. female who presents with right flank pain.  The patient states she has had the pain since 1:00 this morning.  She describes it as sharp with radiation into her groin.  She has had some fevers and chills.  She has not had any dysuria nor hematuria.  She states the pain is sharp and she says she has a history of kidney stones with similar discomfort.  She has not had any prior abdominal surgeries.  She is unaware of any sick contacts.    PAST MEDICAL HISTORY   has a past medical history of Arthritis (2021), Blood clotting disorder ([facility]), Bronchitis, Essential hypertension, benign, High cholesterol, Hyperlipidemia, Mixed hyperlipidemia, Organic sleep apnea, unspecified, Osteopenia, Renal disorder (04/2019), Sleep apnea, Type 2 diabetes mellitus with microalbuminuria, with long-term current use of insulin ([facility]) (02/28/2023), and Type II or unspecified type diabetes mellitus without mention of complication, not stated as uncontrolled.    SURGICAL HISTORY   has a past surgical history that includes embolectomy (1998); colonoscopy with polyp (10/2007); cystoscopy (N/A, 07/05/2019); ureteroscopy (Right, 07/05/2019); lasertripsy (Right, 07/05/2019); stent placement (Right, 07/05/2019); other orthopedic surgery (N/A, 04/1998); other (07/2019); cystoscopy,insert ureteral stent (Bilateral, 07/05/2021); cysto/uretero/pyeloscopy, dx (Bilateral, 07/05/2021); lasertripsy  "(Bilateral, 2021); and laminotomy.    FAMILY HISTORY  Family History   Problem Relation Age of Onset    Arthritis Mother     Lung Disease Mother         COPD    Hypertension Mother     Hyperlipidemia Mother     Heart Disease Father          @ 41 from heart attack    Hyperlipidemia Father     Heart Disease Brother         CAD   S/P CABG    Cancer Maternal Grandmother         Pancreatic cancer    Cancer Brother         Prostate cancer       SOCIAL HISTORY  Social History     Tobacco Use    Smoking status: Never    Smokeless tobacco: Never   Vaping Use    Vaping status: Never Used   Substance and Sexual Activity    Alcohol use: Never    Drug use: Never    Sexual activity: Not Currently     Partners: Male     Birth control/protection: Post-Menopausal       CURRENT MEDICATIONS  Home Medications       Reviewed by Madhuri Colin R.N. (Registered Nurse) on 24 at 0615  Med List Status: Partial     Medication Last Dose Status   amlodipine-valsartan (EXFORGE)  MG per tablet  Active   aspirin 81 MG tablet  Active   B Complex Vitamins (VITAMIN B COMPLEX PO)  Active   Continuous Blood Gluc Sensor (FREESTYLE ETELVINA 2 SENSOR) Misc  Active   glimepiride (AMARYL) 4 MG Tab  Active   Insulin Lispro-aabc, 1 U Dial, (LYUMJEV KWIKPEN) 100 UNIT/ML Solution Pen-injector  Active   metformin (GLUCOPHAGE) 1000 MG tablet  Active   Multiple Vitamin (MULTIVITAMIN ADULT PO)  Active   rosuvastatin (CRESTOR) 20 MG Tab  Active   Semaglutide, 1 MG/DOSE, (OZEMPIC, 1 MG/DOSE,) 4 MG/3ML Solution Pen-injector  Active   TOUJEO SOLOSTAR 300 UNIT/ML Solution Pen-injector  Active   VITAMIN D PO  Active                    ALLERGIES  Allergies   Allergen Reactions    Amoxicillin Vomiting     Per patient happened only 1 time.        PHYSICAL EXAM  VITAL SIGNS: BP (!) 180/80   Pulse 96   Temp 36 °C (96.8 °F) (Temporal)   Resp 18   Ht 1.6 m (5' 3\")   Wt 96.8 kg (213 lb 6.5 oz)   LMP 2009   SpO2 91%   BMI 37.80 kg/m²    In " general the patient appears uncomfortable but nontoxic    HEENT unremarkable    Pulmonary the patient's lungs are clear to auscultation bilaterally    Cardiovascular S1-S2 with a regular rate and rhythm    GI abdomen soft with no distention and good bowel sounds    Skin no rashes, pallor, no jaundice    Extremities no distal edema    Neurologic examination is grossly intact    EKG/LABS  Results for orders placed or performed during the hospital encounter of 12/04/24   CBC with Differential    Collection Time: 12/04/24  6:22 AM   Result Value Ref Range    WBC 15.7 (H) 4.8 - 10.8 K/uL    RBC 5.25 4.20 - 5.40 M/uL    Hemoglobin 14.3 12.0 - 16.0 g/dL    Hematocrit 43.8 37.0 - 47.0 %    MCV 83.4 81.4 - 97.8 fL    MCH 27.2 27.0 - 33.0 pg    MCHC 32.6 32.2 - 35.5 g/dL    RDW 43.9 35.9 - 50.0 fL    Platelet Count 342 164 - 446 K/uL    MPV 9.4 9.0 - 12.9 fL    Neutrophils-Polys 83.40 (H) 44.00 - 72.00 %    Lymphocytes 10.60 (L) 22.00 - 41.00 %    Monocytes 4.20 0.00 - 13.40 %    Eosinophils 1.00 0.00 - 6.90 %    Basophils 0.40 0.00 - 1.80 %    Immature Granulocytes 0.40 0.00 - 0.90 %    Nucleated RBC 0.00 0.00 - 0.20 /100 WBC    Neutrophils (Absolute) 13.07 (H) 1.82 - 7.42 K/uL    Lymphs (Absolute) 1.66 1.00 - 4.80 K/uL    Monos (Absolute) 0.66 0.00 - 0.85 K/uL    Eos (Absolute) 0.16 0.00 - 0.51 K/uL    Baso (Absolute) 0.07 0.00 - 0.12 K/uL    Immature Granulocytes (abs) 0.06 0.00 - 0.11 K/uL    NRBC (Absolute) 0.00 K/uL   Complete Metabolic Panel    Collection Time: 12/04/24  6:22 AM   Result Value Ref Range    Sodium 140 135 - 145 mmol/L    Potassium 3.6 3.6 - 5.5 mmol/L    Chloride 102 96 - 112 mmol/L    Co2 22 20 - 33 mmol/L    Anion Gap 16.0 7.0 - 16.0    Glucose 290 (H) 65 - 99 mg/dL    Bun 15 8 - 22 mg/dL    Creatinine 0.68 0.50 - 1.40 mg/dL    Calcium 9.2 8.5 - 10.5 mg/dL    Correct Calcium 8.8 8.5 - 10.5 mg/dL    AST(SGOT) 20 12 - 45 U/L    ALT(SGPT) 21 2 - 50 U/L    Alkaline Phosphatase 138 (H) 30 - 99 U/L    Total  Bilirubin 0.7 0.1 - 1.5 mg/dL    Albumin 4.5 3.2 - 4.9 g/dL    Total Protein 7.4 6.0 - 8.2 g/dL    Globulin 2.9 1.9 - 3.5 g/dL    A-G Ratio 1.6 g/dL   Lipase    Collection Time: 12/04/24  6:22 AM   Result Value Ref Range    Lipase 179 (H) 11 - 82 U/L   ESTIMATED GFR    Collection Time: 12/04/24  6:22 AM   Result Value Ref Range    GFR (CKD-EPI) 94 >60 mL/min/1.73 m 2   Urinalysis    Collection Time: 12/04/24  6:40 AM    Specimen: Urine, Clean Catch; Blood   Result Value Ref Range    Color Yellow     Character Clear     Specific Gravity 1.025 <1.035    Ph 5.5 5.0 - 8.0    Glucose >=1000 (A) Negative mg/dL    Ketones 15 (A) Negative mg/dL    Protein 300 (A) Negative mg/dL    Bilirubin Negative Negative    Urobilinogen, Urine 0.2 <=1.0 EU/dL    Nitrite Negative Negative    Leukocyte Esterase Negative Negative    Occult Blood Small (A) Negative    Micro Urine Req Microscopic    URINE MICROSCOPIC (W/UA)    Collection Time: 12/04/24  6:40 AM   Result Value Ref Range    WBC 0-2 /hpf    RBC 3-5 (A) 0 - 2 /hpf    Bacteria Few (A) None /hpf    Epithelial Cells 0-2 0 - 5 /hpf    Epithelial Cells Renal Present (A) Absent /hpf    Amorphous Crystal Present (A) Absent /hpf    Urine Casts 0-2 0 - 2 /lpf   POCT glucose device results    Collection Time: 12/04/24  6:46 AM   Result Value Ref Range    POC Glucose, Blood 286 (H) 65 - 99 mg/dL     *Note: Due to a large number of results and/or encounters for the requested time period, some results have not been displayed. A complete set of results can be found in Results Review.       I have independently interpreted this EKG    RADIOLOGY/PROCEDURES     Radiologist interpretation:  CT-RENAL COLIC EVALUATION(A/P W/O)   Final Result         1.  Bilateral nephrolithiasis with 5.7 mm right ureteropelvic junction stone   2.  Atherosclerosis and atherosclerotic coronary artery disease   3.  Hepatomegaly   4.  Diverticulosis   5.  Irregular hepatic contour, appearance favoring changes of  cirrhosis          COURSE & MEDICAL DECISION MAKING    This is 69-year-old female who presents with right flank discomfort.  CT scan does show a 5.7 mm stone in the right ureter pelvic junction.  The patient has had some subjective fevers and chills as well as malaise.  Therefore I am concerned this may be infected.  I did order Ancef for antibiotic coverage.  I also spoke with urology.  The patient did respond to IV fluids, Zofran, and fentanyl.  Clinically she does not appear septic.  She will be admitted to the hospitalist with urology in consultation.    FINAL DIAGNOSIS  1.  Right 5.7 mm ureteropelvic junction ureterolith  2.  UTI    Disposition  The patient will be admitted in stable condition     Electronically signed by: Gal Howe M.D., 12/4/2024 6:51 AM

## 2024-12-04 NOTE — ANESTHESIA PROCEDURE NOTES
Airway    Date/Time: 12/4/2024 2:11 PM    Performed by: Valdemar Bacon M.D.  Authorized by: Valdemar Bacon M.D.    Location:  OR  Urgency:  Elective  Indications for Airway Management:  Anesthesia      Spontaneous Ventilation: absent    Sedation Level:  Deep  Preoxygenated: Yes    Mask Difficulty Assessment:  0 - not attempted  Final Airway Type:  Supraglottic airway  Final Supraglottic Airway:  Standard LMA    SGA Size:  4  Number of Attempts at Approach:  1

## 2024-12-04 NOTE — ED NOTES
Bedside report received from DEAN Dhaliwal.   Fall risk precautions in place. Call bell in reach.  Pt on 2L O2, all lines traced. NS running IV at 125mL/hr.    POC discussed with pt.

## 2024-12-04 NOTE — PROGRESS NOTES
2 RN Skin Assessment Completed by Kesha RN and Heri RN.     Head: WDL  Ears: WDL  Nose: WDL  Mouth: WDL  Neck: WDL  Breasts/Chest: WDL  Shoulder Blades: WDL  Spine: WDL  (R) Arm/Elbow/hand: WDL  (L) Arm/Elbow/hand: Device associated pressure area from tourniquet. See image taken 1 hour after device was removed.   Abdomen:WDL  Groin: WDL  Sacrum/Coccyx/Buttocks: blanching  (R) Leg: WDL  (L) Leg: WDL  (R) Heel/Foot/Toe: blanching  (L) Heel/Foot/Toe: blanching              Devices in place: BP Cuff and Pulse Ox     Interventions in place: Gray ear foams and Pillows     Possible skin injury found: Yes     Pictures uploaded into Epic: Yes  Wound Consult Placed: Yes

## 2024-12-04 NOTE — ANESTHESIA TIME REPORT
Anesthesia Start and Stop Event Times       Date Time Event    12/4/2024 1301 Ready for Procedure     1405 Anesthesia Start     1435 Anesthesia Stop          Responsible Staff  12/04/24      Name Role Begin Luis Bacon M.D. Anesth 1409 1430          Overtime Reason:  no overtime (within assigned shift)    Comments:

## 2024-12-04 NOTE — OR NURSING
1433- Pt arrives to PACU from OR on 8L of oxygen via mask. Report received. Pt resting comfortably.     1500- OPA removed, pt denies pain and nausea at this time, tolerating sips of water.    1503- Pt  Bill called and updated on POC and pt status.    1515- Report called to Kesha MORAN    1525- Pt taken to room, pt comfortable upon leaving PACU.

## 2024-12-04 NOTE — PROGRESS NOTES
Pt arrived to unit via stretcher at 1111. Pt oriented to unit and plan of care. Patient placed in hallway while waiting on a clean room. All questions answered at this time. Call light within reach; fall precautions in place.

## 2024-12-04 NOTE — CONSULTS
[facility] [place] Consult/H&P Note    Patient's Name/MRN: Zena Hui, [id number]   Room #: RD 01/01 RED    Admit Date:12/4/2024  Today's Date: 12/4/2024   Length of stay:  LOS: 0 days      Reason for consult/chief complaint: UTI, obstructing stone   ID/HPI: Zena Hui is a 69 y.o. female patient who p/w severe 8/10 flank pain on rifght side. She has had +N, +V, +F, +C.  She has had multiple episode of stones. Has had prior stone procedures in past.  In ER, WBC was 18, UA was c/w infection, and Cr was normal.  CT was doen showing multiple stones and 6 mm obstructing  stone in the UPJ.       Past Medical History:   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    Bronchitis     Essential hypertension, benign     High cholesterol     Hyperlipidemia     Mixed hyperlipidemia     Organic sleep apnea, unspecified     Osteopenia     Renal disorder 04/2019    kidney stones    Sleep apnea     Type 2 diabetes mellitus with microalbuminuria, with long-term current use of insulin ([facility]) 02/28/2023    Type II or unspecified type diabetes mellitus without mention of complication, not stated as uncontrolled     oral meds        Past Surgical History:   Past Surgical History:   Procedure Laterality Date    KS CYSTOSCOPY,INSERT URETERAL STENT Bilateral 07/05/2021    Procedure: CYSTOSCOPY, WITH URETERAL STENT INSERTION - POSSIBLE STENT;  Surgeon: Italo Fair M.D.;  Location: [facility];  Service: Urology    KS CYSTO/URETERO/PYELOSCOPY, DX Bilateral 07/05/2021    Procedure: URETEROSCOPY;  Surgeon: Italo Fair M.D.;  Location: [facility];  Service: Urology    LASERTRIPSY Bilateral 07/05/2021    Procedure: LITHOTRIPSY, USING LASER.;  Surgeon: Italo Fair M.D.;  Location: [facility];  Service: Urology    CYSTOSCOPY N/A 07/05/2019    Procedure: CYSTOSCOPY;  Surgeon: Espinoza Orr M.D.;  Location: [facility]  "ORS;  Service: Urology    URETEROSCOPY Right 2019    Procedure: URETEROSCOPY;  Surgeon: Espinoza Orr M.D.;  Location: SURGERY [facility];  Service: Urology    LASERTRIPSY Right 2019    Procedure: LITHOTRIPSY, USING LASER;  Surgeon: Espinoza Orr M.D.;  Location: SURGERY [facility];  Service: Urology    STENT PLACEMENT Right 2019    Procedure: STENT PLACEMENT;  Surgeon: Espinoza Orr M.D.;  Location: SURGERY [facility];  Service: Urology    OTHER  2019    lithotripsy    COLONOSCOPY WITH POLYP  10/2007    tubular adenoma    OTHER ORTHOPEDIC SURGERY N/A 1998    laminectomy    EMBOLECTOMY      remove blood clot from spinal cord    LAMINOTOMY          Family History:   Family History   Problem Relation Age of Onset    Arthritis Mother     Lung Disease Mother         COPD    Hypertension Mother     Hyperlipidemia Mother     Heart Disease Father          @ 41 from heart attack    Hyperlipidemia Father     Heart Disease Brother         CAD   S/P CABG    Cancer Maternal Grandmother         Pancreatic cancer    Cancer Brother         Prostate cancer         Social History:   Social History     Tobacco Use    Smoking status: Never    Smokeless tobacco: Never   Vaping Use    Vaping status: Never Used   Substance Use Topics    Alcohol use: Never    Drug use: Never      Social History     Social History Narrative    Not on file        Allergies: she Amoxicillin    Medications:   (Not in a hospital admission)        Review of Systems  Review of Systems   Constitutional:  Positive for fever.        Physical Exam  VITAL SIGNS: BP (!) 184/81   Pulse 78   Temp 36 °C (96.8 °F) (Temporal)   Resp 16   Ht 1.6 m (5' 3\")   Wt 96.8 kg (213 lb 6.5 oz)   LMP 2009   SpO2 91%   BMI 37.80 kg/m²   GENERAL:  alert, in no acute distress  EYES: EOMI and normal accomodation  Neck: Supple  BACK: No CVA tenderness.   CHEST AND LUNGS: good air entry, no audible " "wheezes  CARDIOVASCULAR: Rate is regular, no peripheral edema.   ABDOMEN: Abdomen soft, nontender. No masses, organomegaly.  : + CVAT   EXTREMITIES: Warm and well perfused without c/c/e  NEURO: No focal deficits  SKIN: Skin color, texture, turgor normal. No rashes, lesions, nor jaundice.      Labs:  Recent Labs     12/04/24 0622   WBC 15.7*   RBC 5.25   HEMOGLOBIN 14.3   HEMATOCRIT 43.8   MCV 83.4   MCH 27.2   MCHC 32.6   RDW 43.9   PLATELETCT 342   MPV 9.4     Recent Labs     12/04/24  0622   SODIUM 140   POTASSIUM 3.6   CHLORIDE 102   CO2 22   GLUCOSE 290*   BUN 15   CREATININE 0.68   CALCIUM 9.2         Glucose:  Lab Results   Component Value Date/Time    GLUCOSE 290 (H) 12/04/2024 06:22 AM    GLUCOSE 125 (H) 03/15/2024 06:25 AM    GLUCOSE 195 (H) 02/29/2024 07:54 AM    GLUCOSE 164 (H) 02/06/2024 08:29 AM     Coags:  No results found for: \"INR\"      Urinalysis:   Lab Results   Component Value Date/Time    COLORURINE Yellow 12/04/2024 06:40 AM    CLARITY Clear 12/04/2024 06:40 AM    SPECGRAVITY 1.025 12/04/2024 06:40 AM    PHURINE 5.5 12/04/2024 06:40 AM    GLUCOSEUR >=1000 (A) 12/04/2024 06:40 AM    KETONES 15 (A) 12/04/2024 06:40 AM    NITRITE Negative 12/04/2024 06:40 AM    OCCULTBLOOD Small (A) 12/04/2024 06:40 AM    RBCURINE 3-5 (A) 12/04/2024 06:40 AM    BACTERIA Few (A) 12/04/2024 06:40 AM    EPITHELCELL 0-2 12/04/2024 06:40 AM       Imaging:                                                     Assessment/Recommendation   69 y.o.F with obstructing R ureteral stone. Due to the possible infection that she has currently, it is recommended to place a stent today, and have secondary definitive stone removal when pt is more stable and infection is treated. She understands the risk of inability to access ureter, the need for second procedures, the possibility of negative ureteroscopy, that she may have stent discomfort until this is removed, bleeding, infection, ureteral injury or stricture, bladder injury, post " op urinary retention requiring espinoza catheter, and the general pulmonary and cardiovascular risks associated with anesthesia.  After a full discussion of the alternatives risks and benefits of the procedure, the patient consented to proceeding with the planned procedure.     Consent obatined  NPO for Add on for Cystoscopy, RIGHT ureteroscopy, and JJ stent  Urology to follow     Dr Fong is aware of this consult and has directed this plan of care        ANTONI Lackey-C.   [address] [place] [address]   [phone]

## 2024-12-04 NOTE — DISCHARGE PLANNING
[facility] TCN chart review completed.  Due to low LACE 48 as well as patients currently documented level of function of ambulatory with no AD with steady gait, no TCN needs anticipated at this time. Note that pt is currently in ED, though appears will likely be admitted for urology consultation. Note pt with hx of recurrent kidney stones per review. Should post acute discharge needs arise warranting TCN involvement, please contact TCN team via Voalte. Thank you.

## 2024-12-04 NOTE — ASSESSMENT & PLAN NOTE
Mild abnormality with epithelial cells present  No leuk esterase or nitrate  Urology is recommending treatment since there is a retained stone and stent will be placed  Start Ancef  Await culture result

## 2024-12-04 NOTE — OP REPORT
OPERATIVE NOTE:      Date: 12/4/2024    Patient ID:   Name:             Zena Hui   YOB: 1955  Age:                 69 y.o.  female   MRN:               [id number]                                                                         PRE-OP DIAGNOSIS: 5mm proximal right ureteral stone with infection        POST-OP DIAGNOSIS: same            PROCEDURE: Procedure(s) and Anesthesia Type:     * CYSTOSCOPY, WITH URETERAL STENT INSERTION - General            SURGEON: Surgeons and Role:     * Timur Portillo M.D. - Primary     ASSIST: none            ANESTHESIA: GET            ESTIMATED BLOOD LOSS: none            DRAINS: 24cm 6F right ureteral stent                  SPECIMENS: none               COMPLICATIONS: none                   CONDITION: stable            TECHNIQUE: Patient is brought to the operating room. Given general anesthesia. Placed in lithotomy position. Prepped and draped in sterile fashion. Cystoscopy is performed. Normal urethra and bladder are identified. right ureteral orifice is identified. Orifice is cannulated and contrast used to opacify the collecting system. A guide wire is navigated to the renal pelvis. Over this a 24 cm, 6 Surinamese double pigtail is passed. It coils proximally in the renal pelvis and distally in the bladder. This completes the procedure. Patient is sent to recovery in stable condition.

## 2024-12-04 NOTE — ASSESSMENT & PLAN NOTE
Hold home glimepiride, insulin, metformin, semaglutide, Toujeo  Start insulin sliding scale  Glucose is currently 290, concerned for potential for hypoglycemia considering she will be n.p.o., will avoid long-acting insulin at this time  Adjust as needed

## 2024-12-05 ENCOUNTER — PHARMACY VISIT (OUTPATIENT)
Dept: PHARMACY | Facility: MEDICAL CENTER | Age: 69
End: 2024-12-05
Payer: COMMERCIAL

## 2024-12-05 VITALS
SYSTOLIC BLOOD PRESSURE: 134 MMHG | DIASTOLIC BLOOD PRESSURE: 63 MMHG | HEIGHT: 63 IN | WEIGHT: 214.07 LBS | BODY MASS INDEX: 37.93 KG/M2 | RESPIRATION RATE: 18 BRPM | HEART RATE: 68 BPM | TEMPERATURE: 97.9 F | OXYGEN SATURATION: 90 %

## 2024-12-05 LAB
ANION GAP SERPL CALC-SCNC: 14 MMOL/L (ref 7–16)
BUN SERPL-MCNC: 16 MG/DL (ref 8–22)
CALCIUM SERPL-MCNC: 9.1 MG/DL (ref 8.5–10.5)
CHLORIDE SERPL-SCNC: 105 MMOL/L (ref 96–112)
CO2 SERPL-SCNC: 22 MMOL/L (ref 20–33)
CREAT SERPL-MCNC: 0.73 MG/DL (ref 0.5–1.4)
ERYTHROCYTE [DISTWIDTH] IN BLOOD BY AUTOMATED COUNT: 45.2 FL (ref 35.9–50)
GFR SERPLBLD CREATININE-BSD FMLA CKD-EPI: 89 ML/MIN/1.73 M 2
GLUCOSE SERPL-MCNC: 265 MG/DL (ref 65–99)
HCT VFR BLD AUTO: 40.2 % (ref 37–47)
HGB BLD-MCNC: 12.8 G/DL (ref 12–16)
MCH RBC QN AUTO: 26.9 PG (ref 27–33)
MCHC RBC AUTO-ENTMCNC: 31.8 G/DL (ref 32.2–35.5)
MCV RBC AUTO: 84.6 FL (ref 81.4–97.8)
PLATELET # BLD AUTO: 308 K/UL (ref 164–446)
PMV BLD AUTO: 9.6 FL (ref 9–12.9)
POTASSIUM SERPL-SCNC: 3.9 MMOL/L (ref 3.6–5.5)
RBC # BLD AUTO: 4.75 M/UL (ref 4.2–5.4)
SODIUM SERPL-SCNC: 141 MMOL/L (ref 135–145)
WBC # BLD AUTO: 11.9 K/UL (ref 4.8–10.8)

## 2024-12-05 PROCEDURE — 80048 BASIC METABOLIC PNL TOTAL CA: CPT

## 2024-12-05 PROCEDURE — 96366 THER/PROPH/DIAG IV INF ADDON: CPT

## 2024-12-05 PROCEDURE — 85027 COMPLETE CBC AUTOMATED: CPT

## 2024-12-05 PROCEDURE — 36415 COLL VENOUS BLD VENIPUNCTURE: CPT

## 2024-12-05 PROCEDURE — G0378 HOSPITAL OBSERVATION PER HR: HCPCS

## 2024-12-05 PROCEDURE — 96372 THER/PROPH/DIAG INJ SC/IM: CPT

## 2024-12-05 PROCEDURE — 99239 HOSP IP/OBS DSCHRG MGMT >30: CPT | Performed by: INTERNAL MEDICINE

## 2024-12-05 PROCEDURE — 700105 HCHG RX REV CODE 258: Performed by: INTERNAL MEDICINE

## 2024-12-05 PROCEDURE — RXMED WILLOW AMBULATORY MEDICATION CHARGE: Performed by: INTERNAL MEDICINE

## 2024-12-05 PROCEDURE — 700111 HCHG RX REV CODE 636 W/ 250 OVERRIDE (IP): Mod: JZ,JG | Performed by: INTERNAL MEDICINE

## 2024-12-05 RX ORDER — CEPHALEXIN 500 MG/1
1000 CAPSULE ORAL 3 TIMES DAILY
Qty: 30 CAPSULE | Refills: 0 | Status: SHIPPED | OUTPATIENT
Start: 2024-12-05 | End: 2024-12-10

## 2024-12-05 RX ADMIN — CEFAZOLIN SODIUM 1 G: 1 INJECTION, SOLUTION INTRAVENOUS at 10:38

## 2024-12-05 RX ADMIN — CEFAZOLIN SODIUM 1 G: 1 INJECTION, SOLUTION INTRAVENOUS at 01:12

## 2024-12-05 RX ADMIN — INSULIN LISPRO 4 UNITS: 100 INJECTION, SOLUTION INTRAVENOUS; SUBCUTANEOUS at 01:07

## 2024-12-05 RX ADMIN — INSULIN LISPRO 4 UNITS: 100 INJECTION, SOLUTION INTRAVENOUS; SUBCUTANEOUS at 06:56

## 2024-12-05 RX ADMIN — SODIUM CHLORIDE: 9 INJECTION, SOLUTION INTRAVENOUS at 08:32

## 2024-12-05 ASSESSMENT — PAIN DESCRIPTION - PAIN TYPE: TYPE: ACUTE PAIN

## 2024-12-05 ASSESSMENT — ENCOUNTER SYMPTOMS
DIZZINESS: 0
FEVER: 0
CHILLS: 0
COUGH: 0
GASTROINTESTINAL NEGATIVE: 1
DEPRESSION: 0

## 2024-12-05 ASSESSMENT — PAIN SCALES - GENERAL: PAIN_LEVEL: 1

## 2024-12-05 NOTE — CARE PLAN
The patient is Stable - Low risk of patient condition declining or worsening    Shift Goals  Clinical Goals: IV fluids, pain control, DC  Patient Goals: DC  Family Goals: not present    Progress made toward(s) clinical / shift goals:    Problem: Pain - Standard  Goal: Alleviation of pain or a reduction in pain to the patient’s comfort goal  Description: Target End Date:  Prior to discharge or change in level of care    Document on Vitals flowsheet    1.  Document pain using the appropriate pain scale per order or unit policy  2.  Educate and implement non-pharmacologic comfort measures (i.e. relaxation, distraction, massage, cold/heat therapy, etc.)  3.  Pain management medications as ordered  4.  Reassess pain after pain med administration per policy  5.  If opiods administered assess patient's response to pain medication is appropriate per POSS sedation scale  6.  Follow pain management plan developed in collaboration with patient and interdisciplinary team (including palliative care or pain specialists if applicable)  Outcome: Progressing       Patient is not progressing towards the following goals:

## 2024-12-05 NOTE — PROGRESS NOTES
Note to reader: this note follows the APSO format rather than the historical SOAP format. Assessment and plan located at the top of the note for ease of use.    Chief Complaint  69 y.o. year old female here with obstructive kidney stone. S/p Stent placement.     Assessment/Plan  Interval History     Plan:   Continue oral abx output for at least 7 days total.   No further urologic interventions or tx planned at this time   Pt will need definitive stone treatment in 2-3 weeks   Urology signing off     Case discussed with Dr. Lindsey BRANNON  Patient seen and examined    12/5- Pt doing much better today. Denies pain, fever or chills. She did have some urgency just after procedure but this has improved. Reviewed stent irritation in detail with pt and family. Pts labs are normal and she appears stable.            Review of Systems  Physical Exam   Review of Systems   Constitutional:  Negative for chills and fever.   Respiratory:  Negative for cough.    Cardiovascular:  Negative for chest pain.   Gastrointestinal: Negative.    Genitourinary: Negative.    Skin:  Negative for rash.   Neurological:  Negative for dizziness.   Psychiatric/Behavioral:  Negative for depression.    All other systems reviewed and are negative.    Vitals:    12/05/24 0000 12/05/24 0356 12/05/24 0711 12/05/24 0819   BP: (!) 156/69 134/63     Pulse: 83 70 68    Resp: 18 20 18    Temp:  36.7 °C (98 °F) 36.6 °C (97.9 °F)    TempSrc:  Temporal Temporal    SpO2: 92% 92% 94% 90%   Weight:       Height:         Physical Exam  Vitals reviewed. Exam conducted with a chaperone present.   Constitutional:       Appearance: Normal appearance.   HENT:      Head: Normocephalic.      Mouth/Throat:      Mouth: Mucous membranes are moist.   Pulmonary:      Effort: Pulmonary effort is normal.   Abdominal:      General: Abdomen is flat.   Skin:     General: Skin is warm.   Neurological:      General: No focal deficit present.      Mental Status: She is alert.    Psychiatric:         Mood and Affect: Mood normal.          Hematology Chemistry   Lab Results   Component Value Date/Time    WBC 11.9 (H) 12/05/2024 01:29 AM    WBC 7.0 07/20/2010 08:50 AM    HEMOGLOBIN 12.8 12/05/2024 01:29 AM    HEMATOCRIT 40.2 12/05/2024 01:29 AM    PLATELETCT 308 12/05/2024 01:29 AM     Lab Results   Component Value Date/Time    SODIUM 141 12/05/2024 01:29 AM    POTASSIUM 3.9 12/05/2024 01:29 AM    CHLORIDE 105 12/05/2024 01:29 AM    CO2 22 12/05/2024 01:29 AM    GLUCOSE 265 (H) 12/05/2024 01:29 AM    BUN 16 12/05/2024 01:29 AM    CREATININE 0.73 12/05/2024 01:29 AM    CREATININE 0.77 07/20/2010 08:50 AM    GLOMRATE >59 07/20/2010 08:50 AM         Labs not explicitly included in this progress note were reviewed by the author.   Radiology/imaging not explicitly included in this progress note was reviewed by the author.     Medications reviewed, Labs reviewed and Radiology images reviewed                          Michelle Arango P.A.-C.   [address] [id number] [phone]

## 2024-12-05 NOTE — ANESTHESIA POSTPROCEDURE EVALUATION
Patient: Zena Hui    Procedure Summary       Date: 12/04/24 Room / Location: [place] [address] / SURGERY [facility]    Anesthesia Start: 1405 Anesthesia Stop: 1435    Procedure: CYSTOSCOPY, WITH URETERAL STENT INSERTION (Right: Ureter) Diagnosis: (Kidney Stone)    Surgeons: Timur Portillo M.D. Responsible Provider: Valdemar Bacon M.D.    Anesthesia Type: general ASA Status: 2 - Emergent            Final Anesthesia Type: general  Last vitals  BP   Blood Pressure : 134/63    Temp   36.6 °C (97.9 °F)    Pulse   68   Resp   18    SpO2   94 %      Anesthesia Post Evaluation    Patient location during evaluation: PACU  Patient participation: complete - patient participated  Level of consciousness: awake and alert  Pain score: 1    Airway patency: patent  Anesthetic complications: no  Cardiovascular status: hemodynamically stable  Respiratory status: acceptable  Hydration status: euvolemic    PONV: none          No notable events documented.     Nurse Pain Score: 1 (NPRS)

## 2024-12-05 NOTE — DISCHARGE SUMMARY
Discharge Summary    CHIEF COMPLAINT ON ADMISSION  Chief Complaint   Patient presents with    Flank Pain     Pt reports flank pain and RLQ pain since 1AM this morning with associated N/V.  Pt states she has a hx of kidney stones.      Abdominal Pain    N/V       Reason for Admission  RLQ pain     Admission Date  12/4/2024    CODE STATUS  Full Code    HPI & HOSPITAL COURSE  This is a 69 y.o. female with a past medical history of hypertension, nephrolithiasis who presented with right-sided flank pain with radiation to the groin.  Her vitals were stable on admission.  Her WBC was elevated at 15.7.  UA was mildly positive for infection.  CT revealed bilateral nephrolithiasis with 5.7 mm right ureteropelvic junction stone.  Urology was consulted and the patient underwent cystoscopy with right ureteral stent placement.  Patient was also treated with antibiotics for presumed UTI.  Patient's symptoms had resolved and her vitals were stable.  Her pain was well-controlled and she was able to ambulate and tolerate a diet.  She will be discharged home with close outpatient follow-up with urology and was instructed to return for any worsening symptoms    Therefore, she is discharged in good and stable condition to home with close outpatient follow-up.    The patient recovered much more quickly than anticipated on admission.    Discharge Date  12/5/24    FOLLOW UP ITEMS POST DISCHARGE  PCP and Urology    DISCHARGE DIAGNOSES  Principal Problem:    Ureterolithiasis (POA: Yes)  Active Problems:    Essential hypertension (POA: Yes)    Type 2 diabetes mellitus with hyperglycemia, with long-term current use of insulin (HCC) (POA: Yes)    Severe obesity (BMI 35.0-39.9) with comorbidity (HCC) (POA: Yes)    Dyslipidemia associated with type 2 diabetes mellitus (HCC) (POA: Yes)    Leukocytosis (POA: Yes)    Elevated lipase (POA: Yes)    Abnormal urinalysis (POA: Yes)  Resolved Problems:    * No resolved hospital problems. *      FOLLOW  UP  Future Appointments   Date Time Provider Department Center   3/13/2025  8:20 AM KYLE Kan   11/11/2025 10:15 AM Jaswant Ridley M.D. [facility] None     Timur Portillo M.D.  [address]  [place] [address]  [phone]    Follow up in 1 week(s)        MEDICATIONS ON DISCHARGE     Medication List        START taking these medications        Instructions   cephALEXin 500 MG Caps  Commonly known as: Keflex   Take 2 Capsules by mouth 3 times a day for 5 days.  Dose: 1,000 mg            CHANGE how you take these medications        Instructions   Toujeo SoloStar 300 UNIT/ML Sopn  What changed: See the new instructions.  Generic drug: Insulin Glargine (1 Unit Dial)   Doctor's comments: Requesting 1 year supply  INJECT 50 UNITS UNDER THE SKIN  DAILY            CONTINUE taking these medications        Instructions   amlodipine-valsartan  MG per tablet  Commonly known as: Exforge   Take 1 Tablet by mouth every day.  Dose: 1 Tablet     aspirin 81 MG tablet   Take 81 mg by mouth every day.  Dose: 81 mg     FreeStyle Christina 2 Sensor Misc   USE 1 EACH EVERY 14 DAYS.     glimepiride 4 MG Tabs  Commonly known as: Amaryl   TAKE 1 TABLET BY MOUTH TWICE A DAY     Lyumjev KwikPen 100 UNIT/ML Sopn  Generic drug: Insulin Lispro-aabc (1 U Dial)   Inject 12 Units under the skin 3 times a day before meals. 90 day  Dose: 12 Units     metformin 1000 MG tablet  Commonly known as: Glucophage   TAKE 1 TABLET BY MOUTH TWICE A DAY WITH FOOD  Dose: 1,000 mg     MULTIVITAMIN ADULT PO   Take 1 Tablet by mouth every morning.  Dose: 1 Tablet     Ozempic (1 MG/DOSE) 4 MG/3ML Sopn  Generic drug: Semaglutide (1 MG/DOSE)   Inject 1 mg under the skin every 7 days.  Dose: 1 mg     rosuvastatin 20 MG Tabs  Commonly known as: Crestor   Take 1 Tablet by mouth every day.  Dose: 20 mg     VITAMIN B COMPLEX PO   Take 1 Tablet by mouth every morning. Once daily  Dose: 1 Tablet     VITAMIN D PO   Take 1 Tablet by mouth  every morning. Once dialy  Dose: 1 Tablet              Allergies  No Known Allergies    DIET  Orders Placed This Encounter   Procedures    Diet Order Diet: Regular     Standing Status:   Standing     Number of Occurrences:   1     Order Specific Question:   Diet:     Answer:   Regular [1]       ACTIVITY  As tolerated.  Weight bearing as tolerated    CONSULTATIONS  Urology Dr Portillo    PROCEDURES  CYSTOSCOPY, WITH URETERAL STENT INSERTION     LABORATORY  Lab Results   Component Value Date    SODIUM 141 12/05/2024    POTASSIUM 3.9 12/05/2024    CHLORIDE 105 12/05/2024    CO2 22 12/05/2024    GLUCOSE 265 (H) 12/05/2024    BUN 16 12/05/2024    CREATININE 0.73 12/05/2024    CREATININE 0.77 07/20/2010    GLOMRATE >59 07/20/2010        Lab Results   Component Value Date    WBC 11.9 (H) 12/05/2024    WBC 7.0 07/20/2010    HEMOGLOBIN 12.8 12/05/2024    HEMATOCRIT 40.2 12/05/2024    PLATELETCT 308 12/05/2024        Total time of the discharge process exceeds 33 minutes.

## 2024-12-05 NOTE — WOUND TEAM
Wound consult received regarding patient left upper arm. Chart reviewed. No obvious signs of pressure injury at this time. Bedside RN's to monitor and call wound team for questions/concerns.

## 2024-12-05 NOTE — CARE PLAN
The patient is Stable - Low risk of patient condition declining or worsening    Shift Goals  Clinical Goals: IV Fluids, IV ABX  Patient Goals: rest, go home  Family Goals: AGUSTIN    Progress made toward(s) clinical / shift goals:    Problem: Knowledge Deficit - Standard  Goal: Patient and family/care givers will demonstrate understanding of plan of care, disease process/condition, diagnostic tests and medications  Description: Target End Date: 12/5/2024    Document in Patient Education    1.  Patient and family/caregiver oriented to unit, equipment, visitation policy and means for communicating concern  2.  Complete/review Learning Assessment  3.  Assess knowledge level of disease process/condition, treatment plan, diagnostic tests and medications  4.  Explain disease process/condition, treatment plan, diagnostic tests and medications  Outcome: Progressing     Problem: Urinary Elimination  Goal: Establish and maintain regular urinary output  Description: Target End Date: 12/5/2024    Document on I/O and Assessment flowsheets    1.  Evaluate need to continue indwelling catheter every shift  2.  Assess signs and symptoms of urinary retention  3.  Assess post-void residual volumes  4.  Implement bladder training program  5.  Encourage scheduled voidings  6.  Assist patient to sit on bedside commode or toilet for voiding  7.  Educate patient and family/caregiver on use and purpose of urine collection devices (document in Patient Education)  Outcome: Progressing

## 2024-12-05 NOTE — PROGRESS NOTES
Patient to be discharged today - patient aware and agreeable to plan. D/c instructions reviewed with patient - ?'s/concerns answered. 1 piv removed and waiting on meds to beds.

## 2024-12-05 NOTE — DISCHARGE INSTRUCTIONS
Kidney Stones  Kidney stones are rock-like masses that form inside of the kidneys. Kidneys are organs that make pee (urine). A kidney stone may move into other parts of the urinary tract, including:  The tubes that connect the kidneys to the bladder (ureters).  The bladder.  The tube that carries urine out of the body (urethra).  Kidney stones can cause very bad pain and can block the flow of pee. The stone usually leaves your body (passes) through your pee. You may need to have a doctor take out the stone.  What are the causes?  Kidney stones may be caused by:  A condition in which certain glands make too much parathyroid hormone (primary hyperparathyroidism).  A buildup of a type of crystals in the bladder made of a chemical called uric acid. The body makes uric acid when you eat certain foods.  Narrowing (stricture) of one or both of the ureters.  A kidney blockage that you were born with.  Past surgery on the kidney or the ureters, such as gastric bypass surgery.  What increases the risk?  You are more likely to develop this condition if:  You have had a kidney stone in the past.  You have a family history of kidney stones.  You do not drink enough water.  You eat a diet that is high in protein, salt (sodium), or sugar.  You are overweight or very overweight (obese).  What are the signs or symptoms?  Symptoms of a kidney stone may include:  Pain in the side of the belly, right below the ribs (flank pain). Pain usually spreads (radiates) to the groin.  Needing to pee often or right away (urgently).  Pain when going pee (urinating).  Blood in your pee (hematuria).  Feeling like you may vomit (nauseous).  Vomiting.  Fever and chills.  How is this treated?  Treatment depends on the size, location, and makeup of the kidney stones. The stones will often pass out of the body through peeing. You may need to:  Drink more fluid to help pass the stone. In some cases, you may be given fluids through an IV tube put into one  of your veins at the hospital.  Take medicine for pain.  Make changes in your diet to help keep kidney stones from coming back.  Sometimes, medical procedures are needed to remove a kidney stone. This may involve:  A procedure to break up kidney stones using a beam of light (laser) or shock waves.  Surgery to remove the kidney stones.  Follow these instructions at home:  Medicines  Take over-the-counter and prescription medicines only as told by your doctor.  Ask your doctor if the medicine prescribed to you requires you to avoid driving or using heavy machinery.  Eating and drinking  Drink enough fluid to keep your pee pale yellow. You may be told to drink at least 8-10 glasses of water each day. This will help you pass the stone.  If told by your doctor, change your diet. This may include:  Limiting how much salt you eat.  Eating more fruits and vegetables.  Limiting how much meat, poultry, fish, and eggs you eat.  Follow instructions from your doctor about eating or drinking restrictions.  General instructions  Collect pee samples as told by your doctor. You may need to collect a pee sample:  24 hours after a stone comes out.  8-12 weeks after a stone comes out, and every 6-12 months after that.  Strain your pee every time you pee (urinate), for as long as told. Use the strainer that your doctor recommends.  Do not throw out the stone. Keep it so that it can be tested by your doctor.  Keep all follow-up visits as told by your doctor. This is important. You may need follow-up tests.  How is this prevented?  To prevent another kidney stone:  Drink enough fluid to keep your pee pale yellow. This is the best way to prevent kidney stones.  Eat healthy foods.  Avoid certain foods as told by your doctor. You may be told to eat less protein.  Stay at a healthy weight.  Where to find more information  [employer] Kidney Foundation (NKF): www.kidney.org  [facility] ([facility]): www.urologyhealth.org  Contact a doctor  if:  You have pain that gets worse or does not get better with medicine.  Get help right away if:  You have a fever or chills.  You get very bad pain.  You get new pain in your belly (abdomen).  You pass out (faint).  You cannot pee.  Summary  Kidney stones are rock-like masses that form inside of the kidneys.  Kidney stones can cause very bad pain and can block the flow of pee.  The stones will often pass out of the body through peeing.  Drink enough fluid to keep your pee pale yellow.  This information is not intended to replace advice given to you by your health care provider. Make sure you discuss any questions you have with your health care provider.  Document Revised: 08/22/2022 Document Reviewed: 08/22/2022  [employer] Patient Education © 2023 [employer]    Lithotripsy, Care After  This sheet gives you information about how to care for yourself after your procedure. Your health care provider may also give you more specific instructions. If you have problems or questions, contact your health care provider.  What can I expect after the procedure?  After the procedure, it is common to have:  Some blood in your urine. This should only last for a few days.  Soreness in your back, sides, or upper abdomen for a few days.  Blotches or bruises on the area where the shock wave entered the skin.  Pain, discomfort, or nausea when pieces (fragments) of the kidney stone move through the tube that carries urine from the kidney to the bladder (ureter). Stone fragments may pass soon after the procedure, but they may continue to pass for up to 4-8 weeks.  If you have severe pain or nausea, contact your health care provider. This may be caused by a large stone that was not broken up, and this may mean that you need more treatment.  Some pain or discomfort during urination.  Some pain or discomfort in the lower abdomen or (in men) at the base of the penis.  Follow these instructions at home:  Medicines  Take over-the-counter  and prescription medicines only as told by your health care provider.  If you were prescribed an antibiotic medicine, take it as told by your health care provider. Do not stop taking the antibiotic even if you start to feel better.  Ask your health care provider if the medicine prescribed to you requires you to avoid driving or using machinery.  Eating and drinking         Drink enough fluid to keep your urine pale yellow. This helps any remaining pieces of the stone to pass. It can also help prevent new stones from forming.  Eat plenty of fresh fruits and vegetables.  Follow instructions from your health care provider about eating or drinking restrictions. You may be instructed to:  Reduce how much salt (sodium) you eat or drink. Check ingredients and nutrition facts on packaged foods and beverages to see how much sodium they contain.  Reduce how much meat you eat.  Eat the recommended amount of calcium for your age and gender. Ask your health care provider how much calcium you should have.  General instructions  Get plenty of rest.  Return to your normal activities as told by your health care provider. Ask your health care provider what activities are safe for you. Most people can resume normal activities 1-2 days after the procedure.  If you were given a sedative during the procedure, it can affect you for several hours. Do not drive or operate machinery until your health care provider says that it is safe.  Your health care provider may direct you to lie in a certain position (postural drainage) and tap firmly (percuss) over your kidney area to help stone fragments pass. Follow instructions as told by your health care provider.  If directed, strain all urine through the strainer that was provided by your health care provider.  Keep all fragments for your health care provider to see. Any stones that are found may be sent to a medical lab for examination. The stone may be as small as a grain of salt.  Keep all  follow-up visits as told by your health care provider. This is important.  Contact a health care provider if:  You have a fever or chills.  You have nausea that is severe or does not go away.  You have any of these urinary symptoms:  Blood in your urine for longer than your health care provider told you to expect.  Urine that smells bad or unusual.  Feeling a strong urge to urinate after emptying your bladder.  Pain or burning with urination that does not go away.  Urinating more often than usual and this does not go away.  You have a stent and it comes out.  Get help right away if:  You have severe pain in your back, sides, or upper abdomen.  You have any of these urinary symptoms:  Severe pain while urinating.  More blood in your urine or having blood in your urine when you did not before.  Passing blood clots in your urine.  Passing only a small amount of urine or being unable to pass any urine at all.  You have severe nausea that leads to persistent vomiting.  You faint.  Summary  After this procedure, it is common to have some pain, discomfort, or nausea when pieces (fragments) of the kidney stone move through the tube that carries urine from the kidney to the bladder (ureter). If this pain or nausea is severe, however, you should contact your health care provider.  Return to your normal activities as told by your health care provider. Ask your health care provider what activities are safe for you.  Drink enough fluid to keep your urine pale yellow. This helps any remaining pieces of the stone to pass, and it can help prevent new stones from forming.  If directed, strain your urine and keep all fragments for your health care provider to see. Fragments or stones may be as small as a grain of salt.  Get help right away if you have severe pain in your back, sides, or upper abdomen, or if you have severe pain while urinating.  This information is not intended to replace advice given to you by your health care  provider. Make sure you discuss any questions you have with your health care provider.  Document Revised: 11/14/2022 Document Reviewed: 08/22/2022  Elsevier Patient Education © 2023 [employer]

## 2024-12-06 ENCOUNTER — PATIENT OUTREACH (OUTPATIENT)
Dept: MEDICAL GROUP | Facility: PHYSICIAN GROUP | Age: 69
End: 2024-12-06
Payer: MEDICARE

## 2024-12-06 NOTE — PROGRESS NOTES
Transitional Care Management  TCM Outreach Date and Time: Filed (12/6/2024  9:08 AM)    Discharge Questions  Actual Discharge Date: 12/05/24  Now that you are home, how are you feeling?: Good  Did you receive any new prescriptions?: Yes  Were you able to get them filled?: Yes  Meds to Bed or Pharmacy filled?: Meds to Bed  Do you have any questions about your current medications or new medications (Review Med Rec)?: No  Did you have any durable medical equipment ordered?: No  Do you have a follow up appointment scheduled with your PCP?: No  Was an appointment scheduled for the patient?: Yes  Appointment Date: 12/10/24  Appointment Time: 1340  Any issues or paperwork you wish to discuss with your PCP?: No  Are you (patient) able to get to the appointment?: Yes  If Home Health was ordered, have they contacted you (Patient): Not Applicable  Did you have enough support after your last discharge?: Yes  Does this patient qualify for the CCM program?: Yes (pt's chart routed to ccm rn)    Transitional Care  Number of attempts made to contact patient: 1  Current or previous attempts completed within two business days of discharge? : Yes  Provided education regarding treatment plan, medications, self-management, ADLs?: No  Has patient completed an Advanced Directive?: No  Has the Care Manager's phone number provided?: No    Discharge Summary  Chief Complaint: Flank Pain        Pt reports flank pain and RLQ pain since 1AM this morning with associated N/V.  Pt states she has a hx of kidney stones.      Abdominal Pain    N/V  Admitting Diagnosis: RLQ pain  Discharge Diagnosis: Ureterolithiasis         No

## 2024-12-10 ENCOUNTER — OFFICE VISIT (OUTPATIENT)
Dept: MEDICAL GROUP | Facility: PHYSICIAN GROUP | Age: 69
End: 2024-12-10
Payer: MEDICARE

## 2024-12-10 VITALS
WEIGHT: 211.25 LBS | TEMPERATURE: 98.7 F | SYSTOLIC BLOOD PRESSURE: 130 MMHG | OXYGEN SATURATION: 96 % | HEIGHT: 63 IN | DIASTOLIC BLOOD PRESSURE: 74 MMHG | BODY MASS INDEX: 37.43 KG/M2 | RESPIRATION RATE: 20 BRPM | HEART RATE: 84 BPM

## 2024-12-10 DIAGNOSIS — Z09 HOSPITAL DISCHARGE FOLLOW-UP: Primary | ICD-10-CM

## 2024-12-10 ASSESSMENT — FIBROSIS 4 INDEX: FIB4 SCORE: 0.98

## 2024-12-10 NOTE — PROGRESS NOTES
"Subjective:     Zena Hui is a 69 y.o. female who presents for Hospital Follow-up.    Transitional Care Management  TCM Outreach Date and Time: Filed (12/6/2024  9:08 AM)    Discharge Questions  Actual Discharge Date: 12/05/24  Now that you are home, how are you feeling?: Good  Did you receive any new prescriptions?: Yes  Were you able to get them filled?: Yes  Meds to Bed or Pharmacy filled?: Meds to Bed  Do you have any questions about your current medications or new medications (Review Med Rec)?: No  Did you have any durable medical equipment ordered?: No  Do you have a follow up appointment scheduled with your PCP?: No  Was an appointment scheduled for the patient?: Yes  Appointment Date: 12/10/24  Appointment Time: 1340  Any issues or paperwork you wish to discuss with your PCP?: No  Are you (patient) able to get to the appointment?: Yes  If Home Health was ordered, have they contacted you (Patient): Not Applicable  Did you have enough support after your last discharge?: Yes  Does this patient qualify for the CCM program?: Yes (pt's chart routed to ccm rn)    Transitional Care  Number of attempts made to contact patient: 1  Current or previous attempts completed within two business days of discharge? : Yes  Provided education regarding treatment plan, medications, self-management, ADLs?: No  Has patient completed an Advanced Directive?: No  Has the Care Manager's phone number provided?: No    Discharge Summary  Chief Complaint: Flank Pain        Pt reports flank pain and RLQ pain since 1AM this morning with associated N/V.  Pt states she has a hx of kidney stones.      Abdominal Pain    N/V  Admitting Diagnosis: RLQ pain  Discharge Diagnosis: Ureterolithiasis        HPI:   Recently hospitalized for :  \"HPI & HOSPITAL COURSE  This is a 69 y.o. female with a past medical history of hypertension, nephrolithiasis who presented with right-sided flank pain with radiation to the groin.  Her vitals were stable " "on admission.  Her WBC was elevated at 15.7.  UA was mildly positive for infection.  CT revealed bilateral nephrolithiasis with 5.7 mm right ureteropelvic junction stone.  Urology was consulted and the patient underwent cystoscopy with right ureteral stent placement.  Patient was also treated with antibiotics for presumed UTI.  Patient's symptoms had resolved and her vitals were stable.  Her pain was well-controlled and she was able to ambulate and tolerate a diet.  She will be discharged home with close outpatient follow-up with urology and was instructed to return for any worsening symptoms\"     She was instructed to follow-up with urology clinic in 1 week but has not been able to get them to call her back to set up an appointment.  She is having a little discomfort down in her genital area but that she thinks is due from wiping so often.  No itching or discharge.  She is on her last day of antibiotics today.  A urine culture was not done.  She has had some left hip pain following the procedure as she thinks she may have been positioned in a way that aggravated her her hip.    Current medicines (including reconciliation performed today)  Current Outpatient Medications   Medication Sig Dispense Refill    cephALEXin (KEFLEX) 500 MG Cap Take 2 Capsules by mouth 3 times a day for 5 days. 30 Capsule 0    Insulin Lispro-aabc, 1 U Dial, (LYUMDEDRICKV KWIKPEN) 100 UNIT/ML Solution Pen-injector Inject 12 Units under the skin 3 times a day before meals. 90 day 45 mL 2    metformin (GLUCOPHAGE) 1000 MG tablet TAKE 1 TABLET BY MOUTH TWICE A DAY WITH FOOD 60 Tablet 8    amlodipine-valsartan (EXFORGE)  MG per tablet Take 1 Tablet by mouth every day. 100 Tablet 1    rosuvastatin (CRESTOR) 20 MG Tab Take 1 Tablet by mouth every day. 100 Tablet 1    Semaglutide, 1 MG/DOSE, (OZEMPIC, 1 MG/DOSE,) 4 MG/3ML Solution Pen-injector Inject 1 mg under the skin every 7 days. 9 mL 3    TOUJEO SOLOSTAR 300 UNIT/ML Solution Pen-injector " "INJECT 50 UNITS UNDER THE SKIN  DAILY 13.5 mL 3    glimepiride (AMARYL) 4 MG Tab TAKE 1 TABLET BY MOUTH TWICE A  Tablet 4    Continuous Blood Gluc Sensor (FREESTYLE ETELVINA 2 SENSOR) Misc USE 1 EACH EVERY 14 DAYS. 6 Each 3    B Complex Vitamins (VITAMIN B COMPLEX PO) Take 1 Tablet by mouth every morning. Once daily      VITAMIN D PO Take 1 Tablet by mouth every morning. Once dialy      Multiple Vitamin (MULTIVITAMIN ADULT PO) Take 1 Tablet by mouth every morning.      aspirin 81 MG tablet Take 81 mg by mouth every day.       No current facility-administered medications for this visit.       Allergies:   Patient has no known allergies.    Social History     Tobacco Use    Smoking status: Never    Smokeless tobacco: Never   Vaping Use    Vaping status: Never Used   Substance Use Topics    Alcohol use: Never    Drug use: Never       ROS:  Left hip pain.    Objective:     Vitals:    12/10/24 1327   BP: 130/74   BP Location: Left arm   Patient Position: Sitting   BP Cuff Size: Adult   Pulse: 84   Resp: 20   Temp: 37.1 °C (98.7 °F)   TempSrc: Temporal   SpO2: 96%   Weight: 95.8 kg (211 lb 4 oz)   Height: 1.6 m (5' 3\")     Body mass index is 37.42 kg/m².    Physical Exam:  Vital signs are stable.  Afebrile.  General: Patient appears in no acute distress until she gets up to walk and then has pain in her left hip with ambulation.  Abdomen: Benign    Labs and x-rays done in the hospital reviewed    Assessment and Plan:   1. Hospital discharge follow-up  This is a new problem.  Patient has a stent placed on the right side.  She is trying to get into urology for continued follow-up.  Will have her Senior care nurse to see if she can assist in calling for that appointment.  Patient to let me know if she has any other issues in the meantime.  She is to push fluids.  Heat to the painful hip area.  - Chart and discharge summary were reviewed.   - Hospitalization and results reviewed with patient.   - Medications reviewed " including instructions regarding high risk medications, dosing and side effects.  - Recommended Services: No services needed at this time  - Advance directive/POLST on file?  No     Follow-up:Return if symptoms worsen or fail to improve.    Face-to-face transitional care management services with HIGH (today's visit is within days post discharge & LACE+ score 59+) medical decision complexity were provided.

## 2024-12-11 RX ORDER — FLUCONAZOLE 150 MG/1
150 TABLET ORAL DAILY
Qty: 1 TABLET | Refills: 0 | Status: SHIPPED | OUTPATIENT
Start: 2024-12-11

## 2024-12-12 ENCOUNTER — TELEPHONE (OUTPATIENT)
Dept: MEDICAL GROUP | Facility: PHYSICIAN GROUP | Age: 69
End: 2024-12-12
Payer: MEDICARE

## 2024-12-12 NOTE — TELEPHONE ENCOUNTER
"Spoke to Quan at [facility] patient is on their \"urgent\" list to be scheduled for surgery for definitive stone treatment in 2-3 weeks. The office will be calling her and she will be scheduled with Dr. Fair. I will alert PCP. Left message for patient - awaiting call back.   "

## 2024-12-20 DIAGNOSIS — Z79.4 TYPE 2 DIABETES MELLITUS WITH HYPERGLYCEMIA, WITH LONG-TERM CURRENT USE OF INSULIN (HCC): ICD-10-CM

## 2024-12-20 DIAGNOSIS — E11.65 TYPE 2 DIABETES MELLITUS WITH HYPERGLYCEMIA, WITH LONG-TERM CURRENT USE OF INSULIN (HCC): ICD-10-CM

## 2024-12-23 RX ORDER — INSULIN GLARGINE 300 U/ML
INJECTION, SOLUTION SUBCUTANEOUS
Qty: 18 ML | Refills: 3 | Status: SHIPPED | OUTPATIENT
Start: 2024-12-23

## 2025-01-10 ENCOUNTER — HOSPITAL ENCOUNTER (OUTPATIENT)
Facility: MEDICAL CENTER | Age: 70
End: 2025-01-10
Attending: UROLOGY
Payer: MEDICARE

## 2025-01-10 ENCOUNTER — TELEPHONE (OUTPATIENT)
Dept: CARDIOLOGY | Facility: MEDICAL CENTER | Age: 70
End: 2025-01-10
Payer: MEDICARE

## 2025-01-10 LAB
APPEARANCE UR: ABNORMAL
BACTERIA #/AREA URNS HPF: ABNORMAL /HPF
BILIRUB UR QL STRIP.AUTO: NEGATIVE
CA OXALATE CRYSTAL  1765: PRESENT /HPF
CASTS URNS QL MICRO: ABNORMAL /LPF (ref 0–2)
COLOR UR: ABNORMAL
EPITHELIAL CELLS 1715: ABNORMAL /HPF (ref 0–5)
GLUCOSE UR STRIP.AUTO-MCNC: 500 MG/DL
HYALINE CAST   1831: PRESENT /LPF
KETONES UR STRIP.AUTO-MCNC: ABNORMAL MG/DL
LEUKOCYTE ESTERASE UR QL STRIP.AUTO: ABNORMAL
MICRO URNS: ABNORMAL
NITRITE UR QL STRIP.AUTO: NEGATIVE
PH UR STRIP.AUTO: 5.5 [PH] (ref 5–8)
PROT UR QL STRIP: 300 MG/DL
RBC # URNS HPF: >100 /HPF (ref 0–2)
RBC UR QL AUTO: ABNORMAL
SP GR UR STRIP.AUTO: 1.02
UROBILINOGEN UR STRIP.AUTO-MCNC: 1 EU/DL
WBC #/AREA URNS HPF: ABNORMAL /HPF

## 2025-01-10 PROCEDURE — 87086 URINE CULTURE/COLONY COUNT: CPT

## 2025-01-10 PROCEDURE — 81001 URINALYSIS AUTO W/SCOPE: CPT

## 2025-01-12 LAB
BACTERIA UR CULT: NORMAL
SIGNIFICANT IND 70042: NORMAL
SITE SITE: NORMAL
SOURCE SOURCE: NORMAL

## 2025-01-13 ENCOUNTER — PATIENT MESSAGE (OUTPATIENT)
Dept: ENDOCRINOLOGY | Facility: MEDICAL CENTER | Age: 70
End: 2025-01-13
Payer: MEDICARE

## 2025-01-13 NOTE — PROGRESS NOTES
Contacted patient and recommended she reduce her insulin to 40 units the night before surgery hold glimepiride continue metformin and hold her Ozempic.  She can resume her Ozempic after surgery as well as her mealtime insulin and her meds

## 2025-01-14 ENCOUNTER — TELEPHONE (OUTPATIENT)
Dept: CARDIOLOGY | Facility: MEDICAL CENTER | Age: 70
End: 2025-01-14
Payer: MEDICARE

## 2025-01-14 NOTE — TELEPHONE ENCOUNTER
Last OV: 11.05.2024  Proposed Surgery: Cystoscopy, w/ Ureteroscopy, w/ Lithotripsy, w/ Insertion of Ureteral Stent  Surgery Date: 01.20.2025  Requesting Office Name: Jamin Adkins  Fax Number: [phone]  Preference of Location (default is surgery center unless specified by Cardiologist or [facility])  Prior Clearance Addressed: No    Is this a general clearance? YES   Anticoags/Antiplatelets: Aspirin  Anticoags/Antiplatelet managed by Cardiology? 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 NO  Ablation, Cardioversion, Watchman, Stents, Cath, Devices within the last 3 months? No   If yes- Send dental wait letter, do not forward to provider for review.     TAVR / Valve, Mitral clip within the last 6 months? No  If yes- Send dental wait letter, do not forward to provider for review.     If completing a general clearance, continue per protocol.           Surgical Clearance Letter Sent: YES   **Scan clearance request letter into [facility].**

## 2025-01-14 NOTE — LETTER
PROCEDURE/SURGERY CLEARANCE FORM      Encounter Date: 1/14/2025    Patient: Zena Hui  YOB: 1955    CARDIOLOGIST:  Jaswant Ridley M.D.    REFERRING DOCTOR:  No ref. provider found    Procedure/surgery: Cystoscopy, w/ Ureteroscopy, w/ Lithotripsy, w/ Insertion of Ureteral Stent                                           PROCEDURE/SURGERY CLEARANCE FORM    Date: 1/14/2025   Patient Name: Zena Hui    Dear Surgeon or Proceduralist,      Thank you for your request for cardiac stratification of our mutual patient Zena Hui 1955. We have reviewed their [facility] records; and to the best of our understanding this patient has not had stenting, ablation, watchman, cardiothoracic surgery or hospitalization for cardiovascular reasons in the past 6 months.  Zena Hui has been seen within the past 15 months and is considered to have non-modifiable cardiac risk for this low-risk procedure/surgery. They may proceed from a cardiovascular standpoint and may hold their antiplatelet/anticoagulation as briefly as possible. Please have patient resume this medication when hemodynamically stable to do so.     Aspirin or Prasugrel   - hold 7 days prior to procedure/surgery, resume when hemodynamically stable      Clopidrogrel or Ticagrelor  - hold 7 days for all neurological procedures, hold 5 days prior to all other procedure/surgery,  resume when hemodynamically stable     Warfarin - hold 7 days for all neurological procedures, hold 5 days prior to all other procedure/surgery and coordinate with [facility] ([phone]) INR testing and dose management.      Pradaxa/Xarelto/Eliquis/Savesya - hold 1 day prior to procedure for low bleeding risk procedure, 2 days for high bleeding risk procedure, or consider holding 3 days or longer for patients with reduced kidney function (CrCl <30mL/min) or spinal/cranial surgeries/procedures.      If they have a mechanical heart valve,  please coordinate with [facility] ([phone]) the proper management of their anticoagulant in the periprocedural or perioperative period.      Some patients have higher risk for cardiovascular complications or holding medication. If our patient has had prior complications of holding antiplatelet or anticoagulants in the past and we have seen them after these events, we have addressed these concerns with the patient. They are at an unknown degree of increased risk for recurrent complication.  You may hold anticoagulation/antiplatelets for the procedure or surgery if the benefits of the procedure or surgery outweigh this nonmodifiable risk.      If Zena Hui 1955 has new symptoms of heart failure decompensation, unstable arrythmia, or angina please reach out and we will assess the patient.      If you have other patient-specific concerns, please feel free to reach out to the patient's cardiologist directly at [phone].     Thank you,       [facility]      Electronically signed        MD Signature   Jaswant Ridley M.D.

## 2025-01-22 ENCOUNTER — APPOINTMENT (OUTPATIENT)
Dept: MEDICAL GROUP | Facility: PHYSICIAN GROUP | Age: 70
End: 2025-01-22
Payer: MEDICARE

## 2025-01-28 ENCOUNTER — APPOINTMENT (OUTPATIENT)
Dept: URGENT CARE | Facility: PHYSICIAN GROUP | Age: 70
End: 2025-01-28
Payer: MEDICARE

## 2025-02-05 ENCOUNTER — PATIENT MESSAGE (OUTPATIENT)
Dept: CARDIOLOGY | Facility: MEDICAL CENTER | Age: 70
End: 2025-02-05
Payer: MEDICARE

## 2025-02-06 ENCOUNTER — PATIENT MESSAGE (OUTPATIENT)
Dept: CARDIOLOGY | Facility: MEDICAL CENTER | Age: 70
End: 2025-02-06

## 2025-02-06 ENCOUNTER — OFFICE VISIT (OUTPATIENT)
Dept: MEDICAL GROUP | Facility: PHYSICIAN GROUP | Age: 70
End: 2025-02-06
Payer: MEDICARE

## 2025-02-06 VITALS
HEART RATE: 88 BPM | RESPIRATION RATE: 18 BRPM | OXYGEN SATURATION: 97 % | WEIGHT: 214 LBS | TEMPERATURE: 97.7 F | SYSTOLIC BLOOD PRESSURE: 132 MMHG | BODY MASS INDEX: 37.92 KG/M2 | HEIGHT: 63 IN | DIASTOLIC BLOOD PRESSURE: 74 MMHG

## 2025-02-06 DIAGNOSIS — Z79.4 TYPE 2 DIABETES MELLITUS WITH HYPERGLYCEMIA, WITH LONG-TERM CURRENT USE OF INSULIN (HCC): ICD-10-CM

## 2025-02-06 DIAGNOSIS — R80.9 MICROALBUMINURIA DUE TO TYPE 2 DIABETES MELLITUS (HCC): ICD-10-CM

## 2025-02-06 DIAGNOSIS — M05.741 RHEUMATOID ARTHRITIS WITH RHEUMATOID FACTOR OF RIGHT HAND WITHOUT ORGAN OR SYSTEMS INVOLVEMENT (HCC): ICD-10-CM

## 2025-02-06 DIAGNOSIS — Z79.4 LONG-TERM INSULIN USE (HCC): ICD-10-CM

## 2025-02-06 DIAGNOSIS — E11.29 MICROALBUMINURIA DUE TO TYPE 2 DIABETES MELLITUS (HCC): ICD-10-CM

## 2025-02-06 DIAGNOSIS — E66.01 SEVERE OBESITY (HCC): ICD-10-CM

## 2025-02-06 DIAGNOSIS — R60.0 PERIPHERAL EDEMA: ICD-10-CM

## 2025-02-06 DIAGNOSIS — E11.65 TYPE 2 DIABETES MELLITUS WITH HYPERGLYCEMIA, WITH LONG-TERM CURRENT USE OF INSULIN (HCC): ICD-10-CM

## 2025-02-06 DIAGNOSIS — E78.5 DYSLIPIDEMIA ASSOCIATED WITH TYPE 2 DIABETES MELLITUS (HCC): ICD-10-CM

## 2025-02-06 DIAGNOSIS — M46.1 SACROILIITIS (HCC): ICD-10-CM

## 2025-02-06 DIAGNOSIS — E11.69 DYSLIPIDEMIA ASSOCIATED WITH TYPE 2 DIABETES MELLITUS (HCC): ICD-10-CM

## 2025-02-06 DIAGNOSIS — E08.21 DIABETIC NEPHROPATHY ASSOCIATED WITH DIABETES MELLITUS DUE TO UNDERLYING CONDITION (HCC): ICD-10-CM

## 2025-02-06 PROBLEM — R82.90 ABNORMAL URINALYSIS: Status: RESOLVED | Noted: 2024-12-04 | Resolved: 2025-02-06

## 2025-02-06 PROCEDURE — 99214 OFFICE O/P EST MOD 30 MIN: CPT | Performed by: FAMILY MEDICINE

## 2025-02-06 PROCEDURE — 3075F SYST BP GE 130 - 139MM HG: CPT | Performed by: FAMILY MEDICINE

## 2025-02-06 PROCEDURE — 3078F DIAST BP <80 MM HG: CPT | Performed by: FAMILY MEDICINE

## 2025-02-06 ASSESSMENT — FIBROSIS 4 INDEX: FIB4 SCORE: 0.98

## 2025-02-06 ASSESSMENT — PATIENT HEALTH QUESTIONNAIRE - PHQ9: CLINICAL INTERPRETATION OF PHQ2 SCORE: 0

## 2025-02-06 NOTE — ASSESSMENT & PLAN NOTE
This is a chronic problem.  She is seen by endocrinology.  Her last hemoglobin A1c was still elevated at 8.9%.

## 2025-02-06 NOTE — ASSESSMENT & PLAN NOTE
This is a chronic recurrent problem.  Patient states for about the last 4 to 5 weeks she has had recurrence of swelling in her feet.  She states has been no change in her diet or medications.  She started taking Lasix 20 mg that she has to use as needed but that does not seem to be helping.  The swelling tends to be worse toward the end of the day and better for Maldonado in the morning.  Lately she has been sleeping in her recliner due to some back pain but trying to have her legs elevated as well.  No history of CHF.

## 2025-02-06 NOTE — ASSESSMENT & PLAN NOTE
This is a new problem.  Patient been having pain to her left lower back area for about the last 2 weeks..  She went to the [facility] clinic and they x-rayed her lumbar spine.  That showed moderate to severe degenerative disease along with some arthritic changes.  She started taking some Mobic and that seemed to help.  But she stopped it as she is worried it could bother her kidneys.

## 2025-02-06 NOTE — ASSESSMENT & PLAN NOTE
This is a new issue that came up last year.  She did see rheumatology and they ordered some additional test.  I do not see any follow-up note that made a determination of whether she has rheumatoid arthritis or not.

## 2025-02-07 ENCOUNTER — HOSPITAL ENCOUNTER (OUTPATIENT)
Dept: RADIOLOGY | Facility: MEDICAL CENTER | Age: 70
End: 2025-02-07
Attending: FAMILY MEDICINE
Payer: MEDICARE

## 2025-02-07 ENCOUNTER — TELEPHONE (OUTPATIENT)
Dept: CARDIOLOGY | Facility: MEDICAL CENTER | Age: 70
End: 2025-02-07
Payer: MEDICARE

## 2025-02-07 ENCOUNTER — OFFICE VISIT (OUTPATIENT)
Dept: CARDIOLOGY | Facility: MEDICAL CENTER | Age: 70
End: 2025-02-07
Attending: FAMILY MEDICINE
Payer: MEDICARE

## 2025-02-07 VITALS
OXYGEN SATURATION: 96 % | SYSTOLIC BLOOD PRESSURE: 128 MMHG | WEIGHT: 214 LBS | DIASTOLIC BLOOD PRESSURE: 66 MMHG | RESPIRATION RATE: 16 BRPM | HEART RATE: 82 BPM | BODY MASS INDEX: 37.92 KG/M2 | HEIGHT: 63 IN

## 2025-02-07 DIAGNOSIS — G47.33 OSA ON CPAP: ICD-10-CM

## 2025-02-07 DIAGNOSIS — I10 ESSENTIAL HYPERTENSION: ICD-10-CM

## 2025-02-07 DIAGNOSIS — I51.7 LVH (LEFT VENTRICULAR HYPERTROPHY): ICD-10-CM

## 2025-02-07 DIAGNOSIS — R60.0 LOCALIZED EDEMA: ICD-10-CM

## 2025-02-07 DIAGNOSIS — E11.65 TYPE 2 DIABETES MELLITUS WITH HYPERGLYCEMIA, WITH LONG-TERM CURRENT USE OF INSULIN (HCC): ICD-10-CM

## 2025-02-07 DIAGNOSIS — M46.1 SACROILIITIS (HCC): ICD-10-CM

## 2025-02-07 DIAGNOSIS — Z79.4 TYPE 2 DIABETES MELLITUS WITH HYPERGLYCEMIA, WITH LONG-TERM CURRENT USE OF INSULIN (HCC): ICD-10-CM

## 2025-02-07 PROCEDURE — 99213 OFFICE O/P EST LOW 20 MIN: CPT | Performed by: INTERNAL MEDICINE

## 2025-02-07 PROCEDURE — 3074F SYST BP LT 130 MM HG: CPT | Performed by: INTERNAL MEDICINE

## 2025-02-07 PROCEDURE — 3078F DIAST BP <80 MM HG: CPT | Performed by: INTERNAL MEDICINE

## 2025-02-07 PROCEDURE — 72202 X-RAY EXAM SI JOINTS 3/> VWS: CPT

## 2025-02-07 PROCEDURE — 99214 OFFICE O/P EST MOD 30 MIN: CPT | Performed by: INTERNAL MEDICINE

## 2025-02-07 RX ORDER — AMOXICILLIN 500 MG/1
500 CAPSULE ORAL
COMMUNITY
End: 2025-02-07

## 2025-02-07 RX ORDER — KETOROLAC TROMETHAMINE 10 MG/1
TABLET, FILM COATED ORAL
COMMUNITY
End: 2025-02-07

## 2025-02-07 RX ORDER — CEPHALEXIN 500 MG/1
CAPSULE ORAL
COMMUNITY
End: 2025-02-07

## 2025-02-07 RX ORDER — FUROSEMIDE 20 MG/1
40 TABLET ORAL DAILY
COMMUNITY

## 2025-02-07 ASSESSMENT — FIBROSIS 4 INDEX: FIB4 SCORE: 0.98

## 2025-02-07 NOTE — PATIENT COMMUNICATION
Per chart review patient saw PCP today who advised patient in regarding to edema.    [facility] Schedulers: Please call patient to get him scheduled for next available with BRIGIDO ACKERMAN . Thanks!

## 2025-02-10 NOTE — PROGRESS NOTES
Chief Complaint   Patient presents with    Hypertension     Follow up       Subjective:   Ledy Hui is a 69 y.o. female who presents today for follow-up regarding abnormal EKG obtained preoperative setting.  She has a history of DM2 with an A1c of 10.2, HTN, HLP, JASPER, obesity.  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      Since last visit no CP but notes worsening LE edema.    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    Bronchitis     Essential hypertension, benign     High cholesterol     Hyperlipidemia     Mixed hyperlipidemia     Organic sleep apnea, unspecified     Osteopenia     Renal disorder 04/2019    kidney stones    Sleep apnea     Type 2 diabetes mellitus with microalbuminuria, with long-term current use of insulin ([facility]) 02/28/2023    Type II or unspecified type diabetes mellitus without mention of complication, not stated as uncontrolled     oral meds     Past Surgical History:   Procedure Laterality Date    FL CYSTOSCOPY,INSERT URETERAL STENT Right 12/4/2024    Procedure: CYSTOSCOPY, WITH URETERAL STENT INSERTION;  Surgeon: Timur Portillo M.D.;  Location: [facility];  Service: Urology    FL CYSTOSCOPY,INSERT URETERAL STENT Bilateral 07/05/2021    Procedure: CYSTOSCOPY, WITH URETERAL STENT INSERTION - POSSIBLE STENT;  Surgeon: Italo Fair M.D.;  Location: [facility];  Service: Urology    FL CYSTO/URETERO/PYELOSCOPY, DX Bilateral 07/05/2021    Procedure: URETEROSCOPY;  Surgeon: Italo Fair M.D.;  Location: [facility];  Service: Urology    LASERTRIPSY Bilateral 07/05/2021    Procedure: LITHOTRIPSY, USING LASER.;  Surgeon: Italo Fair M.D.;  Location: [facility];  Service: Urology    CYSTOSCOPY N/A 07/05/2019    Procedure: CYSTOSCOPY;  Surgeon: Espinoza Orr M.D.;  Location: [facility];  Service: Urology     URETEROSCOPY Right 2019    Procedure: URETEROSCOPY;  Surgeon: Espinoza Orr M.D.;  Location: [facility];  Service: Urology    LASERTRIPSY Right 2019    Procedure: LITHOTRIPSY, USING LASER;  Surgeon: Espinoza Orr M.D.;  Location: SUR[facility];  Service: Urology    STENT PLACEMENT Right 2019    Procedure: STENT PLACEMENT;  Surgeon: Espinoza Orr M.D.;  Location: SURGERY [facility];  Service: Urology    OTHER  2019    lithotripsy    COLONOSCOPY WITH POLYP  10/2007    tubular adenoma    OTHER ORTHOPEDIC SURGERY N/A 1998    laminectomy    EMBOLECTOMY      remove blood clot from spinal cord    LAMINOTOMY       Family History   Problem Relation Age of Onset    Arthritis Mother     Lung Disease Mother         COPD    Hypertension Mother     Hyperlipidemia Mother     Heart Disease Father          @ 41 from heart attack    Hyperlipidemia Father     Heart Disease Brother         CAD   S/P CABG    Cancer Maternal Grandmother         Pancreatic cancer    Cancer Brother         Prostate cancer     Social History     Socioeconomic History    Marital status:      Spouse name: Not on file    Number of children: Not on file    Years of education: Not on file    Highest education level: Associate degree: academic program   Occupational History    Not on file   Tobacco Use    Smoking status: Never    Smokeless tobacco: Never   Vaping Use    Vaping status: Never Used   Substance and Sexual Activity    Alcohol use: Never    Drug use: Never    Sexual activity: Not Currently     Partners: Male     Birth control/protection: Post-Menopausal   Other Topics Concern    Not on file   Social History Narrative    Not on file     Social Drivers of Health     Financial Resource Strain: Low Risk  (2024)    Overall Financial Resource Strain (CARDIA)     Difficulty of Paying Living Expenses: Not hard at all   Food Insecurity: No Food Insecurity (2024)    Hunger  Vital Sign     Worried About Running Out of Food in the Last Year: Never true     Ran Out of Food in the Last Year: Never true   Transportation Needs: No Transportation Needs (12/4/2024)    PRAPARE - Transportation     Lack of Transportation (Medical): No     Lack of Transportation (Non-Medical): No   Physical Activity: Insufficiently Active (12/11/2022)    Exercise Vital Sign     Days of Exercise per Week: 2 days     Minutes of Exercise per Session: 30 min   Stress: No Stress Concern Present (12/11/2022)    [employer] - Occupational Stress Questionnaire     Feeling of Stress : Not at all   Social Connections: Moderately Isolated (12/11/2022)    Social Connection and Isolation Panel [NHANES]     Frequency of Communication with Friends and Family: More than three times a week     Frequency of Social Gatherings with Friends and Family: Once a week     Attends Religion Services: Never     Active Member of Clubs or Organizations: No     Attends Club or Organization Meetings: Never     Marital Status:    Intimate Partner Violence: Patient Declined (12/4/2024)    Humiliation, Afraid, Rape, and Kick questionnaire     Fear of Current or Ex-Partner: Patient declined     Emotionally Abused: Patient declined     Physically Abused: Patient declined     Sexually Abused: Patient declined   Housing Stability: Low Risk  (12/4/2024)    Housing Stability Vital Sign     Unable to Pay for Housing in the Last Year: No     Number of Times Moved in the Last Year: 0     Homeless in the Last Year: No     No Known Allergies    Outpatient Encounter Medications as of 2/7/2025   Medication Sig Dispense Refill    furosemide (LASIX) 20 MG Tab Take 40 mg by mouth every day.      meloxicam (MOBIC) 7.5 MG Tab Take 1 Tablet by mouth 1 time a day as needed for Moderate Pain for up to 30 days. Take with food. 30 Tablet 0    TOUJEO SOLOSTAR 300 UNIT/ML Solution Pen-injector INJECT SUBCUTANEOUSLY 50 UNITS  DAILY 18  "mL 3    Insulin Lispro-aabc, 1 U Dial, (LYUMJEV KWIKPEN) 100 UNIT/ML Solution Pen-injector Inject 12 Units under the skin 3 times a day before meals. 90 day 45 mL 2    metformin (GLUCOPHAGE) 1000 MG tablet TAKE 1 TABLET BY MOUTH TWICE A DAY WITH FOOD 60 Tablet 8    amlodipine-valsartan (EXFORGE)  MG per tablet Take 1 Tablet by mouth every day. 100 Tablet 1    rosuvastatin (CRESTOR) 20 MG Tab Take 1 Tablet by mouth every day. 100 Tablet 1    Semaglutide, 1 MG/DOSE, (OZEMPIC, 1 MG/DOSE,) 4 MG/3ML Solution Pen-injector Inject 1 mg under the skin every 7 days. 9 mL 3    glimepiride (AMARYL) 4 MG Tab TAKE 1 TABLET BY MOUTH TWICE A  Tablet 4    B Complex Vitamins (VITAMIN B COMPLEX PO) Take 1 Tablet by mouth every morning. Once daily      VITAMIN D PO Take 1 Tablet by mouth every morning. Once dialy      Multiple Vitamin (MULTIVITAMIN ADULT PO) Take 1 Tablet by mouth every morning.      aspirin 81 MG tablet Take 81 mg by mouth every day.      [DISCONTINUED] amoxicillin (AMOXIL) 500 MG Cap Take 500 mg by mouth. (Patient not taking: Reported on 2/7/2025)      [DISCONTINUED] cephALEXin (KEFLEX) 500 MG Cap  (Patient not taking: Reported on 2/7/2025)      [DISCONTINUED] ketorolac (TORADOL) 10 MG Tab Take 1 tablet every 8 hours by oral route as needed for 3 days, for pain. (Patient not taking: Reported on 2/7/2025)      Continuous Blood Gluc Sensor (FREESTYLE ETELVINA 2 SENSOR) Misc USE 1 EACH EVERY 14 DAYS. 6 Each 3     No facility-administered encounter medications on file as of 2/7/2025.     Review of Systems   All other systems reviewed and are negative.       Objective:   /66 (BP Location: Left arm, Patient Position: Sitting)   Pulse 82   Resp 16   Ht 1.6 m (5' 3\")   Wt 97.1 kg (214 lb)   LMP 01/01/2009   SpO2 96%   BMI 37.91 kg/m²     Physical Exam  Vitals reviewed.   Constitutional:       General: She is not in acute distress.     Appearance: She is well-developed. She is not diaphoretic.   HENT: "      Head: Normocephalic and atraumatic.      Right Ear: External ear normal.      Left Ear: External ear normal.      Mouth/Throat:      Pharynx: No oropharyngeal exudate.   Eyes:      General: No scleral icterus.        Right eye: No discharge.         Left eye: No discharge.      Conjunctiva/sclera: Conjunctivae normal.      Pupils: Pupils are equal, round, and reactive to light.   Neck:      Thyroid: No thyromegaly.      Vascular: No JVD.      Trachea: No tracheal deviation.   Cardiovascular:      Rate and Rhythm: Normal rate and regular rhythm.      Chest Wall: PMI is not displaced.      Pulses:           Carotid pulses are 2+ on the right side and 2+ on the left side.       Radial pulses are 2+ on the right side and 2+ on the left side.        Popliteal pulses are 2+ on the right side and 2+ on the left side.        Dorsalis pedis pulses are 2+ on the right side and 2+ on the left side.        Posterior tibial pulses are 2+ on the right side and 2+ on the left side.      Heart sounds: Normal heart sounds, S1 normal and S2 normal. No murmur heard.     No friction rub. No gallop. No S3 or S4 sounds.   Pulmonary:      Effort: Pulmonary effort is normal. No respiratory distress.      Breath sounds: Normal breath sounds. No wheezing or rales.   Chest:      Chest wall: No tenderness.   Abdominal:      General: Bowel sounds are normal. There is no distension.      Palpations: Abdomen is soft.      Tenderness: There is no abdominal tenderness.   Musculoskeletal:         General: No swelling or tenderness. Normal range of motion.      Cervical back: Normal range of motion and neck supple.   Skin:     General: Skin is warm and dry.      Findings: No erythema or rash.   Neurological:      Mental Status: She is alert and oriented to person, place, and time.      Cranial Nerves: No cranial nerve deficit (Cranial nerves II through XII grossly intact).   Psychiatric:         Behavior: Behavior normal.         Thought  "Content: Thought content normal.         Judgment: Judgment normal.       LABS:  Lab Results   Component Value Date/Time    CHOLSTRLTOT 146 03/15/2024 06:25 AM    LDL 63 03/15/2024 06:25 AM    HDL 60 03/15/2024 06:25 AM    TRIGLYCERIDE 114 03/15/2024 06:25 AM       Lab Results   Component Value Date/Time    WBC 11.9 (H) 12/05/2024 01:29 AM    WBC 7.0 07/20/2010 08:50 AM    RBC 4.75 12/05/2024 01:29 AM    RBC 4.96 07/20/2010 08:50 AM    HEMOGLOBIN 12.8 12/05/2024 01:29 AM    HEMATOCRIT 40.2 12/05/2024 01:29 AM    MCV 84.6 12/05/2024 01:29 AM    MCV 90 07/20/2010 08:50 AM    NEUTSPOLYS 83.40 (H) 12/04/2024 06:22 AM    LYMPHOCYTES 10.60 (L) 12/04/2024 06:22 AM    MONOCYTES 4.20 12/04/2024 06:22 AM    EOSINOPHILS 1.00 12/04/2024 06:22 AM    BASOPHILS 0.40 12/04/2024 06:22 AM    HYPOCHROMIA 1+ 03/27/2013 12:18 PM     Lab Results   Component Value Date/Time    SODIUM 141 12/05/2024 01:29 AM    POTASSIUM 3.9 12/05/2024 01:29 AM    CHLORIDE 105 12/05/2024 01:29 AM    CO2 22 12/05/2024 01:29 AM    GLUCOSE 265 (H) 12/05/2024 01:29 AM    BUN 16 12/05/2024 01:29 AM    CREATININE 0.73 12/05/2024 01:29 AM    CREATININE 0.77 07/20/2010 08:50 AM    BUNCREATRAT 18 07/20/2010 08:50 AM    GLOMRATE >59 07/20/2010 08:50 AM     Lab Results   Component Value Date    HBA1C 8.1 (A) 11/27/2024      Lab Results   Component Value Date/Time    ALKPHOSPHAT 138 (H) 12/04/2024 06:22 AM    ASTSGOT 20 12/04/2024 06:22 AM    ALTSGPT 21 12/04/2024 06:22 AM    TBILIRUBIN 0.7 12/04/2024 06:22 AM      No results found for: \"BNPBTYPENAT\"   No results found for: \"TSH\"  No results found for: \"PROTHROMBTM\", \"INR\"     EKG (6/28/2021):  I have personally reviewed the EKG this visit and discussed with the patient.  Sinus rhythm 81 bpm.  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        Assessment:     1. Localized edema  EC-ECHOCARDIOGRAM COMPLETE W/O CONT      2. Essential hypertension        3. LVH (left ventricular hypertrophy)        4. JASPER on CPAP        5. Type " 2 diabetes mellitus with hyperglycemia, with long-term current use of insulin (HCC)            Medical Decision Making:  Today's Assessment / Status / Plan:     Doing well no established CAD but new progression in LE edema. Lasix increase trial initiated by her PCP appropriately but she is just starting. Will check echo. If no CV etiology then recommend pursuing other etiologies (venous, oncotic, lymphatic, etc). Compression garments recommended.    FU

## 2025-02-12 DIAGNOSIS — Z79.4 TYPE 2 DIABETES MELLITUS WITH HYPERGLYCEMIA, WITH LONG-TERM CURRENT USE OF INSULIN (HCC): ICD-10-CM

## 2025-02-12 DIAGNOSIS — E11.65 TYPE 2 DIABETES MELLITUS WITH HYPERGLYCEMIA, WITH LONG-TERM CURRENT USE OF INSULIN (HCC): ICD-10-CM

## 2025-02-12 RX ORDER — INSULIN LISPRO-AABC 100 [IU]/ML
INJECTION, SOLUTION SUBCUTANEOUS
Qty: 15 ML | Refills: 1 | Status: SHIPPED | OUTPATIENT
Start: 2025-02-12

## 2025-03-11 DIAGNOSIS — E11.65 TYPE 2 DIABETES MELLITUS WITH HYPERGLYCEMIA, WITH LONG-TERM CURRENT USE OF INSULIN (HCC): ICD-10-CM

## 2025-03-11 DIAGNOSIS — Z79.4 TYPE 2 DIABETES MELLITUS WITH HYPERGLYCEMIA, WITH LONG-TERM CURRENT USE OF INSULIN (HCC): ICD-10-CM

## 2025-03-11 RX ORDER — GLIMEPIRIDE 4 MG/1
4 TABLET ORAL 2 TIMES DAILY
Qty: 60 TABLET | Refills: 14 | Status: SHIPPED | OUTPATIENT
Start: 2025-03-11

## 2025-03-31 DIAGNOSIS — E11.65 TYPE 2 DIABETES MELLITUS WITH HYPERGLYCEMIA, WITH LONG-TERM CURRENT USE OF INSULIN (HCC): ICD-10-CM

## 2025-03-31 DIAGNOSIS — Z79.4 TYPE 2 DIABETES MELLITUS WITH HYPERGLYCEMIA, WITH LONG-TERM CURRENT USE OF INSULIN (HCC): ICD-10-CM

## 2025-04-15 DIAGNOSIS — Z79.4 TYPE 2 DIABETES MELLITUS WITH HYPERGLYCEMIA, WITH LONG-TERM CURRENT USE OF INSULIN (HCC): ICD-10-CM

## 2025-04-15 DIAGNOSIS — E11.65 TYPE 2 DIABETES MELLITUS WITH HYPERGLYCEMIA, WITH LONG-TERM CURRENT USE OF INSULIN (HCC): ICD-10-CM

## 2025-04-15 RX ORDER — BLOOD-GLUCOSE METER
1 KIT MISCELLANEOUS 3 TIMES DAILY
Qty: 100 STRIP | Refills: 11 | Status: SHIPPED | OUTPATIENT
Start: 2025-04-15

## 2025-04-15 RX ORDER — MELOXICAM 15 MG/1
15 TABLET ORAL DAILY
Qty: 100 TABLET | Refills: 3 | Status: SHIPPED | OUTPATIENT
Start: 2025-04-15

## 2025-05-01 ENCOUNTER — HOSPITAL ENCOUNTER (OUTPATIENT)
Dept: RADIOLOGY | Facility: MEDICAL CENTER | Age: 70
End: 2025-05-01
Attending: PHYSICIAN ASSISTANT
Payer: MEDICARE

## 2025-05-01 DIAGNOSIS — N20.0 CALCULUS OF KIDNEY: ICD-10-CM

## 2025-05-01 PROCEDURE — 74018 RADEX ABDOMEN 1 VIEW: CPT

## 2025-05-12 ENCOUNTER — HOSPITAL ENCOUNTER (OUTPATIENT)
Dept: CARDIOLOGY | Facility: MEDICAL CENTER | Age: 70
End: 2025-05-12
Attending: INTERNAL MEDICINE
Payer: MEDICARE

## 2025-05-12 DIAGNOSIS — R60.0 LOCALIZED EDEMA: ICD-10-CM

## 2025-05-12 PROCEDURE — 93306 TTE W/DOPPLER COMPLETE: CPT

## 2025-05-20 LAB
LV EJECT FRACT MOD 2C 99903: 64.34
LV EJECT FRACT MOD 4C 99902: 62.87
LV EJECT FRACT MOD BP 99901: 63.96

## 2025-05-20 PROCEDURE — 93306 TTE W/DOPPLER COMPLETE: CPT | Mod: 26 | Performed by: INTERNAL MEDICINE

## 2025-05-21 ENCOUNTER — RESULTS FOLLOW-UP (OUTPATIENT)
Dept: CARDIOLOGY | Facility: MEDICAL CENTER | Age: 70
End: 2025-05-21
Payer: MEDICARE

## 2025-05-22 ENCOUNTER — HOSPITAL ENCOUNTER (OUTPATIENT)
Dept: LAB | Facility: MEDICAL CENTER | Age: 70
End: 2025-05-22
Attending: INTERNAL MEDICINE
Payer: MEDICARE

## 2025-05-22 ENCOUNTER — HOSPITAL ENCOUNTER (OUTPATIENT)
Dept: LAB | Facility: MEDICAL CENTER | Age: 70
End: 2025-05-22
Attending: STUDENT IN AN ORGANIZED HEALTH CARE EDUCATION/TRAINING PROGRAM
Payer: MEDICARE

## 2025-05-22 DIAGNOSIS — Z79.4 TYPE 2 DIABETES MELLITUS WITH HYPERGLYCEMIA, WITH LONG-TERM CURRENT USE OF INSULIN (HCC): ICD-10-CM

## 2025-05-22 DIAGNOSIS — R76.8 SEROLOGIC AUTOIMMUNITY: ICD-10-CM

## 2025-05-22 DIAGNOSIS — E11.65 TYPE 2 DIABETES MELLITUS WITH HYPERGLYCEMIA, WITH LONG-TERM CURRENT USE OF INSULIN (HCC): ICD-10-CM

## 2025-05-22 LAB
25(OH)D3 SERPL-MCNC: 42 NG/ML (ref 30–100)
ALBUMIN SERPL BCP-MCNC: 4.2 G/DL (ref 3.2–4.9)
ALBUMIN/GLOB SERPL: 1.4 G/DL
ALP SERPL-CCNC: 133 U/L (ref 30–99)
ALT SERPL-CCNC: 27 U/L (ref 2–50)
ANION GAP SERPL CALC-SCNC: 13 MMOL/L (ref 7–16)
AST SERPL-CCNC: 23 U/L (ref 12–45)
BILIRUB SERPL-MCNC: 0.4 MG/DL (ref 0.1–1.5)
BUN SERPL-MCNC: 13 MG/DL (ref 8–22)
CALCIUM ALBUM COR SERPL-MCNC: 9.2 MG/DL (ref 8.5–10.5)
CALCIUM SERPL-MCNC: 9.4 MG/DL (ref 8.5–10.5)
CHLORIDE SERPL-SCNC: 106 MMOL/L (ref 96–112)
CHOLEST SERPL-MCNC: 137 MG/DL (ref 100–199)
CO2 SERPL-SCNC: 24 MMOL/L (ref 20–33)
CREAT SERPL-MCNC: 0.88 MG/DL (ref 0.5–1.4)
CREAT UR-MCNC: 38.8 MG/DL
FASTING STATUS PATIENT QL REPORTED: NORMAL
GFR SERPLBLD CREATININE-BSD FMLA CKD-EPI: 71 ML/MIN/1.73 M 2
GLOBULIN SER CALC-MCNC: 2.9 G/DL (ref 1.9–3.5)
GLUCOSE SERPL-MCNC: 141 MG/DL (ref 65–99)
HBV SURFACE AG SER QL: NORMAL
HCV AB SER QL: NORMAL
HDLC SERPL-MCNC: 60 MG/DL
LDLC SERPL CALC-MCNC: 52 MG/DL
MICROALBUMIN UR-MCNC: 66.2 MG/DL
MICROALBUMIN/CREAT UR: 1706 MG/G (ref 0–30)
POTASSIUM SERPL-SCNC: 3.9 MMOL/L (ref 3.6–5.5)
PROT SERPL-MCNC: 7.1 G/DL (ref 6–8.2)
SODIUM SERPL-SCNC: 143 MMOL/L (ref 135–145)
TRIGL SERPL-MCNC: 124 MG/DL (ref 0–149)

## 2025-05-22 PROCEDURE — 82043 UR ALBUMIN QUANTITATIVE: CPT

## 2025-05-22 PROCEDURE — 80061 LIPID PANEL: CPT

## 2025-05-22 PROCEDURE — 80053 COMPREHEN METABOLIC PANEL: CPT

## 2025-05-22 PROCEDURE — 86803 HEPATITIS C AB TEST: CPT

## 2025-05-22 PROCEDURE — 86480 TB TEST CELL IMMUN MEASURE: CPT

## 2025-05-22 PROCEDURE — 36415 COLL VENOUS BLD VENIPUNCTURE: CPT

## 2025-05-22 PROCEDURE — 82570 ASSAY OF URINE CREATININE: CPT

## 2025-05-22 PROCEDURE — 82306 VITAMIN D 25 HYDROXY: CPT

## 2025-05-22 PROCEDURE — 87340 HEPATITIS B SURFACE AG IA: CPT

## 2025-05-23 LAB
GAMMA INTERFERON BACKGROUND BLD IA-ACNC: 0.03 IU/ML
M TB IFN-G BLD-IMP: NEGATIVE
M TB IFN-G CD4+ BCKGRND COR BLD-ACNC: 0 IU/ML
MITOGEN IGNF BCKGRD COR BLD-ACNC: >10 IU/ML
QFT TB2 - NIL TBQ2: -0.01 IU/ML

## 2025-05-28 ENCOUNTER — NON-PROVIDER VISIT (OUTPATIENT)
Dept: ENDOCRINOLOGY | Facility: MEDICAL CENTER | Age: 70
End: 2025-05-28
Attending: INTERNAL MEDICINE
Payer: MEDICARE

## 2025-05-28 VITALS
SYSTOLIC BLOOD PRESSURE: 143 MMHG | BODY MASS INDEX: 38.96 KG/M2 | OXYGEN SATURATION: 96 % | WEIGHT: 211.7 LBS | HEART RATE: 79 BPM | DIASTOLIC BLOOD PRESSURE: 71 MMHG | HEIGHT: 62 IN

## 2025-05-28 DIAGNOSIS — E11.65 TYPE 2 DIABETES MELLITUS WITH HYPERGLYCEMIA, WITH LONG-TERM CURRENT USE OF INSULIN (HCC): Primary | ICD-10-CM

## 2025-05-28 DIAGNOSIS — Z79.4 TYPE 2 DIABETES MELLITUS WITH HYPERGLYCEMIA, WITH LONG-TERM CURRENT USE OF INSULIN (HCC): Primary | ICD-10-CM

## 2025-05-28 LAB
HBA1C MFR BLD: 8.7 % (ref ?–5.8)
POCT INT CON NEG: NEGATIVE
POCT INT CON POS: POSITIVE

## 2025-05-28 PROCEDURE — 95250 CONT GLUC MNTR PHYS/QHP EQP: CPT | Performed by: INTERNAL MEDICINE

## 2025-05-28 PROCEDURE — 99213 OFFICE O/P EST LOW 20 MIN: CPT | Performed by: INTERNAL MEDICINE

## 2025-05-28 PROCEDURE — 83036 HEMOGLOBIN GLYCOSYLATED A1C: CPT

## 2025-05-28 ASSESSMENT — FIBROSIS 4 INDEX: FIB4 SCORE: 0.99

## 2025-05-28 NOTE — PROGRESS NOTES
"Endocrinology Clinic Progress Note  PCP: Link Polanco III, M.D.    HPI:  Zena Hui is a 69 y.o. old patient who is seen today by the Diabetes Nurse Specialist for review of  her uncontrolled type 2 diabetes with long term use of insulin.   Recent changes in health:  states she has had issues with her sacroiliac joint since December.  States blood sugars have been running higher.   DM:   Last A1c:   Lab Results   Component Value Date/Time    HBA1C 8.7 (A) 05/28/2025 08:18 AM      Previous A1c was 8.1 on 11/27/24  A1C GOAL: < 7    Diabetes Medications:   Toujeo 40 units per day  Lyumjev  taking 8 units with meals (often takes after meals)  Glimepiride 4 mg bid  Metformin 1000 mg bid  Ozempic 1 mg weekly      Exercise: walking around [place] (2 miles) several times a week and going to gym for 30 minutes 3 times a week.  Diet: \"healthy\" diet  in general  Tends to eat a lot of chocolate  Patient's body mass index is 38.72 kg/m². Exercise and nutrition counseling were performed at this visit.    Glucose monitoring frequency:  using Shots        The patient's Continuous Glucose Monitor was downloaded and reviewed in great detail with the patient.  You can find report scanned into media    Hypoglycemic episodes: no  Last Retinal Exam: states completed at [facility] in [place], she will bring in results    Foot Exam:  Monofilament: current      Plan:     Discussed and educated on:   - All medications, side effects and compliance (discussed carefully)  - Annual eye examinations at Ophthalmology  - HbA1C: target  - Home glucose monitoring emphasized  - Weight control and daily exercise  - labs reviewed  Recommended medication changes: increase Toujeo to 44 units and take Lyumjev BEFORE she eats.     "

## 2025-05-31 DIAGNOSIS — I10 ESSENTIAL HYPERTENSION: ICD-10-CM

## 2025-06-02 RX ORDER — AMLODIPINE AND VALSARTAN 10; 320 MG/1; MG/1
1 TABLET ORAL DAILY
Qty: 100 TABLET | Refills: 2 | Status: SHIPPED | OUTPATIENT
Start: 2025-06-02

## 2025-06-09 ENCOUNTER — HOSPITAL ENCOUNTER (OUTPATIENT)
Dept: RADIOLOGY | Facility: MEDICAL CENTER | Age: 70
End: 2025-06-09
Attending: FAMILY MEDICINE
Payer: MEDICARE

## 2025-06-09 DIAGNOSIS — Z12.31 VISIT FOR SCREENING MAMMOGRAM: ICD-10-CM

## 2025-06-09 PROCEDURE — 77063 BREAST TOMOSYNTHESIS BI: CPT

## 2025-06-10 ENCOUNTER — RESULTS FOLLOW-UP (OUTPATIENT)
Dept: MEDICAL GROUP | Facility: PHYSICIAN GROUP | Age: 70
End: 2025-06-10

## 2025-07-17 DIAGNOSIS — E11.65 TYPE 2 DIABETES MELLITUS WITH HYPERGLYCEMIA, WITH LONG-TERM CURRENT USE OF INSULIN (HCC): ICD-10-CM

## 2025-07-17 DIAGNOSIS — Z79.4 TYPE 2 DIABETES MELLITUS WITH HYPERGLYCEMIA, WITH LONG-TERM CURRENT USE OF INSULIN (HCC): ICD-10-CM

## 2025-07-18 ENCOUNTER — APPOINTMENT (OUTPATIENT)
Dept: MEDICAL GROUP | Facility: PHYSICIAN GROUP | Age: 70
End: 2025-07-18
Payer: MEDICARE

## 2025-07-18 VITALS
WEIGHT: 209 LBS | SYSTOLIC BLOOD PRESSURE: 128 MMHG | RESPIRATION RATE: 16 BRPM | TEMPERATURE: 97.8 F | OXYGEN SATURATION: 97 % | HEART RATE: 80 BPM | DIASTOLIC BLOOD PRESSURE: 72 MMHG | HEIGHT: 63 IN | BODY MASS INDEX: 37.03 KG/M2

## 2025-07-18 DIAGNOSIS — E11.65 TYPE 2 DIABETES MELLITUS WITH HYPERGLYCEMIA, WITH LONG-TERM CURRENT USE OF INSULIN (HCC): ICD-10-CM

## 2025-07-18 DIAGNOSIS — F40.243 ANXIETY WITH FLYING: Primary | ICD-10-CM

## 2025-07-18 DIAGNOSIS — R30.0 DYSURIA: ICD-10-CM

## 2025-07-18 DIAGNOSIS — Z79.4 TYPE 2 DIABETES MELLITUS WITH HYPERGLYCEMIA, WITH LONG-TERM CURRENT USE OF INSULIN (HCC): ICD-10-CM

## 2025-07-18 PROCEDURE — 3074F SYST BP LT 130 MM HG: CPT | Performed by: FAMILY MEDICINE

## 2025-07-18 PROCEDURE — 99213 OFFICE O/P EST LOW 20 MIN: CPT | Performed by: FAMILY MEDICINE

## 2025-07-18 PROCEDURE — 3078F DIAST BP <80 MM HG: CPT | Performed by: FAMILY MEDICINE

## 2025-07-18 RX ORDER — ALPRAZOLAM 0.5 MG
0.5 TABLET ORAL 3 TIMES DAILY PRN
Qty: 10 TABLET | Refills: 0 | Status: SHIPPED | OUTPATIENT
Start: 2025-07-18 | End: 2025-07-28

## 2025-07-18 ASSESSMENT — FIBROSIS 4 INDEX: FIB4 SCORE: 1.005989105406166105

## 2025-07-18 NOTE — PROGRESS NOTES
"Subjective:     CC: Here for couple of issues.    HPI:   Zena presents today with the following medical concerns:    Anxiety with flying  This is a chronic problem.  Patient is having a flight coming up to [place] where she will be visiting 9 different countries.  She is asking for refill on her alprazolam.  She does use it very sparingly.  She states she be taken flights to all the different countries and is asking only for 10 pills to use during that time.    Dysuria  This is a new problem.  She is having some mild dysuria but is not sure if it might be a yeast infection.  Denies any vaginal discharge.    Past Medical History[1]    Social History[2]    Current Medications and Prescriptions Ordered in Epic[3]    Allergies:  Patient has no known allergies.    Health Maintenance: Completed    ROS:  Gen: no fevers/chills, no changes in weight  Eyes: no changes in vision  ENT: no sore throat, no hearing loss, no bloody nose  Pulm: no sob, no cough  CV: no chest pain, no palpitations  GI: no nausea/vomiting, no diarrhea  MSk: no myalgias  Skin: no rash  Neuro: no headaches, no numbness/tingling  Heme/Lymph: no easy bruising      Objective:       Exam:  /72 (BP Location: Right arm, Patient Position: Sitting, BP Cuff Size: Adult)   Pulse 80   Temp 36.6 °C (97.8 °F) (Temporal)   Resp 16   Ht 1.6 m (5' 3\")   Wt 94.8 kg (209 lb)   LMP 01/01/2009   SpO2 97%   BMI 37.02 kg/m²  Body mass index is 37.02 kg/m².    Gen: Alert and oriented, No apparent distress.  Lungs: Normal effort, .  Ext: No clubbing, cyanosis, edema.  Psych: Patient is alert and cooperative.  No unusual toppers expressed.  Insight and judgment is good.  Does not appear to be overtly anxious or depressed on today's visit.    Labs: Ordered    Assessment & Plan:     70 y.o. female with the following -     1. Anxiety with flying (Primary)  This is a chronic issue.  Medication renewed.  Since it is for such a short amount of time we will not do a " consent form at this time.  Patient was also provided with a printout of her current medications in case she needs those for customs but I told her it is unlikely.  - ALPRAZolam (XANAX) 0.5 MG Tab; Take 1 Tablet by mouth 3 times a day as needed for Anxiety (flight anxiety) for up to 10 days.  Dispense: 10 Tablet; Refill: 0    2. Dysuria  This is a new problem.  Urinalysis ordered.  Should be notified of the results.  - URINALYSIS,CULTURE IF INDICATED; Future      Return if symptoms worsen or fail to improve.    Please note that this dictation was created using voice recognition software. I have made every reasonable attempt to correct obvious errors, but I expect that there are errors of grammar and possibly content that I did not discover before finalizing the note.             [1]   Past Medical History:  Diagnosis Date    Arthritis 2021    fingers    Blood clotting disorder (HCC)     in spinal cord    Bronchitis     Essential hypertension, benign     High cholesterol     Hyperlipidemia     Mixed hyperlipidemia     Organic sleep apnea, unspecified     Osteopenia     Renal disorder 04/2019    kidney stones    Sleep apnea     Type 2 diabetes mellitus with microalbuminuria, with long-term current use of insulin ([facility]) 02/28/2023    Type II or unspecified type diabetes mellitus without mention of complication, not stated as uncontrolled     oral meds   [2]   Social History  Tobacco Use    Smoking status: Never    Smokeless tobacco: Never   Vaping Use    Vaping status: Never Used   Substance Use Topics    Alcohol use: Never    Drug use: Never   [3]   Current Outpatient Medications Ordered in Epic   Medication Sig Dispense Refill    ALPRAZolam (XANAX) 0.5 MG Tab Take 1 Tablet by mouth 3 times a day as needed for Anxiety (flight anxiety) for up to 10 days. 10 Tablet 0    amlodipine-valsartan (EXFORGE)  MG per tablet TAKE 1 TABLET BY MOUTH EVERY  Tablet 2    meloxicam (MOBIC) 15 MG tablet Take 1 Tablet by  mouth every day. (Patient taking differently: Take 15 mg by mouth 1 time a day as needed for Moderate Pain.) 100 Tablet 3    glucose blood (FREESTYLE LITE) strip 1 Strip by Other route in the morning, at noon, and at bedtime. Freestyle lite - should match her meter 100 Strip 11    Continuous Glucose Sensor (FREESTYLE ETELVINA 2 SENSOR) Misc USE 1 EACH EVERY 14 DAYS. 6 Each 3    glimepiride (AMARYL) 4 MG Tab TAKE 1 TABLET BY MOUTH TWICE A DAY 60 Tablet 14    LYUMJEV KWIKPEN 100 UNIT/ML Solution Pen-injector INJECT SUBCUTANEOUSLY 4 UNITS 3  TIMES DAILY BEFORE MEALS 15 mL 1    furosemide (LASIX) 20 MG Tab Take 40 mg by mouth every day. (Patient taking differently: Take 20 mg by mouth 1 time a day as needed.)      TOUJEO SOLOSTAR 300 UNIT/ML Solution Pen-injector INJECT SUBCUTANEOUSLY 50 UNITS  DAILY 18 mL 3    metformin (GLUCOPHAGE) 1000 MG tablet TAKE 1 TABLET BY MOUTH TWICE A DAY WITH FOOD 60 Tablet 8    rosuvastatin (CRESTOR) 20 MG Tab Take 1 Tablet by mouth every day. 100 Tablet 1    Semaglutide, 1 MG/DOSE, (OZEMPIC, 1 MG/DOSE,) 4 MG/3ML Solution Pen-injector Inject 1 mg under the skin every 7 days. 9 mL 3    B Complex Vitamins (VITAMIN B COMPLEX PO) Take 1 Tablet by mouth every morning. Once daily      VITAMIN D PO Take 1 Tablet by mouth every morning. Once dialy      Multiple Vitamin (MULTIVITAMIN ADULT PO) Take 1 Tablet by mouth every morning.      aspirin 81 MG tablet Take 81 mg by mouth every day.       No current [facility]-ordered facility-administered medications on file.

## 2025-07-18 NOTE — LETTER
[facility]  Link Polanco III, M.D.  [address]  [place] [address]  Fax: [phone]   Authorization for Release/Disclosure of   Protected Health Information   Name: JUVENAL HUI : 1955 SSN: [id number]   Address: [address] [place] [address] Phone:    There are no phone numbers on file.   I authorize the entity listed below to release/disclose the PHI below to:   [facility]/Link Polanco III, M.D. and Link Polanco III, M.D.   Provider or Entity Name:     Address   City, State, Zip   Phone:      Fax:     Reason for request: continuity of care   Information to be released:    [  ] LAST COLONOSCOPY,  including any PATH REPORT and follow-up  [  ] LAST FIT/COLOGUARD RESULT [  ] LAST DEXA  [  ] LAST MAMMOGRAM  [  ] LAST PAP  [  ] LAST LABS [  ] RETINA EXAM REPORT  [  ] IMMUNIZATION RECORDS  [  ] Release all info      [  ] Check here and initial the line next to each item to release ALL health information INCLUDING  _____ Care and treatment for drug and / or alcohol abuse  _____ HIV testing, infection status, or AIDS  _____ Genetic Testing    DATES OF SERVICE OR TIME PERIOD TO BE DISCLOSED: _____________  I understand and acknowledge that:  * This Authorization may be revoked at any time by you in writing, except if your health information has already been used or disclosed.  * Your health information that will be used or disclosed as a result of you signing this authorization could be re-disclosed by the recipient. If this occurs, your re-disclosed health information may no longer be protected by State or Federal laws.  * You may refuse to sign this Authorization. Your refusal will not affect your ability to obtain treatment.  * This Authorization becomes effective upon signing and will  on (date) __________.      If no date is indicated, this Authorization will  one (1) year from the signature date.    Name: Juvenal Hui  Signature: Date:   2025     PLEASE  FAX REQUESTED RECORDS BACK TO: [phone]

## 2025-07-18 NOTE — ASSESSMENT & PLAN NOTE
This is a chronic problem.  Patient is having a flight coming up to [place] where she will be visiting 9 different countries.  She is asking for refill on her alprazolam.  She does use it very sparingly.  She states she be taken flights to all the different countries and is asking only for 10 pills to use during that time.

## 2025-07-18 NOTE — TELEPHONE ENCOUNTER
Received request via: Pharmacy    Was the patient seen in the last year in this department? No    Does the patient have an active prescription (recently filled or refills available) for medication(s) requested? No    Pharmacy Name: formerly [facility] - [address] [[id number]]     Does the patient have senior living Plus and need 100-day supply? (This applies to ALL medications) Yes, quantity updated to 100 days     Patient has not been seen in a year but does have a visit with Dr. Timmons scheduled for 11/12/25 but will not have prescriptions for Ozempic or Lyumjev by that time and was wondering if we could send any in due to her also having a trip and not wanting to be out of medication

## 2025-07-18 NOTE — ASSESSMENT & PLAN NOTE
This is a new problem.  She is having some mild dysuria but is not sure if it might be a yeast infection.  Denies any vaginal discharge.

## 2025-07-18 NOTE — LETTER
Current Outpatient Medications:     amlodipine-valsartan, 1 Tablet, Oral, DAILY, Taking    meloxicam, 15 mg, Oral, DAILY (Patient taking differently: 15 mg, Oral, 1 TIME DAILY PRN, Moderate Pain), Taking Differently    FREESTYLE LITE, 1 Each, Other, TID, Taking    FreeStyle Christina 2 Sensor, USE 1 EACH EVERY 14 DAYS., Taking    glimepiride, 4 mg, Oral, BID, Taking    Lyumjev KwikPen, INJECT SUBCUTANEOUSLY 4 UNITS 3  TIMES DAILY BEFORE MEALS, Taking    furosemide, 40 mg, Oral, DAILY (Patient taking differently: 20 mg, Oral, 1 TIME DAILY PRN), Taking Differently    Toujeo SoloStar, INJECT SUBCUTANEOUSLY 50 UNITS  DAILY, Taking    metformin, 1,000 mg, Oral, BID WITH MEALS, Taking    rosuvastatin, 20 mg, Oral, DAILY, Taking    Ozempic (1 MG/DOSE), 1 mg, Subcutaneous, Q7 DAYS, Taking    B Complex Vitamins (VITAMIN B COMPLEX PO), 1 Tablet, Oral, QAM, Taking    VITAMIN D PO, 1 Tablet, Oral, QAM, Taking    Multiple Vitamin (MULTIVITAMIN ADULT PO), 1 Tablet, Oral, QAM, Taking    aspirin, 81 mg, Oral, DAILY, Taking

## 2025-07-18 NOTE — LETTER
Current Outpatient Medications:     ALPRAZolam, 0.5 mg, Oral, TID PRN, Taking As Needed    amlodipine-valsartan, 1 Tablet, Oral, DAILY, Taking    meloxicam, 15 mg, Oral, DAILY (Patient taking differently: 15 mg, Oral, 1 TIME DAILY PRN, Moderate Pain), Taking Differently    FREESTYLE LITE, 1 Each, Other, TID, Taking    FreeStyle Christina 2 Sensor, USE 1 EACH EVERY 14 DAYS., Taking    glimepiride, 4 mg, Oral, BID, Taking    Lyumjev KwikPen, INJECT SUBCUTANEOUSLY 4 UNITS 3  TIMES DAILY BEFORE MEALS, Taking    furosemide, 40 mg, Oral, DAILY (Patient taking differently: 20 mg, Oral, 1 TIME DAILY PRN), Taking Differently    Toujeo SoloStar, INJECT SUBCUTANEOUSLY 50 UNITS  DAILY, Taking    metformin, 1,000 mg, Oral, BID WITH MEALS, Taking    rosuvastatin, 20 mg, Oral, DAILY, Taking    Ozempic (1 MG/DOSE), 1 mg, Subcutaneous, Q7 DAYS, Taking    B Complex Vitamins (VITAMIN B COMPLEX PO), 1 Tablet, Oral, QAM, Taking    VITAMIN D PO, 1 Tablet, Oral, QAM, Taking    Multiple Vitamin (MULTIVITAMIN ADULT PO), 1 Tablet, Oral, QAM, Taking    aspirin, 81 mg, Oral, DAILY, Taking

## 2025-07-21 ENCOUNTER — HOSPITAL ENCOUNTER (OUTPATIENT)
Facility: MEDICAL CENTER | Age: 70
End: 2025-07-21
Attending: FAMILY MEDICINE
Payer: MEDICARE

## 2025-07-21 DIAGNOSIS — R30.0 DYSURIA: ICD-10-CM

## 2025-07-21 LAB
APPEARANCE UR: CLEAR
BACTERIA #/AREA URNS HPF: NORMAL /HPF
BILIRUB UR QL STRIP.AUTO: NEGATIVE
CASTS URNS QL MICRO: NORMAL /LPF (ref 0–2)
COLOR UR: YELLOW
EPITHELIAL CELLS 1715: NORMAL /HPF (ref 0–5)
GLUCOSE UR STRIP.AUTO-MCNC: 100 MG/DL
KETONES UR STRIP.AUTO-MCNC: NEGATIVE MG/DL
LEUKOCYTE ESTERASE UR QL STRIP.AUTO: NEGATIVE
MICRO URNS: ABNORMAL
MUCOUS THREADS URNS QL MICRO: PRESENT /HPF
NITRITE UR QL STRIP.AUTO: NEGATIVE
PH UR STRIP.AUTO: 6 [PH] (ref 5–8)
PROT UR QL STRIP: >=300 MG/DL
RBC # URNS HPF: NORMAL /HPF (ref 0–2)
RBC UR QL AUTO: ABNORMAL
SP GR UR STRIP.AUTO: 1.02
UROBILINOGEN UR STRIP.AUTO-MCNC: 0.2 EU/DL
WBC #/AREA URNS HPF: NORMAL /HPF

## 2025-07-21 PROCEDURE — 81001 URINALYSIS AUTO W/SCOPE: CPT

## 2025-07-21 RX ORDER — INSULIN LISPRO-AABC 100 [IU]/ML
8 INJECTION, SOLUTION SUBCUTANEOUS
Qty: 15 ML | Refills: 3 | Status: SHIPPED | OUTPATIENT
Start: 2025-07-21

## 2025-07-21 RX ORDER — SEMAGLUTIDE 1.34 MG/ML
1 INJECTION, SOLUTION SUBCUTANEOUS
Qty: 9 ML | Refills: 3 | Status: SHIPPED | OUTPATIENT
Start: 2025-07-21

## 2025-07-25 RX ORDER — INSULIN LISPRO-AABC 100 [IU]/ML
INJECTION, SOLUTION SUBCUTANEOUS
Qty: 15 ML | Refills: 1 | OUTPATIENT
Start: 2025-07-25

## 2025-07-25 RX ORDER — SEMAGLUTIDE 1.34 MG/ML
1 INJECTION, SOLUTION SUBCUTANEOUS
Qty: 9 ML | Refills: 3 | OUTPATIENT
Start: 2025-07-25

## 2025-07-26 DIAGNOSIS — E78.5 DYSLIPIDEMIA ASSOCIATED WITH TYPE 2 DIABETES MELLITUS (HCC): ICD-10-CM

## 2025-07-26 DIAGNOSIS — E11.69 DYSLIPIDEMIA ASSOCIATED WITH TYPE 2 DIABETES MELLITUS (HCC): ICD-10-CM

## 2025-07-29 RX ORDER — ROSUVASTATIN CALCIUM 20 MG/1
20 TABLET, COATED ORAL DAILY
Qty: 100 TABLET | Refills: 3 | Status: SHIPPED | OUTPATIENT
Start: 2025-07-29

## 2025-08-04 DIAGNOSIS — E11.65 TYPE 2 DIABETES MELLITUS WITH HYPERGLYCEMIA, WITH LONG-TERM CURRENT USE OF INSULIN (HCC): ICD-10-CM

## 2025-08-04 DIAGNOSIS — Z79.4 TYPE 2 DIABETES MELLITUS WITH HYPERGLYCEMIA, WITH LONG-TERM CURRENT USE OF INSULIN (HCC): ICD-10-CM

## 2025-08-06 ENCOUNTER — TELEPHONE (OUTPATIENT)
Dept: SLEEP MEDICINE | Facility: MEDICAL CENTER | Age: 70
End: 2025-08-06
Payer: MEDICARE

## 2025-08-07 DIAGNOSIS — E11.65 TYPE 2 DIABETES MELLITUS WITH HYPERGLYCEMIA, WITH LONG-TERM CURRENT USE OF INSULIN (HCC): ICD-10-CM

## 2025-08-07 DIAGNOSIS — Z79.4 TYPE 2 DIABETES MELLITUS WITH HYPERGLYCEMIA, WITH LONG-TERM CURRENT USE OF INSULIN (HCC): ICD-10-CM

## 2025-08-07 RX ORDER — SEMAGLUTIDE 1.34 MG/ML
1 INJECTION, SOLUTION SUBCUTANEOUS
Qty: 9 ML | Refills: 3 | Status: SHIPPED | OUTPATIENT
Start: 2025-08-07

## 2025-08-07 RX ORDER — INSULIN LISPRO-AABC 100 [IU]/ML
8 INJECTION, SOLUTION SUBCUTANEOUS
Qty: 45 ML | Refills: 3 | Status: SHIPPED | OUTPATIENT
Start: 2025-08-07

## 2025-08-11 ENCOUNTER — OFFICE VISIT (OUTPATIENT)
Dept: SLEEP MEDICINE | Facility: MEDICAL CENTER | Age: 70
End: 2025-08-11
Payer: MEDICARE

## 2025-08-11 VITALS
HEART RATE: 89 BPM | BODY MASS INDEX: 36.86 KG/M2 | WEIGHT: 208 LBS | DIASTOLIC BLOOD PRESSURE: 84 MMHG | SYSTOLIC BLOOD PRESSURE: 124 MMHG | RESPIRATION RATE: 16 BRPM | HEIGHT: 63 IN | OXYGEN SATURATION: 96 %

## 2025-08-11 DIAGNOSIS — G47.33 OSA ON CPAP: Primary | ICD-10-CM

## 2025-08-11 DIAGNOSIS — G47.34 NOCTURNAL HYPOXIA: ICD-10-CM

## 2025-08-11 PROCEDURE — 3079F DIAST BP 80-89 MM HG: CPT

## 2025-08-11 PROCEDURE — 3074F SYST BP LT 130 MM HG: CPT

## 2025-08-11 PROCEDURE — 99214 OFFICE O/P EST MOD 30 MIN: CPT

## 2025-08-11 ASSESSMENT — FIBROSIS 4 INDEX: FIB4 SCORE: 1.005989105406166105

## (undated) DEVICE — PROTECTOR ULNA NERVE - (36PR/CA)

## (undated) DEVICE — SCOPE DIGITAL URETEROSCOPE DISPOSABLE

## (undated) DEVICE — SHEATH NAVIGATOR 11/13 X 36 URETERAL ACCESS

## (undated) DEVICE — GLOVE BIOGEL SZ 6.5 SURGICAL PF LTX (50PR/BX 4BX/CA)

## (undated) DEVICE — COVER FOOT UNIVERSAL DISP. - (25EA/CA)

## (undated) DEVICE — CONTAINER SPECIMEN BAG OR - STERILE 4 OZ W/LID (100EA/CA)

## (undated) DEVICE — GLOVE BIOGEL ECLIPSE  PF LATEX SIZE 6.5 (50PR/BX)

## (undated) DEVICE — HEAD HOLDER JUNIOR/ADULT

## (undated) DEVICE — GOWN WARMING STANDARD FLEX - (30/CA)

## (undated) DEVICE — CONNECTOR HOSE NEPTUNE FOR CYSTO ROOM

## (undated) DEVICE — TUBING CLEARLINK DUO-VENT - C-FLO (48EA/CA)

## (undated) DEVICE — GLOVE BIOGEL PI INDICATOR SZ 8.0 SURGICAL PF LF -(50/BX 4BX/CA)

## (undated) DEVICE — GLOVE BIOGEL INDICATOR SZ 7SURGICAL PF LTX - (50/BX 4BX/CA)

## (undated) DEVICE — LASER TRAC TIP 200 MIRCON FOR 100 WATT LASER

## (undated) DEVICE — WATER IRRIGATION STERILE 1000ML (12EA/CA)

## (undated) DEVICE — SET LEADWIRE 5 LEAD BEDSIDE DISPOSABLE ECG (1SET OF 5/EA)

## (undated) DEVICE — COVER LIGHT HANDLE ALC PLUS DISP (18EA/BX)

## (undated) DEVICE — BASKET ZERO TIP

## (undated) DEVICE — SUCTION INSTRUMENT YANKAUER BULBOUS TIP W/O VENT (50EA/CA)

## (undated) DEVICE — SENSOR OXIMETER ADULT SPO2 RD SET (20EA/BX)

## (undated) DEVICE — CANISTER SUCTION 3000ML MECHANICAL FILTER AUTO SHUTOFF MEDI-VAC NONSTERILE LF DISP  (40EA/CA)

## (undated) DEVICE — SPONGE GAUZESTER 4 X 4 4PLY - (128PK/CA)

## (undated) DEVICE — GOWN SURGICAL X-LARGE ULTRA - FILM-REINFORCED (20/CA)

## (undated) DEVICE — BAG DRAINAGE LINGEMAN CYSTO FOR GE/OEC 2600/2800 TABLES (20EA/CA)

## (undated) DEVICE — WATER IRRIG. STER 3000 ML - (4/CA)

## (undated) DEVICE — SENSOR SPO2 NEO LNCS ADHESIVE (20/BX) SEE USER NOTES

## (undated) DEVICE — JELLY, KY 2 0Z STERILE

## (undated) DEVICE — LACTATED RINGERS INJ 1000 ML - (14EA/CA 60CA/PF)

## (undated) DEVICE — SYRINGE 30 ML LL (56/BX)

## (undated) DEVICE — GLOVE BIOGEL SZ 7 SURGICAL PF LTX - (50PR/BX 4BX/CA)

## (undated) DEVICE — CATHETER URET OPEN END 6FR (10EA/BX)

## (undated) DEVICE — GOWN SURGEONS X-LARGE - DISP. (30/CA)

## (undated) DEVICE — TUBE CONNECT SUCTION CLEAR 120 X 1/4" (50EA/CA)"

## (undated) DEVICE — TOWELS CLOTH SURGICAL - (4/PK 20PK/CA)

## (undated) DEVICE — NEPTUNE 4 PORT MANIFOLD - (20/PK)

## (undated) DEVICE — ELECTRODE 850 FOAM ADHESIVE - HYDROGEL RADIOTRNSPRNT (50/PK)

## (undated) DEVICE — SODIUM CHL. IRRIGATION 0.9% 3000ML (4EA/CA 65CA/PF)

## (undated) DEVICE — DETERGENT RENUZYME PLUS 10 OZ PACKET (50/BX)

## (undated) DEVICE — WIRE GUIDE SENSOR DUAL FLEX - 5/BX

## (undated) DEVICE — SET EXTENSION WITH 2 PORTS (48EA/CA) ***PART #2C8610 IS A SUBSTITUTE*****

## (undated) DEVICE — PACK CYSTO III (2EA/CA)

## (undated) DEVICE — KIT ANESTHESIA W/CIRCUIT & 3/LT BAG W/FILTER (20EA/CA)

## (undated) DEVICE — GLOVE BIOGEL SZ 7.5 SURGICAL PF LTX - (50PR/BX 4BX/CA)

## (undated) DEVICE — PACK SINGLE BASIN - (6/CA)

## (undated) DEVICE — GLOVE BIOGEL PI INDICATOR SZ 7.5 SURGICAL PF LF -(50/BX 4BX/CA)

## (undated) DEVICE — SLEEVE, VASO, THIGH, MED

## (undated) DEVICE — FIBER LASER MOSES 200 UM (1/EA)

## (undated) DEVICE — MASK ANESTHESIA ADULT  - (100/CA)

## (undated) DEVICE — GLOVE BIOGEL PI ORTHO SZ 8 PF LF (40PR/BX)

## (undated) DEVICE — WATER IRRIG. STER. 1500 ML - (9/CA)

## (undated) DEVICE — SET IRRIGATION CYSTOSCOPY Y-TYPE L81 IN (20EA/CA)

## (undated) DEVICE — BAG URODRAIN WITH TUBING - (20/CA)

## (undated) DEVICE — GLOVE SZ 7.5 BIOGEL PI MICRO - PF LF (50PR/BX)

## (undated) DEVICE — KIT ROOM DECONTAMINATION

## (undated) DEVICE — CATHETER URETHRAL OPEN END AXXCESS (10EA/BX)

## (undated) DEVICE — JELLY SURGILUBE STERILE TUBE 4.25 OZ (1/EA)

## (undated) DEVICE — PACK CYSTOSCOPY - (14/CA)

## (undated) DEVICE — GLOVE BIOGEL SZ 8 SURGICAL PF LTX - (50PR/BX 4BX/CA)

## (undated) DEVICE — BASKET ZERO 1.9FR X 120CM

## (undated) DEVICE — SYRINGE TOOMEY (50EA/CA)

## (undated) DEVICE — TOWEL STOP TIMEOUT SAFETY FLAG (40EA/CA)

## (undated) DEVICE — CANISTER SUCTION 3000ML MECHANICAL FILTER AUTO SHUTOFF MEDI-VAC NONSTERILE LF DISP (40EA/CA)

## (undated) DEVICE — GLOVE BIOGEL ECLIPSE PF LATEX SIZE 9.0

## (undated) DEVICE — PACK CYSTOSCOPY III - (8/CA)

## (undated) DEVICE — GLOVE BIOGEL SZ 6 PF LATEX - (50EA/BX 4BX/CA)

## (undated) DEVICE — SYRINGE DISP. 12 CC LL - (100/BX)

## (undated) DEVICE — CATHETER URET DUAL LUMEN